# Patient Record
Sex: MALE | Race: WHITE | NOT HISPANIC OR LATINO | Employment: UNEMPLOYED | URBAN - METROPOLITAN AREA
[De-identification: names, ages, dates, MRNs, and addresses within clinical notes are randomized per-mention and may not be internally consistent; named-entity substitution may affect disease eponyms.]

---

## 2018-08-02 ENCOUNTER — OFFICE VISIT (OUTPATIENT)
Dept: FAMILY MEDICINE | Facility: CLINIC | Age: 43
End: 2018-08-02
Payer: COMMERCIAL

## 2018-08-02 VITALS
HEART RATE: 94 BPM | RESPIRATION RATE: 16 BRPM | HEIGHT: 69 IN | SYSTOLIC BLOOD PRESSURE: 110 MMHG | WEIGHT: 164 LBS | TEMPERATURE: 98.7 F | DIASTOLIC BLOOD PRESSURE: 70 MMHG | OXYGEN SATURATION: 98 % | BODY MASS INDEX: 24.29 KG/M2

## 2018-08-02 DIAGNOSIS — Z86.19 HISTORY OF LYME DISEASE: ICD-10-CM

## 2018-08-02 DIAGNOSIS — Z00.00 ROUTINE GENERAL MEDICAL EXAMINATION AT A HEALTH CARE FACILITY: Primary | ICD-10-CM

## 2018-08-02 DIAGNOSIS — F10.21 ALCOHOL DEPENDENCE IN REMISSION (H): ICD-10-CM

## 2018-08-02 PROCEDURE — 86617 LYME DISEASE ANTIBODY: CPT | Mod: 90 | Performed by: NURSE PRACTITIONER

## 2018-08-02 PROCEDURE — 99000 SPECIMEN HANDLING OFFICE-LAB: CPT | Performed by: NURSE PRACTITIONER

## 2018-08-02 PROCEDURE — 99386 PREV VISIT NEW AGE 40-64: CPT | Performed by: NURSE PRACTITIONER

## 2018-08-02 PROCEDURE — 36415 COLL VENOUS BLD VENIPUNCTURE: CPT | Performed by: NURSE PRACTITIONER

## 2018-08-02 PROCEDURE — 80053 COMPREHEN METABOLIC PANEL: CPT | Performed by: NURSE PRACTITIONER

## 2018-08-02 NOTE — PROGRESS NOTES
"    SUBJECTIVE:   CC: Paulo Franco is an 42 year old male who presents for preventative health visit.     Healthy Habits:    Do you get at least three servings of calcium containing foods daily (dairy, green leafy vegetables, etc.)? yes    Amount of exercise or daily activities, outside of work: 7 day(s) per week    Problems taking medications regularly No    Medication side effects: No    Have you had an eye exam in the past two years? yes    Do you see a dentist twice per year? no    Do you have sleep apnea, excessive snoring or daytime drowsiness?no     Patient would like blood work to follow up on recent lyme disease diagnosis-Positive Lymes about the 2nd of July and were put on Doxycycline for 3 weeks finished this was told to recheck just to be safe     Staying at now [place] [facility], mariusz (helped open [place] and other places around [facility]) will stay here and establishing today     Was at a [place], [place] - between [place] and [place]  and had flu like symptoms and broke out in hives, picked many ticks, but must have missed some  No symptoms now - had some lab work there was normal   Lots of sicknesses others have had, sore muscles from biking  spacial disotrtion issues, feels like has had a drink or two not sure if related  Rx glasses for \"Blind as a bat\"  Just had eye exam  Good diet and exercises regularly   Woodworking hobby makes walking sticks    Abuse: Current or Past(Physical, Sexual or Emotional)- No  Do you feel safe in your environment - Yes    Social History   Substance Use Topics     Smoking status: Never Smoker     Smokeless tobacco: Never Used     Alcohol use No      Comment: sober 4 months       If you drink alcohol do you typically have >3 drinks per day or >7 drinks per week? No                      Last PSA: No results found for: PSA    Reviewed orders with patient. Reviewed health maintenance and updated orders accordingly - Yes  Labs reviewed " in EPIC  BP Readings from Last 3 Encounters:   08/02/18 110/70    Wt Readings from Last 3 Encounters:   08/02/18 164 lb (74.4 kg)                  Patient Active Problem List   Diagnosis     History of Lyme disease     Alcohol dependence in remission (H)     Past Surgical History:   Procedure Laterality Date     HAND SURGERY Left        Social History   Substance Use Topics     Smoking status: Never Smoker     Smokeless tobacco: Never Used     Alcohol use No      Comment: sober 4 months      Family History   Problem Relation Age of Onset     Polycystic Kidney Diease Mother      Substance Abuse Mother      Depression Mother      Heart Defect Father      Heart Defect Brother          No current outpatient prescriptions on file.     No Known Allergies    Reviewed and updated as needed this visit by clinical staff  Tobacco  Allergies  Meds  Med Hx  Surg Hx  Fam Hx  Soc Hx        Reviewed and updated as needed this visit by Provider        Past Medical History:   Diagnosis Date     Substance abuse     Done with treatment on 7/16/2018      Past Surgical History:   Procedure Laterality Date     HAND SURGERY Left        ROS:  CONSTITUTIONAL: NEGATIVE for fever, chills, change in weight  INTEGUMENTARY/SKIN: NEGATIVE for worrisome rashes, moles or lesions  EYES: see HPI   ENT: NEGATIVE for ear, mouth and throat problems  RESP: NEGATIVE for significant cough or SOB  CV: NEGATIVE for chest pain, palpitations or peripheral edema  GI: NEGATIVE for nausea, abdominal pain, heartburn, or change in bowel habits   male: negative for dysuria, hematuria, decreased urinary stream, erectile dysfunction, urethral discharge  MUSCULOSKELETAL: NEGATIVE for significant arthralgias or myalgia  NEURO: NEGATIVE for weakness, dizziness or paresthesias  ENDOCRINE: NEGATIVE for temperature intolerance, skin/hair changes  HEME/ALLERGY/IMMUNE: NEGATIVE for bleeding problems  PSYCHIATRIC: NEGATIVE for changes in mood or affect    OBJECTIVE:  "  /70  Pulse 94  Temp 98.7  F (37.1  C) (Oral)  Resp 16  Ht 5' 8.5\" (1.74 m)  Wt 164 lb (74.4 kg)  SpO2 98%  BMI 24.57 kg/m2  EXAM:  GENERAL: healthy, alert and no distress  EYES: Eyes grossly normal to inspection, wearing glasses, PERRL and conjunctivae and sclerae normal  HENT: ear canals and TM's normal, nose and mouth without ulcers or lesions  NECK: no adenopathy, no asymmetry, masses, or scars and thyroid normal to palpation  RESP: lungs clear to auscultation - no rales, rhonchi or wheezes  BREAST: normal without masses, tenderness or nipple discharge and no palpable axillary masses or adenopathy  CV: regular rate and rhythm, normal S1 S2, no S3 or S4, no murmur, click or rub, no peripheral edema and peripheral pulses strong  ABDOMEN: soft, nontender, no hepatosplenomegaly, no masses and bowel sounds normal  MS: no gross musculoskeletal defects noted, no edema  SKIN: no suspicious lesions or rashes  NEURO: Normal strength and tone, mentation intact and speech normal  PSYCH: mentation appears normal, affect normal/bright    Diagnostic Test Results:  No results found for this or any previous visit (from the past 24 hour(s)).    ASSESSMENT/PLAN:       ICD-10-CM    1. Routine general medical examination at a health care facility Z00.00    2. History of Lyme disease Z86.19 Lyme Conf IgG and IgM by Immunoblot   3. Alcohol dependence in remission (H) F10.21 Comprehensive metabolic panel       COUNSELING:  Reviewed preventive health counseling, as reflected in patient instructions       Regular exercise       Healthy diet/nutrition       Vision screening       Safe sex practices/STD prevention       HIV screeninx in teen years, 1x in adult years, and at intervals if high risk    BP Readings from Last 1 Encounters:   18 110/70     Estimated body mass index is 24.57 kg/(m^2) as calculated from the following:    Height as of this encounter: 5' 8.5\" (1.74 m).    Weight as of this encounter: 164 lb " (74.4 kg).           reports that he has never smoked. He has never used smokeless tobacco.      Counseling Resources:  ATP IV Guidelines  Pooled Cohorts Equation Calculator  FRAX Risk Assessment  ICSI Preventive Guidelines  Dietary Guidelines for Americans, 2010  USDA's MyPlate  ASA Prophylaxis  Lung CA Screening    CELESTINO Ramirez CNP  [facility]

## 2018-08-02 NOTE — MR AVS SNAPSHOT
After Visit Summary   8/2/2018    Paulo Franco    MRN: [id number]           Patient Information     Date Of Birth          1975        Visit Information        Provider Department      8/2/2018 1:00 PM Larissa Mendoza APRN [facility]        Today's Diagnoses     Routine general medical examination at a health care facility    -  1    History of Lyme disease        Alcohol dependence in remission (H)          Care Instructions      Preventive Health Recommendations  Male Ages 40 to 49    Yearly exam:             See your health care provider every year in order to  o   Review health changes.   o   Discuss preventive care.    o   Review your medicines if your doctor has prescribed any.    You should be tested each year for STDs (sexually transmitted diseases) if you re at risk.     Have a cholesterol test every 5 years.     Have a colonoscopy (test for colon cancer) if someone in your family has had colon cancer or polyps before age 50.     After age 45, have a diabetes test (fasting glucose). If you are at risk for diabetes, you should have this test every 3 years.      Talk with your health care provider about whether or not a prostate cancer screening test (PSA) is right for you.    Shots: Get a flu shot each year. Get a tetanus shot every 10 years.     Nutrition:    Eat at least 5 servings of fruits and vegetables daily.     Eat whole-grain bread, whole-wheat pasta and brown rice instead of white grains and rice.     Get adequate Calcium and Vitamin D.     Lifestyle    Exercise for at least 150 minutes a week (30 minutes a day, 5 days a week). This will help you control your weight and prevent disease.     Limit alcohol to one drink per day.     No smoking.     Wear sunscreen to prevent skin cancer.     See your dentist every six months for an exam and cleaning.              Follow-ups after your visit        Who to contact     If you have questions or need follow up  "information about today's clinic visit or your schedule please contact [facility] directly at [phone].  Normal or non-critical lab and imaging results will be communicated to you by MyChart, letter or phone within 4 business days after the clinic has received the results. If you do not hear from us within 7 days, please contact the clinic through MyChart or phone. If you have a critical or abnormal lab result, we will notify you by phone as soon as possible.  Submit refill requests through [employer] or call your pharmacy and they will forward the refill request to us. Please allow 3 business days for your refill to be completed.          Additional Information About Your Visit        Care EveryWhere ID     This is your Care EveryWhere ID. This could be used by other organizations to access your [facility] medical records  [id number]        Your Vitals Were     Pulse Temperature Respirations Height Pulse Oximetry BMI (Body Mass Index)    94 98.7  F (37.1  C) (Oral) 16 5' 8.5\" (1.74 m) 98% 24.57 kg/m2       Blood Pressure from Last 3 Encounters:   08/02/18 110/70    Weight from Last 3 Encounters:   08/02/18 164 lb (74.4 kg)              Today, you had the following     No orders found for display       Primary Care Provider Fax #    Physician No Ref-Primary [phone]       No address on file        Equal Access to Services     ADONAY HINOJOSA : Hadii manuel siu hadasho Soomaali, waaxda luqadaha, qaybta kaalmada adeegyada, glen gann . So [facility] [phone].    ATENCIÓN: Si habla español, tiene a zhu disposición servicios gratuitos de asistencia lingüística. Llame al [phone].    We comply with applicable federal civil rights laws and [place] laws. We do not discriminate on the basis of race, color, national origin, age, disability, sex, sexual orientation, or gender identity.            Thank you!     Thank you for choosing [facility]  for your care. Our " goal is always to provide you with excellent care. Hearing back from our patients is one way we can continue to improve our services. Please take a few minutes to complete the written survey that you may receive in the mail after your visit with us. Thank you!             Your Updated Medication List - Protect others around you: Learn how to safely use, store and throw away your medicines at www.disposemymeds.org.      Notice  As of 8/2/2018  1:45 PM    You have not been prescribed any medications.

## 2018-08-03 LAB
ALBUMIN SERPL-MCNC: 3.9 G/DL (ref 3.4–5)
ALP SERPL-CCNC: 78 U/L (ref 40–150)
ALT SERPL W P-5'-P-CCNC: 24 U/L (ref 0–70)
ANION GAP SERPL CALCULATED.3IONS-SCNC: 10 MMOL/L (ref 3–14)
AST SERPL W P-5'-P-CCNC: 20 U/L (ref 0–45)
BILIRUB SERPL-MCNC: 0.4 MG/DL (ref 0.2–1.3)
BUN SERPL-MCNC: 14 MG/DL (ref 7–30)
CALCIUM SERPL-MCNC: 8.9 MG/DL (ref 8.5–10.1)
CHLORIDE SERPL-SCNC: 110 MMOL/L (ref 94–109)
CO2 SERPL-SCNC: 25 MMOL/L (ref 20–32)
CREAT SERPL-MCNC: 0.8 MG/DL (ref 0.66–1.25)
GFR SERPL CREATININE-BSD FRML MDRD: >90 ML/MIN/1.7M2
GLUCOSE SERPL-MCNC: 108 MG/DL (ref 70–99)
POTASSIUM SERPL-SCNC: 4.1 MMOL/L (ref 3.4–5.3)
PROT SERPL-MCNC: 7.8 G/DL (ref 6.8–8.8)
SODIUM SERPL-SCNC: 145 MMOL/L (ref 133–144)

## 2018-08-04 LAB
B BURGDOR IGG SER QL IB: NEGATIVE
B BURGDOR IGM SER QL IB: POSITIVE

## 2021-05-29 ENCOUNTER — TELEPHONE (OUTPATIENT)
Dept: BEHAVIORAL HEALTH | Facility: CLINIC | Age: 46
End: 2021-05-29

## 2021-05-29 ENCOUNTER — HOSPITAL ENCOUNTER (INPATIENT)
Facility: CLINIC | Age: 46
LOS: 4 days | Discharge: HOME | End: 2021-06-02
Attending: PSYCHIATRY & NEUROLOGY | Admitting: PSYCHIATRY & NEUROLOGY
Payer: COMMERCIAL

## 2021-05-29 ENCOUNTER — HOSPITAL ENCOUNTER (EMERGENCY)
Facility: CLINIC | Age: 46
Discharge: HOME | End: 2021-05-29
Attending: NURSE PRACTITIONER | Admitting: NURSE PRACTITIONER
Payer: COMMERCIAL

## 2021-05-29 VITALS
OXYGEN SATURATION: 98 % | TEMPERATURE: 98.9 F | HEART RATE: 110 BPM | DIASTOLIC BLOOD PRESSURE: 73 MMHG | BODY MASS INDEX: 20.99 KG/M2 | WEIGHT: 155 LBS | HEIGHT: 72 IN | RESPIRATION RATE: 20 BRPM | SYSTOLIC BLOOD PRESSURE: 114 MMHG

## 2021-05-29 DIAGNOSIS — R44.0 AUDITORY HALLUCINATION: ICD-10-CM

## 2021-05-29 DIAGNOSIS — F10.930 ALCOHOL WITHDRAWAL SYNDROME WITHOUT COMPLICATION (H): Primary | ICD-10-CM

## 2021-05-29 DIAGNOSIS — R74.01 TRANSAMINITIS: ICD-10-CM

## 2021-05-29 DIAGNOSIS — F10.229 ACUTE ALCOHOLIC INTOXICATION IN ALCOHOLISM (H): ICD-10-CM

## 2021-05-29 PROBLEM — F10.939 ALCOHOL WITHDRAWAL (H): Status: ACTIVE | Noted: 2021-05-29

## 2021-05-29 LAB
ALBUMIN SERPL-MCNC: 4 G/DL (ref 3.4–5)
ALCOHOL BREATH TEST: 0.18 (ref 0–0.01)
ALP SERPL-CCNC: 105 U/L (ref 40–150)
ALT SERPL W P-5'-P-CCNC: 118 U/L (ref 0–70)
AMPHETAMINES UR QL SCN: NEGATIVE
ANION GAP SERPL CALCULATED.3IONS-SCNC: 10 MMOL/L (ref 3–14)
AST SERPL W P-5'-P-CCNC: 75 U/L (ref 0–45)
BARBITURATES UR QL: NEGATIVE
BASOPHILS # BLD AUTO: 0 10E9/L (ref 0–0.2)
BASOPHILS NFR BLD AUTO: 0.7 %
BENZODIAZ UR QL: POSITIVE
BILIRUB SERPL-MCNC: 0.4 MG/DL (ref 0.2–1.3)
BUN SERPL-MCNC: 6 MG/DL (ref 7–30)
CALCIUM SERPL-MCNC: 8.6 MG/DL (ref 8.5–10.1)
CANNABINOIDS UR QL SCN: NEGATIVE
CHLORIDE SERPL-SCNC: 107 MMOL/L (ref 94–109)
CO2 SERPL-SCNC: 25 MMOL/L (ref 20–32)
COCAINE UR QL: NEGATIVE
CREAT SERPL-MCNC: 0.7 MG/DL (ref 0.66–1.25)
DIFFERENTIAL METHOD BLD: NORMAL
EOSINOPHIL # BLD AUTO: 0.1 10E9/L (ref 0–0.7)
EOSINOPHIL NFR BLD AUTO: 1.4 %
ERYTHROCYTE [DISTWIDTH] IN BLOOD BY AUTOMATED COUNT: 14.2 % (ref 10–15)
ETHANOL SERPL-MCNC: 0.36 G/DL
GFR SERPL CREATININE-BSD FRML MDRD: >90 ML/MIN/{1.73_M2}
GLUCOSE SERPL-MCNC: 115 MG/DL (ref 70–99)
HCT VFR BLD AUTO: 44.7 % (ref 40–53)
HGB BLD-MCNC: 15.3 G/DL (ref 13.3–17.7)
IMM GRANULOCYTES # BLD: 0 10E9/L (ref 0–0.4)
IMM GRANULOCYTES NFR BLD: 0.2 %
LABORATORY COMMENT REPORT: NORMAL
LIPASE SERPL-CCNC: 224 U/L (ref 73–393)
LYMPHOCYTES # BLD AUTO: 2 10E9/L (ref 0.8–5.3)
LYMPHOCYTES NFR BLD AUTO: 35.3 %
MCH RBC QN AUTO: 31.9 PG (ref 26.5–33)
MCHC RBC AUTO-ENTMCNC: 34.2 G/DL (ref 31.5–36.5)
MCV RBC AUTO: 93 FL (ref 78–100)
MONOCYTES # BLD AUTO: 0.4 10E9/L (ref 0–1.3)
MONOCYTES NFR BLD AUTO: 7.4 %
NEUTROPHILS # BLD AUTO: 3 10E9/L (ref 1.6–8.3)
NEUTROPHILS NFR BLD AUTO: 55 %
NRBC # BLD AUTO: 0 10*3/UL
NRBC BLD AUTO-RTO: 0 /100
OPIATES UR QL SCN: NEGATIVE
PCP UR QL SCN: NEGATIVE
PLATELET # BLD AUTO: 354 10E9/L (ref 150–450)
POTASSIUM SERPL-SCNC: 3.7 MMOL/L (ref 3.4–5.3)
PROT SERPL-MCNC: 8.6 G/DL (ref 6.8–8.8)
RBC # BLD AUTO: 4.8 10E12/L (ref 4.4–5.9)
SARS-COV-2 RNA RESP QL NAA+PROBE: NEGATIVE
SODIUM SERPL-SCNC: 142 MMOL/L (ref 133–144)
SPECIMEN SOURCE: NORMAL
WBC # BLD AUTO: 5.5 10E9/L (ref 4–11)

## 2021-05-29 PROCEDURE — 250N000013 HC RX MED GY IP 250 OP 250 PS 637: Performed by: CLINICAL NURSE SPECIALIST

## 2021-05-29 PROCEDURE — 82075 ASSAY OF BREATH ETHANOL: CPT

## 2021-05-29 PROCEDURE — 83690 ASSAY OF LIPASE: CPT | Performed by: EMERGENCY MEDICINE

## 2021-05-29 PROCEDURE — 96365 THER/PROPH/DIAG IV INF INIT: CPT

## 2021-05-29 PROCEDURE — 128N000004 HC R&B CD ADULT

## 2021-05-29 PROCEDURE — 96375 TX/PRO/DX INJ NEW DRUG ADDON: CPT

## 2021-05-29 PROCEDURE — HZ2ZZZZ DETOXIFICATION SERVICES FOR SUBSTANCE ABUSE TREATMENT: ICD-10-PCS | Performed by: PSYCHIATRY & NEUROLOGY

## 2021-05-29 PROCEDURE — 250N000011 HC RX IP 250 OP 636: Performed by: NURSE PRACTITIONER

## 2021-05-29 PROCEDURE — 80307 DRUG TEST PRSMV CHEM ANLYZR: CPT | Performed by: NURSE PRACTITIONER

## 2021-05-29 PROCEDURE — 80053 COMPREHEN METABOLIC PANEL: CPT | Performed by: EMERGENCY MEDICINE

## 2021-05-29 PROCEDURE — 258N000003 HC RX IP 258 OP 636: Performed by: NURSE PRACTITIONER

## 2021-05-29 PROCEDURE — 87635 SARS-COV-2 COVID-19 AMP PRB: CPT | Performed by: EMERGENCY MEDICINE

## 2021-05-29 PROCEDURE — 96376 TX/PRO/DX INJ SAME DRUG ADON: CPT

## 2021-05-29 PROCEDURE — C9113 INJ PANTOPRAZOLE SODIUM, VIA: HCPCS | Performed by: NURSE PRACTITIONER

## 2021-05-29 PROCEDURE — 99285 EMERGENCY DEPT VISIT HI MDM: CPT | Mod: 25

## 2021-05-29 PROCEDURE — 85025 COMPLETE CBC W/AUTO DIFF WBC: CPT | Performed by: EMERGENCY MEDICINE

## 2021-05-29 PROCEDURE — 250N000009 HC RX 250: Performed by: NURSE PRACTITIONER

## 2021-05-29 PROCEDURE — C9803 HOPD COVID-19 SPEC COLLECT: HCPCS

## 2021-05-29 PROCEDURE — 82077 ASSAY SPEC XCP UR&BREATH IA: CPT | Performed by: EMERGENCY MEDICINE

## 2021-05-29 RX ORDER — SUCRALFATE 1 G/1
1 TABLET ORAL
Status: ON HOLD | COMMUNITY
End: 2021-06-02

## 2021-05-29 RX ORDER — ATENOLOL 50 MG/1
50 TABLET ORAL DAILY PRN
Status: DISCONTINUED | OUTPATIENT
Start: 2021-05-29 | End: 2021-06-02 | Stop reason: HOSPADM

## 2021-05-29 RX ORDER — MULTIPLE VITAMINS W/ MINERALS TAB 9MG-400MCG
1 TAB ORAL DAILY
Status: ON HOLD | COMMUNITY
End: 2021-06-02

## 2021-05-29 RX ORDER — FOLIC ACID 1 MG/1
1 TABLET ORAL DAILY
Status: DISCONTINUED | OUTPATIENT
Start: 2021-05-30 | End: 2021-06-02 | Stop reason: HOSPADM

## 2021-05-29 RX ORDER — HYDROXYZINE HYDROCHLORIDE 25 MG/1
25 TABLET, FILM COATED ORAL EVERY 4 HOURS PRN
Status: DISCONTINUED | OUTPATIENT
Start: 2021-05-29 | End: 2021-06-02 | Stop reason: HOSPADM

## 2021-05-29 RX ORDER — PANTOPRAZOLE SODIUM 40 MG/1
40 TABLET, DELAYED RELEASE ORAL 2 TIMES DAILY
Status: DISCONTINUED | OUTPATIENT
Start: 2021-05-29 | End: 2021-06-02 | Stop reason: HOSPADM

## 2021-05-29 RX ORDER — PANTOPRAZOLE SODIUM 40 MG/1
40 TABLET, DELAYED RELEASE ORAL 2 TIMES DAILY
Status: ON HOLD | COMMUNITY
End: 2021-06-02

## 2021-05-29 RX ORDER — DIAZEPAM 5 MG
5-20 TABLET ORAL EVERY 30 MIN PRN
Status: DISCONTINUED | OUTPATIENT
Start: 2021-05-29 | End: 2021-06-02 | Stop reason: HOSPADM

## 2021-05-29 RX ORDER — LORAZEPAM 2 MG/ML
1 INJECTION INTRAMUSCULAR ONCE
Status: COMPLETED | OUTPATIENT
Start: 2021-05-29 | End: 2021-05-29

## 2021-05-29 RX ORDER — ONDANSETRON 4 MG/1
4 TABLET, ORALLY DISINTEGRATING ORAL EVERY 6 HOURS PRN
Status: DISCONTINUED | OUTPATIENT
Start: 2021-05-29 | End: 2021-06-02 | Stop reason: HOSPADM

## 2021-05-29 RX ORDER — LOPERAMIDE HCL 2 MG
2 CAPSULE ORAL 4 TIMES DAILY PRN
Status: DISCONTINUED | OUTPATIENT
Start: 2021-05-29 | End: 2021-06-02 | Stop reason: HOSPADM

## 2021-05-29 RX ORDER — MULTIPLE VITAMINS W/ MINERALS TAB 9MG-400MCG
1 TAB ORAL DAILY
Status: DISCONTINUED | OUTPATIENT
Start: 2021-05-30 | End: 2021-06-02 | Stop reason: HOSPADM

## 2021-05-29 RX ORDER — TRAZODONE HYDROCHLORIDE 50 MG/1
50 TABLET, FILM COATED ORAL
Status: DISCONTINUED | OUTPATIENT
Start: 2021-05-29 | End: 2021-06-02 | Stop reason: HOSPADM

## 2021-05-29 RX ORDER — SUCRALFATE 1 G/1
1 TABLET ORAL
Status: DISCONTINUED | OUTPATIENT
Start: 2021-05-29 | End: 2021-06-02 | Stop reason: HOSPADM

## 2021-05-29 RX ORDER — LANOLIN ALCOHOL/MO/W.PET/CERES
100 CREAM (GRAM) TOPICAL DAILY
Status: DISCONTINUED | OUTPATIENT
Start: 2021-05-30 | End: 2021-06-02 | Stop reason: HOSPADM

## 2021-05-29 RX ORDER — MAGNESIUM HYDROXIDE/ALUMINUM HYDROXICE/SIMETHICONE 120; 1200; 1200 MG/30ML; MG/30ML; MG/30ML
30 SUSPENSION ORAL EVERY 4 HOURS PRN
Status: DISCONTINUED | OUTPATIENT
Start: 2021-05-29 | End: 2021-06-02 | Stop reason: HOSPADM

## 2021-05-29 RX ORDER — ACETAMINOPHEN 325 MG/1
650 TABLET ORAL EVERY 4 HOURS PRN
Status: DISCONTINUED | OUTPATIENT
Start: 2021-05-29 | End: 2021-06-02 | Stop reason: HOSPADM

## 2021-05-29 RX ADMIN — SUCRALFATE 1 G: 1 TABLET ORAL at 21:18

## 2021-05-29 RX ADMIN — PANTOPRAZOLE SODIUM 40 MG: 40 TABLET, DELAYED RELEASE ORAL at 21:06

## 2021-05-29 RX ADMIN — PANTOPRAZOLE SODIUM 40 MG: 40 INJECTION, POWDER, FOR SOLUTION INTRAVENOUS at 14:25

## 2021-05-29 RX ADMIN — FOLIC ACID: 5 INJECTION, SOLUTION INTRAMUSCULAR; INTRAVENOUS; SUBCUTANEOUS at 14:26

## 2021-05-29 RX ADMIN — LORAZEPAM 1 MG: 2 INJECTION INTRAMUSCULAR; INTRAVENOUS at 18:42

## 2021-05-29 RX ADMIN — DIAZEPAM 10 MG: 5 TABLET ORAL at 21:06

## 2021-05-29 RX ADMIN — LORAZEPAM 1 MG: 2 INJECTION INTRAMUSCULAR; INTRAVENOUS at 15:34

## 2021-05-29 RX ADMIN — LORAZEPAM 1 MG: 2 INJECTION INTRAMUSCULAR; INTRAVENOUS at 16:23

## 2021-05-29 ASSESSMENT — ENCOUNTER SYMPTOMS
FEVER: 0
ABDOMINAL PAIN: 1
COUGH: 0
VOMITING: 1
DIARRHEA: 0

## 2021-05-29 ASSESSMENT — ACTIVITIES OF DAILY LIVING (ADL)
FALL_HISTORY_WITHIN_LAST_SIX_MONTHS: NO
ORAL_HYGIENE: INDEPENDENT
HEARING_DIFFICULTY_OR_DEAF: NO
DIFFICULTY_COMMUNICATING: NO
CONCENTRATING,_REMEMBERING_OR_MAKING_DECISIONS_DIFFICULTY: NO
WEAR_GLASSES_OR_BLIND: NO
DOING_ERRANDS_INDEPENDENTLY_DIFFICULTY: NO
HYGIENE/GROOMING: INDEPENDENT
DIFFICULTY_EATING/SWALLOWING: NO
DRESS: INDEPENDENT
PATIENT_/_FAMILY_COMMUNICATION_STYLE: SPOKEN LANGUAGE (ENGLISH OR BILINGUAL)
TOILETING_ISSUES: NO
DRESSING/BATHING_DIFFICULTY: NO
WALKING_OR_CLIMBING_STAIRS_DIFFICULTY: NO
LAUNDRY: WITH SUPERVISION

## 2021-05-29 ASSESSMENT — MIFFLIN-ST. JEOR: SCORE: 1626.08

## 2021-05-29 NOTE — TELEPHONE ENCOUNTER
S: 4PM- ED MD called to request detox bed for 45 yrs old male in the [facility] ED.     B: Pt presents to the ED seeking detox from alcohol.  Pt reports drinking 1 pint to 2 pints of vodka per day for the last week.  Pt has a long hx of alcoholism and homelessness.  Pt was in a sober house for 10 months and has been out for 2 weeks.  Pt reported a history of 1 w/d seizures although there were no medical noes.  Pt has a remote hx of DTs but not in the last visits. Pt has esophagitis and lime disease.  Pt is walking and talking independently.  Labs are done.  COVID is negative.     COVID: negative  Lipase: normal  Blood alcohol: 0.36 @ 1:15PM  CMP: Glucose 115, , AST 75  CBC: WNL  Vitals: stable    A: Vol.  Seeking help.      R:     4:45PM-  requested for UTOX:  ED RN will get it done.     5:03PM- [facility] accepts for 3A/[facility].         Patient cleared and ready for behavioral bed placement: Yes

## 2021-05-29 NOTE — ED NOTES
[facility] has a male bed awaiting provider clinical and labs.  Provider updated. [phone]

## 2021-05-29 NOTE — ED NOTES
DATE:  5/29/2021   TIME OF RECEIPT FROM LAB:  2435  LAB TEST:  Ethanol  LAB VALUE:  0.36  RESULTS GIVEN WITH READ-BACK TO (PROVIDER):  Vi Zamora CNP  TIME LAB VALUE REPORTED TO PROVIDER:   9812

## 2021-05-29 NOTE — DISCHARGE INSTRUCTIONS

## 2021-05-29 NOTE — ED NOTES
Pt accepted to [facility] 3A per Dr Gonzales. [facility] nurse to call [place] nurse for report at 1830 [phone]. Accepting provider would prefer urine drug screen & repeat breathalyzer to be completed prior to transfer.

## 2021-05-29 NOTE — ED TRIAGE NOTES
Patient was picked up in a hotel lobby, he was not a resident.  patient requested EMS due to vomiting blood.  VS+ tachy, 130, 12 was ST, , 4MG of Zofran, 18 in the right FA.  300ml of NS. Alert but sluggish, with confused conversation that he goes into.   Was living in a sober house but now he is hotel hopping. Patient was sober for 10 months, and is hoping for help today.  Patient POC breathalizer for EMS was 0.3

## 2021-05-29 NOTE — ED PROVIDER NOTES
"    History     Chief Complaint:  Alcohol Intoxication     HPI:   Paulo Franco is a 45 year old male with a history of alcohol abuse who presents via EMS with alcohol intoxication. Patient was living in a sober house for 10 months and had a relapse so had been at an emergency \"second chance \"house but left 6 days ago.  He has been staying in hotels for 6 days now. He was sober for those 10 months but recently relapsed. Patient states he had an onset of blood streaks along his emesis yesterday with no episodes today. These are recurrent symptoms that occur when he relapses. Patient drinks 1 to 1.5 liter of vodka per day and is interested in inpatient treatment.  His last drink was 2 hours prior to arrival.  He states outpatient treatments have been unsuccessful for him. His last endoscopy was in 2/2020. Denies any fever, cough, diarrhea.    Review of Systems   Constitutional: Negative for fever.   Respiratory: Negative for cough.    Gastrointestinal: Positive for abdominal pain and vomiting (bloody). Negative for diarrhea.   All other systems reviewed and are negative.     Allergies:  No Known Allergies     Medications:    Prilosec    Past Medical History:    Substance abuse   ADHD    Past Surgical History:    Hand surgery     Family History:    Polycystic kidney disease  Substance abuse  Depression  Heart defect    Social History:  Alcohol use: yes  Patient presents via EMS alone.    Physical Exam     Vitals:  Patient Vitals for the past 24 hrs:   BP Temp Temp src Pulse Resp SpO2 Height Weight   05/29/21 1740 -- -- -- 104 -- -- -- --   05/29/21 1625 -- -- -- 114 -- 99 % -- --   05/29/21 1608 -- -- -- 107 -- 98 % -- --   05/29/21 1539 126/84 -- -- 111 20 98 % -- --   05/29/21 1258 (!) 122/93 98.9  F (37.2  C) Temporal 116 -- 96 % 1.829 m (6') 70.3 kg (155 lb)       Physical Exam:  Nursing notes reviewed. Vitals reviewed.  General: Alert. disheveled  Eyes:  Conjunctiva non-injected, non-icteric. No " nystagmus  Neck/Throat: Moist mucous membranes. Normal voice.  Cardiac: Regular rhythm. Normal heart sounds.  Pulmonary: Clear and equal breath sounds bilaterally.   Musculoskeletal: Normal gross range of motion of all 4 extremities.   Neurological: Alert and oriented x4. No tongue fasciculations  Skin: Warm and dry. Normal appearance of visualized exposed skin without rashes or petechiae.  Psych: Affect normal. Good eye contact.  --Speech rambling, speech volume regular, speech quality fluid.   --Cognitive, perceptual reality based.   --Orientation to time, place, person and situation.   --Hallucinations: None.   --General behavioral tone: Cooperative.  --Psychomotor activity: No problem noted. Gait: No problem.   --Mood normal. Affect congruent and appropriate.    Emergency Department Course     Laboratory:  CBC: WBC 5.5, HGB 15.3,      CMP: Glucose 115 (H), BUN 6 (L),  (H), AST 75 (H) o/w WNL (Creatinine 0.70)     1315 Alcohol ethyl: 0.36 (HH)  1822 Breathalizer: 0.177    Lipase: 224    Asymptomatic COVID-19 virus by PCR: negative    Drug abuse screen urine: benzodiazepine positive o/w negative    Emergency Department Course:  Reviewed:  Past medical records, Care Everywhere, nursing notes, and vitals reviewed.     Assessments:  1339 I performed an exam of the patient and obtained history, as documented above.  CIWA score 5.  1620 Patient rechecked and updated.  CIWA score 8    Consults:  1609 I spoke with intake at [facility] regarding patient's presentation, findings, and plan of care. ([phone])  1830 Nursing staff spoke with [facility] and he is accepted for transfer.  1830  I rechecked patient and he is alert and oriented, watching TV.    Interventions:  1426 0.9% NS with Infuvite and thiamine, 1000 mL, IV  1425 Protonix, 40 mg, IV  1534 Ativan, 1 mg, IV  1623 Ativan, 1 mg, IV    Disposition:  Care of the patient was transferred to my colleague Dr. Mccracken pending transfer to  [facility].     Impression & Plan     Medical Decision Making:  Paulo Franco is a 45 year old male who presents to the emergency department today for evaluation of alcohol intoxication. Patient does note a long history of alcoholism. He had been in a sober house for 10 months, had a relapse, and then moved to [facility] which he left 1 week ago on Monday. He states he has been drinking since that time period. He drinks approximately 1-1.5 pints of vodka per day. On chart review, patient does not have a history of alcohol withdrawal seizures, but does have history of DTs. His blood alcohol today is 0.36. He has a mild elevation in the ALT and AST but otherwise normal bilirubin and AlkPhos. Patient has a CIWA score of 8 but notes onset of auditory and visual hallucinations which is consistent with previous withdrawal. No history of violence. He is alert, able to eat a whole meal, and ambulated to the bathroom without assistance. He was treated with Ativan for his hallucinations and had improvement. He was also given a banana bag IV fluids. I spoke with [facility] and the patient will transfer to [facility]. Patient is voluntary and requests admission to detox.  He will transfer by EMS under transport hold.     Covid-19  Paulo Franco was evaluated during a global COVID-19 pandemic, which necessitated consideration that the patient might be at risk for infection with the SARS-CoV-2 virus that causes COVID-19.   Applicable protocols for evaluation were followed during the patient's care.   COVID-19 was considered as part of the patient's evaluation. The plan for testing is:  a test was obtained during this visit.    Diagnosis:    ICD-10-CM    1. Acute alcoholic intoxication in alcoholism (H)  F10.229    2. Transaminitis  R74.01    3. Auditory hallucination  R44.0         Discharge Medications:  New Prescriptions    No medications on file       Scribe Disclosure:  I, Kim Hernandez, am serving as a  scribe at 1:39 PM on 5/29/2021 to document services personally performed by Vi Zamora, ANTONIO based on my observations and the provider's statements to me.       Kim Hernandez  5/29/2021     Vi Zamora, CNP  05/29/21 1834

## 2021-05-30 LAB
CHOLEST SERPL-MCNC: 309 MG/DL
GGT SERPL-CCNC: 72 U/L (ref 0–75)
HDLC SERPL-MCNC: 72 MG/DL
LDLC SERPL CALC-MCNC: 225 MG/DL
MAGNESIUM SERPL-MCNC: 1.7 MG/DL (ref 1.6–2.3)
NONHDLC SERPL-MCNC: 237 MG/DL
TRIGL SERPL-MCNC: 59 MG/DL
TSH SERPL DL<=0.005 MIU/L-ACNC: 1.04 MU/L (ref 0.4–4)

## 2021-05-30 PROCEDURE — 36415 COLL VENOUS BLD VENIPUNCTURE: CPT | Performed by: CLINICAL NURSE SPECIALIST

## 2021-05-30 PROCEDURE — 250N000011 HC RX IP 250 OP 636: Performed by: CLINICAL NURSE SPECIALIST

## 2021-05-30 PROCEDURE — 250N000013 HC RX MED GY IP 250 OP 250 PS 637: Performed by: NURSE PRACTITIONER

## 2021-05-30 PROCEDURE — 99232 SBSQ HOSP IP/OBS MODERATE 35: CPT | Performed by: NURSE PRACTITIONER

## 2021-05-30 PROCEDURE — 83735 ASSAY OF MAGNESIUM: CPT | Performed by: CLINICAL NURSE SPECIALIST

## 2021-05-30 PROCEDURE — 84443 ASSAY THYROID STIM HORMONE: CPT | Performed by: CLINICAL NURSE SPECIALIST

## 2021-05-30 PROCEDURE — 80061 LIPID PANEL: CPT | Performed by: CLINICAL NURSE SPECIALIST

## 2021-05-30 PROCEDURE — 99207 PR CONSULT E&M CHANGED TO SUBSEQUENT LEVEL: CPT | Performed by: NURSE PRACTITIONER

## 2021-05-30 PROCEDURE — 258N000003 HC RX IP 258 OP 636: Performed by: NURSE PRACTITIONER

## 2021-05-30 PROCEDURE — 83735 ASSAY OF MAGNESIUM: CPT | Performed by: NURSE PRACTITIONER

## 2021-05-30 PROCEDURE — 250N000013 HC RX MED GY IP 250 OP 250 PS 637: Performed by: CLINICAL NURSE SPECIALIST

## 2021-05-30 PROCEDURE — 128N000004 HC R&B CD ADULT

## 2021-05-30 PROCEDURE — 250N000013 HC RX MED GY IP 250 OP 250 PS 637: Performed by: PSYCHIATRY & NEUROLOGY

## 2021-05-30 PROCEDURE — 82977 ASSAY OF GGT: CPT | Performed by: CLINICAL NURSE SPECIALIST

## 2021-05-30 RX ORDER — PROPRANOLOL HYDROCHLORIDE 10 MG/1
10-20 TABLET ORAL 3 TIMES DAILY PRN
Status: DISCONTINUED | OUTPATIENT
Start: 2021-05-30 | End: 2021-06-02 | Stop reason: HOSPADM

## 2021-05-30 RX ORDER — MAGNESIUM OXIDE 400 MG/1
400 TABLET ORAL DAILY
Status: COMPLETED | OUTPATIENT
Start: 2021-05-30 | End: 2021-05-31

## 2021-05-30 RX ADMIN — PANTOPRAZOLE SODIUM 40 MG: 40 TABLET, DELAYED RELEASE ORAL at 07:50

## 2021-05-30 RX ADMIN — DIAZEPAM 10 MG: 5 TABLET ORAL at 23:04

## 2021-05-30 RX ADMIN — SUCRALFATE 1 G: 1 TABLET ORAL at 12:29

## 2021-05-30 RX ADMIN — SODIUM CHLORIDE 1000 ML: 9 INJECTION, SOLUTION INTRAVENOUS at 16:47

## 2021-05-30 RX ADMIN — PANTOPRAZOLE SODIUM 40 MG: 40 TABLET, DELAYED RELEASE ORAL at 21:02

## 2021-05-30 RX ADMIN — PROPRANOLOL HYDROCHLORIDE 10 MG: 10 TABLET ORAL at 23:04

## 2021-05-30 RX ADMIN — FOLIC ACID 1 MG: 1 TABLET ORAL at 07:50

## 2021-05-30 RX ADMIN — MULTIPLE VITAMINS W/ MINERALS TAB 1 TABLET: TAB at 07:50

## 2021-05-30 RX ADMIN — HYDROXYZINE HYDROCHLORIDE 25 MG: 25 TABLET, FILM COATED ORAL at 16:51

## 2021-05-30 RX ADMIN — DIAZEPAM 10 MG: 5 TABLET ORAL at 07:50

## 2021-05-30 RX ADMIN — ONDANSETRON 4 MG: 4 TABLET, ORALLY DISINTEGRATING ORAL at 15:58

## 2021-05-30 RX ADMIN — DIAZEPAM 10 MG: 5 TABLET ORAL at 15:58

## 2021-05-30 RX ADMIN — Medication 400 MG: at 15:58

## 2021-05-30 RX ADMIN — SUCRALFATE 1 G: 1 TABLET ORAL at 07:50

## 2021-05-30 RX ADMIN — SUCRALFATE 1 G: 1 TABLET ORAL at 15:58

## 2021-05-30 RX ADMIN — SUCRALFATE 1 G: 1 TABLET ORAL at 21:01

## 2021-05-30 RX ADMIN — DIAZEPAM 10 MG: 5 TABLET ORAL at 04:21

## 2021-05-30 RX ADMIN — DIAZEPAM 10 MG: 5 TABLET ORAL at 12:29

## 2021-05-30 RX ADMIN — DIAZEPAM 10 MG: 5 TABLET ORAL at 01:14

## 2021-05-30 RX ADMIN — DIAZEPAM 10 MG: 5 TABLET ORAL at 21:02

## 2021-05-30 RX ADMIN — DIAZEPAM 10 MG: 5 TABLET ORAL at 10:18

## 2021-05-30 RX ADMIN — THIAMINE HCL TAB 100 MG 100 MG: 100 TAB at 07:50

## 2021-05-30 RX ADMIN — PROPRANOLOL HYDROCHLORIDE 10 MG: 10 TABLET ORAL at 18:09

## 2021-05-30 ASSESSMENT — ACTIVITIES OF DAILY LIVING (ADL)
ORAL_HYGIENE: INDEPENDENT
HYGIENE/GROOMING: INDEPENDENT
DRESS: INDEPENDENT
LAUNDRY: WITH SUPERVISION

## 2021-05-30 NOTE — PROGRESS NOTES
Pt.appeared to be in acute alcohol withdrawal upon waking up in the morning as evidenced by general tremors, diaphoresis, and agitation. He complained of visual hallucination. His heart rate and blood pressure were elevated. Pt.complained he could not eat breakfast.     Scored 12, 11, & 10 on MSSA. Writer administered 10mg prn valium X3. His symptoms seemed to improve after medication administration. He ate and enjoyed his lunch. He was out in the lounge watching TV with peers. Affect was restricted/blunted and he was withdrawn to self. Antonio did not require any antipsychotic medication or seclusion/restraint to manage behavior. Denied suicidal ideation and self injurious behavior.  Will continue to monitor.

## 2021-05-30 NOTE — PROGRESS NOTES
Pt's MSSA = 11 & 12. He got 10mg valium X2. Pt.slept through the night. Will continue to monitor.

## 2021-05-30 NOTE — PROGRESS NOTES
05/29/21 1954   Patient Belongings   Did you bring any home meds/supplements to the hospital?  No   Patient Belongings other (see comments)  (storage bin, medroom bin, security)   Belongings Search Yes   Second Staff [employer]   Storage bin: jacket, keys, sunglasses, smart watch  Medroom bin: cell phone, wallet  [id number]: $104 cash, 2 mastercard, visa  A               Admission:  I am responsible for any personal items that are not sent to the safe or pharmacy.  [facility] is not responsible for loss, theft or damage of any property in my possession.    Signature:  _________________________________ Date: _______  Time: _____                                              Staff Signature:  ____________________________ Date: ________  Time: _____      2nd Staff person, if patient is unable/unwilling to sign:    Signature: ________________________________ Date: ________  Time: _____     Discharge:  [facility] has returned all of my personal belongings:    Signature: _________________________________ Date: ________  Time: _____                                          Staff Signature:  ____________________________ Date: ________  Time: _____

## 2021-05-30 NOTE — PLAN OF CARE
Patient reports he would like to attend inpatient MI/CD treatment, is not picky about where but would like to get in sooner than later. Encouraged to start CD Assessment paperwork.

## 2021-05-30 NOTE — PHARMACY-ADMISSION MEDICATION HISTORY
Admission Medication History Completed by Pharmacy    See [facility] Admission Navigator for allergy information, preferred outpatient pharmacy, prior to admission medications and immunization status.     Medication history sources:  patient interview via phone, SureScripts dispense history    Changes made to PTA medication list (reason)  Added:   - all meds  Deleted: N/A  Changed: N/A    Additional medication history information:   - Patient was also discharged from [facility] with 4 day supply for gabapentin on 5/19/21. Patient states this was short term for withdrawal symptoms only (not added to list).  - Patient denies taking any additional Rx/OTC medications other than the ones listed below.    Prior to Admission medications    Medication Sig Last Dose Taking? Auth Provider   multivitamin w/minerals (THERA-VIT-M) tablet Take 1 tablet by mouth daily Past Week Yes Unknown, Entered By History   pantoprazole (PROTONIX) 40 MG EC tablet Take 40 mg by mouth 2 times daily Past Week Yes Unknown, Entered By History   sucralfate (CARAFATE) 1 GM tablet Take 1 g by mouth 4 times daily (before meals and nightly) Past Week Yes Unknown, Entered By History     Date completed: 05/29/21    Medication history completed by:   Jeffrey Deleon, PharmD, BCPS  [facility] Building: [place] *[id number]

## 2021-05-30 NOTE — CONSULTS
Internal Medicine Consult - Initial Visit       Paulo Franco MRN# [id number]   YOB: 1975 Age: 45 year old   Date of Admission: 5/29/2021  PCP: No Ref-Primary, Physician  Date of Service: 5/30/2021    Referring Provider: Yasir Gonzales MD  Reason for Consult: Medical co-management          Assessment and Recommendations:   Paulo Franco is a 45 year old male with a history of alcohol use disorder, esophagitis, multiple concussions, Lyme disease s/p treatment with Doxycycline, depression, and anxiety admitted to station 3A for alcohol withdrawal and detox.      # Alcohol withdrawal, hx of alcohol use disorder - MSSA 10 this shift.  Reports hx of withdrawal seizures several years ago.  Drinking about 1-1.5L vodka daily.  He reports some hallucinations and vomiting, as well as feeling nauseated and dizzy.  Not currently on AEDs.    - Continue MSSA with valium  - Folvite, multi-vites, thiamine supplementation  - Offer Hydroxyzine for anxiety PRN    - 1L NS bolus now   - Further management per Psychiatry     # Borderline hypomagnesemia - Mag 1.7.   - Mag Ox replacement 400mg daily x 2 doses     # Esophagitis   # ?Hematemesis   Underwent EGD in 2/2020.  Review of ED note from [facility] mentions complaints of abdominal pain and hematemesis, but not visualized since arrival to 3A.  No known hx varices. Hgb stable at 15.3.    - Continue PTA Carafate and   - Continue Protonix 40mg BID     # Elevated LFTs - , AST 75.  2:1 hepatocellular injury pattern c/w alcohol use.    - Repeat CMP in 1-2 days     Medicine will continue to follow along for repeat labs as above.  Recommendations relayed to primary team via this progress note.  Thank you for the opportunity to be involved in this patient's care.      Raias Huffman, CNP, APRN  Internal Medicine ROSI Hospitalist  [facility]  Pager [phone]           History of Present Illness:   History is obtained from the patient and medical  "record.     This patient is a 45 year old male with a history of alcohol use disorder, esophagitis, multiple concussions, Lyme disease s/p treatment with Doxycycline, depression, and anxiety admitted to station 3A for alcohol withdrawal and detox.      Internal Medicine service was asked to see patient for medical co-management of detox.  Paulo is seen in the exam room.  He reports feeling as though he is experiencing auditory hallucinations, and prefers to be in the group room and says \"if I am alone I am going to see or hear things that are not there\".  He reports feeling dizzy and unbalanced.  He denies any trouble walking around the unit.  His oral intake is slowly improving.  He was able to eat lunch today.  He reports a history of withdrawal seizures a couple of years ago.  Currently drinking around a liter of hard liquor daily.  He just says he feels \"off\".  He currently denies chest pain, dyspnea, abdominal pain, dysuria, and constipation.          Review of Systems:   A 10 point ROS was performed and negative unless otherwise noted in HPI.           Past Medical History:   Reviewed and updated in Epic.  Past Medical History:   Diagnosis Date     Substance abuse     Done with treatment on 7/16/2018             Past Surgical History:   Reviewed and updated in Epic.  Past Surgical History:   Procedure Laterality Date     HAND SURGERY Left              Social History:   Reviewed and updated in [employer].  Social History     Socioeconomic History     Marital status: Single     Spouse name: Not on file     Number of children: Not on file     Years of education: Not on file     Highest education level: Not on file   Occupational History     Not on file   Social Needs     Financial resource strain: Not on file     Food insecurity     Worry: Not on file     Inability: Not on file     Transportation needs     Medical: Not on file     Non-medical: Not on file   Tobacco Use     Smoking status: Never Smoker     Smokeless " tobacco: Never Used   Substance and Sexual Activity     Alcohol use: No     Comment: sober 4 months      Drug use: No     Sexual activity: Yes     Partners: Female     Birth control/protection: None   Lifestyle     Physical activity     Days per week: Not on file     Minutes per session: Not on file     Stress: Not on file   Relationships     Social connections     Talks on phone: Not on file     Gets together: Not on file     Attends Gnosticism service: Not on file     Active member of club or organization: Not on file     Attends meetings of clubs or organizations: Not on file     Relationship status: Not on file     Intimate partner violence     Fear of current or ex partner: Not on file     Emotionally abused: Not on file     Physically abused: Not on file     Forced sexual activity: Not on file   Other Topics Concern     Parent/sibling w/ CABG, MI or angioplasty before 65F 55M? Not Asked   Social History Narrative     Not on file              Family History:   Reviewed and updated in Epic.  Family History   Problem Relation Age of Onset     Polycystic Kidney Diease Mother      Substance Abuse Mother      Depression Mother      Heart Defect Father      Heart Defect Brother              Allergies:   No Known Allergies          Medications:     Current Facility-Administered Medications   Medication     acetaminophen (TYLENOL) tablet 650 mg     alum & mag hydroxide-simethicone (MAALOX) suspension 30 mL     atenolol (TENORMIN) tablet 50 mg     diazepam (VALIUM) tablet 5-20 mg     folic acid (FOLVITE) tablet 1 mg     hydrOXYzine (ATARAX) tablet 25 mg     loperamide (IMODIUM) capsule 2 mg     multivitamin w/minerals (THERA-VIT-M) tablet 1 tablet     ondansetron (ZOFRAN-ODT) ODT tab 4 mg     pantoprazole (PROTONIX) EC tablet 40 mg     sucralfate (CARAFATE) tablet 1 g     thiamine (B-1) tablet 100 mg     traZODone (DESYREL) tablet 50 mg            Physical Exam:   Blood pressure (!) 140/92, pulse 90, temperature 98.4  F  (36.9  C), temperature source Temporal, resp. rate 16, SpO2 98 %.  There is no height or weight on file to calculate BMI.    GENERAL: Alert and oriented x 3. Well nourished, well developed.  Thin body habitus. No acute distress.    HEENT: Normocephalic, atraumatic. Anicteric sclera. Mucous membranes moist.   CV: RRR. S1, S2. No murmurs appreciated.   RESPIRATORY: Effort normal on room air. Lungs CTAB with no wheezing, rales, or rhonchi.   GI: Abdomen soft and non distended, bowel sounds present x all 4 quadrants. No tenderness, rebound, or guarding.   NEUROLOGICAL: No focal deficits. Follows commands.  Strength equal in upper and lower extremities.   MUSCULOSKELETAL: No joint swelling or tenderness. Moves all extremities.   EXTREMITIES: No gross deformities. No peripheral edema. Intact bilateral pedal pulses.   SKIN: Grossly warm, dry, and intact. No jaundice. No rashes.             Data:   I personally reviewed the following studies:    ROUTINE IP LABS (Last four results)  CMP   Recent Labs   Lab 05/29/21  1315      POTASSIUM 3.7   CHLORIDE 107   CO2 25   ANIONGAP 10   *   BUN 6*   CR 0.70   TIMOTHY 8.6   PROTTOTAL 8.6   ALBUMIN 4.0   BILITOTAL 0.4   ALKPHOS 105   AST 75*   *     CBC   Recent Labs   Lab 05/29/21  1315   WBC 5.5   RBC 4.80   HGB 15.3   HCT 44.7   MCV 93   MCH 31.9   MCHC 34.2   RDW 14.2        INR No lab results found in last 7 days.        Unresulted Labs Ordered in the Past 30 Days of this Admission     No orders found for last 31 day(s).
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
## 2021-05-31 LAB
ALBUMIN SERPL-MCNC: 3.6 G/DL (ref 3.4–5)
ALP SERPL-CCNC: 87 U/L (ref 40–150)
ALT SERPL W P-5'-P-CCNC: 79 U/L (ref 0–70)
ANION GAP SERPL CALCULATED.3IONS-SCNC: 8 MMOL/L (ref 3–14)
AST SERPL W P-5'-P-CCNC: 49 U/L (ref 0–45)
BILIRUB SERPL-MCNC: 0.9 MG/DL (ref 0.2–1.3)
BUN SERPL-MCNC: 5 MG/DL (ref 7–30)
CALCIUM SERPL-MCNC: 8.9 MG/DL (ref 8.5–10.1)
CHLORIDE SERPL-SCNC: 104 MMOL/L (ref 94–109)
CO2 SERPL-SCNC: 28 MMOL/L (ref 20–32)
CREAT SERPL-MCNC: 0.74 MG/DL (ref 0.66–1.25)
GFR SERPL CREATININE-BSD FRML MDRD: >90 ML/MIN/{1.73_M2}
GLUCOSE SERPL-MCNC: 90 MG/DL (ref 70–99)
POTASSIUM SERPL-SCNC: 3.7 MMOL/L (ref 3.4–5.3)
PROT SERPL-MCNC: 7.5 G/DL (ref 6.8–8.8)
SODIUM SERPL-SCNC: 140 MMOL/L (ref 133–144)

## 2021-05-31 PROCEDURE — 250N000013 HC RX MED GY IP 250 OP 250 PS 637: Performed by: CLINICAL NURSE SPECIALIST

## 2021-05-31 PROCEDURE — H0001 ALCOHOL AND/OR DRUG ASSESS: HCPCS

## 2021-05-31 PROCEDURE — H2035 A/D TX PROGRAM, PER HOUR: HCPCS | Mod: HQ

## 2021-05-31 PROCEDURE — 80053 COMPREHEN METABOLIC PANEL: CPT | Performed by: NURSE PRACTITIONER

## 2021-05-31 PROCEDURE — 128N000004 HC R&B CD ADULT

## 2021-05-31 PROCEDURE — 99222 1ST HOSP IP/OBS MODERATE 55: CPT | Mod: AI | Performed by: PSYCHIATRY & NEUROLOGY

## 2021-05-31 PROCEDURE — 250N000013 HC RX MED GY IP 250 OP 250 PS 637: Performed by: NURSE PRACTITIONER

## 2021-05-31 PROCEDURE — 250N000013 HC RX MED GY IP 250 OP 250 PS 637: Performed by: PSYCHIATRY & NEUROLOGY

## 2021-05-31 PROCEDURE — 36415 COLL VENOUS BLD VENIPUNCTURE: CPT | Performed by: NURSE PRACTITIONER

## 2021-05-31 RX ORDER — OLANZAPINE 5 MG/1
5 TABLET, ORALLY DISINTEGRATING ORAL 3 TIMES DAILY PRN
Status: DISCONTINUED | OUTPATIENT
Start: 2021-05-31 | End: 2021-06-02 | Stop reason: HOSPADM

## 2021-05-31 RX ADMIN — FOLIC ACID 1 MG: 1 TABLET ORAL at 08:27

## 2021-05-31 RX ADMIN — DIAZEPAM 10 MG: 5 TABLET ORAL at 20:49

## 2021-05-31 RX ADMIN — PANTOPRAZOLE SODIUM 40 MG: 40 TABLET, DELAYED RELEASE ORAL at 08:27

## 2021-05-31 RX ADMIN — SUCRALFATE 1 G: 1 TABLET ORAL at 16:26

## 2021-05-31 RX ADMIN — HYDROXYZINE HYDROCHLORIDE 25 MG: 25 TABLET, FILM COATED ORAL at 08:32

## 2021-05-31 RX ADMIN — DIAZEPAM 10 MG: 5 TABLET ORAL at 08:32

## 2021-05-31 RX ADMIN — MULTIPLE VITAMINS W/ MINERALS TAB 1 TABLET: TAB at 08:27

## 2021-05-31 RX ADMIN — PANTOPRAZOLE SODIUM 40 MG: 40 TABLET, DELAYED RELEASE ORAL at 20:49

## 2021-05-31 RX ADMIN — SUCRALFATE 1 G: 1 TABLET ORAL at 22:08

## 2021-05-31 RX ADMIN — OLANZAPINE 5 MG: 5 TABLET, ORALLY DISINTEGRATING ORAL at 09:59

## 2021-05-31 RX ADMIN — OLANZAPINE 5 MG: 5 TABLET, ORALLY DISINTEGRATING ORAL at 18:28

## 2021-05-31 RX ADMIN — DIAZEPAM 10 MG: 5 TABLET ORAL at 14:15

## 2021-05-31 RX ADMIN — SUCRALFATE 1 G: 1 TABLET ORAL at 10:38

## 2021-05-31 RX ADMIN — DIAZEPAM 10 MG: 5 TABLET ORAL at 10:38

## 2021-05-31 RX ADMIN — DIAZEPAM 10 MG: 5 TABLET ORAL at 16:26

## 2021-05-31 RX ADMIN — SUCRALFATE 1 G: 1 TABLET ORAL at 08:27

## 2021-05-31 RX ADMIN — THIAMINE HCL TAB 100 MG 100 MG: 100 TAB at 08:27

## 2021-05-31 RX ADMIN — Medication 400 MG: at 08:27

## 2021-05-31 NOTE — PLAN OF CARE
"Nursing Assessment    Pt had anxious shift. Pt maintains blunted, flat affect, mood is calm. Visible and participative in the milieu . Pt did not attend group this shift. During check in, pt noted feeling dehydrated / \"icky\" but later improved after saline bolus. Pt denies all mental health symptoms including SI/SIB/HI/AVH. Pt contracts for safety on the unit. No other behavioral concerns at this time.      MSSA score 12 at  1600 -- 10mg valium administered  MSSA score 14 at  2000 -- 10mg valium administered  MSSA score 10 at  2300 -- 10mg valium administered    Pt took scheduled medications without issue.  PRN zofran administered to target nausea with some effectiveness  PRN hydroxyzine administered to target anxiety with no effectiveness  PRN propranolol administered x2 to target anxiety with more effectiveness    Pt denies any other concerns at this time. Will continue to monitor and support.     "

## 2021-05-31 NOTE — PLAN OF CARE
"Pt states he feels real anxious, clammy and shakey.  MSSA score 9 in AM.  Pt rates anxiety 8/10 and depression 5/10.  Pt denies any SI/SIB. Pt admits to some nausea and abdominal discomfort.  Pt received valium x 10 for MSSA and hydroxyzine for nausea/anxiety.  Pt Is disheveled in appearance.  Pt has a flat/blunt slightly anxious affect.  Pt at half of breakfast tray.  Pt admits to some auditory hallucinations when resting in room.  Pt has a history of seizure.  Writer gave pt prn zyprexa for auditory hallucinations. Pt states he is looking at the door feeling like he wants to make a run for it.  Pt is cooperative and pleasant. Pt MSSA score rechecked at 10:30 and scored a 9.  Pt received another 10 of valium.  Pt states zyprexa helped a little with auditory hallucinations but still there.  Pt states, \"this is the worst withdrawal I have ever had.\"    "

## 2021-05-31 NOTE — PROGRESS NOTES
[place] Prescription Monitoring Program   Paulo Franco, 45MPowered by [facility]  Narx Report  Resources  Date: 05/31/2021Download PDF  Paulo Franco  Risk Indicators  NARX SCORES  Narcotic  030  Sedative  060  Stimulant  000  Explanation and GuidanceOVERDOSE RISK SCORE 110  (Range 000-999)  Explanation and Guidance  ADDITIONAL RISK INDICATORS ( 0 )  Explanation and GuidanceThis [facility] report is based on search criteria supplied and the data entered by the dispensing pharmacy. For more information about any prescription, please contact the dispensing pharmacy or the prescriber. [facility] scores and reports are intended to aid, not replace, medical decision making. None of the information presented should be used as sole justification for providing or refusing to provide medications. The information on this report is not warranted as accurate or complete.  Graphs  RX GRAPH   Narcotic  Buprenorphine  Sedative  Stimulant  Other  All Prescribers  Prescribers  2 - Donaldo Jerry  1 - Jordyn Fontanez  Timeline  05/31  2m  6m  1y  2y  Buprenorphine mg  16  4  0  28  Timeline  05/31  2m  6m  1y  2y  Morphine MgEq (MME)  200  80  0  320  Timeline  05/31  2m  6m  1y  2y  Lorazepam MgEq (LME)  10  2  0  18  Timeline  05/31  2m  6m  1y  2y  *Per CDC guidance, the MME conversion factors prescribed or provided as part of the medication-assisted treatment for opioid use disorder should not be used to benchmark against dosage thresholds meant for opioids prescribed for pain. Buprenorphine products have no agreed upon morphine equivalency, and as partial opioid agonists, are not expected to be associated with overdose risk in the same dose-dependent manner as doses for full agonist opioids. MME = morphine milligram equivalents. LME = Lorazepam milligram equivalents. mg = dose in milligrams.  Summary  Summary  Total Prescriptions:  3  Total Prescribers:  2  Total Pharmacies:  2  Narcotics* (excluding Buprenorphine)  Current  Qty:  0  Current MME/day:  0.00  30 Day Avg MME/day:  0.00  Sedatives*  Current Qty:  0  Current LME/day:  0.00  30 Day Avg LME/day:  0.00  Buprenorphine*  Current Qty:  0  Current mg/day:  0.00  30 Day Avg mg/day:  0.00  Rx Data  PRESCRIPTIONS  Total Prescriptions: 3  Total Private Pay: 2  Fill Date ID Written Sold Drug Qty Days Prescriber Rx # Pharmacy Refill Daily Dose * Pymt Type   05/19/2021 1 05/19/2021 05/19/2021 Gabapentin 300 Mg Capsule  6.00 4  Qad [id number] All (4112) 0/0  Comm Ins [place]  08/07/2020 2 08/07/2020  Lorazepam 0.5 Mg Tablet  14.00 14 Br Tammie [id number] Thr (0695) 0/0 0.50 LME [place]  07/24/2020 2 07/24/2020  Lorazepam 0.5 Mg Tablet  18.00 9 Br Tammie [id number] Thr (0695) 0/0 1.00 LME [place]  *Per CDC guidance, the MME conversion factors prescribed or provided as part of the medication-assisted treatment for opioid use disorder should not be used to benchmark against dosage thresholds meant for opioids prescribed for pain. Buprenorphine products have no agreed upon morphine equivalency, and as partial opioid agonists, are not expected to be associated with overdose risk in the same dose-dependent manner as doses for full agonist opioids. MME = morphine milligram equivalents. LME = Lorazepam milligram equivalents. mg = dose in milligrams.  Providers  Total Providers: 2  Name Address [facility] Zipcode Phone  Donaldo Lee [address] [place] [address] [facility] [address] [phone]  Jordyn Dahl [address] [facility] [address] [phone]  Pharmacies  Total Pharmacies: 2  Name Address [facility] Zipcode Phone  [facility] [phone] [place] [address] [facility] [address] [phone]  [facility] [phone] [facility] [phone]  The report provided is based upon the search criteria entered and the corresponding data as it has been reported by dispenser(s). If erroneous information is identified or  additional information is needed, please contact the dispenser or the prescriber provided on the report. Date Sold signifies the date the prescription was sold (left the pharmacy). The absence of Date Sold does not necessarily indicate the prescription was not dispensed. Fill Date represents the date the medication was filled or prepared by the pharmacy. Note, federal regulation (CFR Title 42: Part 2) requires patient consent prior to releasing certain patient data from federally funded opioid treatment programs (OTPs). As such, controlled substances dispensed from OTPs for medication-assisted treatment may not appear in the [place]  report. Morphine milligram equivalent (MME) conversion factors published by the CDC are used in the MME calculation. Per the CDC, the MME conversion factor is intended only for analytic purposes where prescription data are used to retrospectively calculate daily MME to inform analyses of risks associated with opioid prescribing. This value does not constitute clinical guidance or recommendations for converting patients from one form of opioid analgesic to another. Per the CDC, the conversion factors for drugs prescribed or provided, as part of medication-assisted treatment for opioid use disorder should not be used to benchmark against MME dosage thresholds meant for opioids prescribed for pain. Buprenorphine products listed in the CDC s MME file do not have an associated conversion factor. Lastly, the CDC notes, in clinical practice, calculating MME for methadone often involves a sliding-scale approach, whereby the conversion factor increases with increasing dose. The conversion factor of 3 for methadone presented in this file could underestimate MME for a given patient. This report contains confidential information, including patient identifiers, and is not a public record. The information on this report must be treated as protected health information and is only to be disclosed to  others as authorized by applicable state and Federal regulations.

## 2021-05-31 NOTE — PROGRESS NOTES
Pt completed CD assessment, referral was faxed to [facility] (ADILIA on file). Pt was provided list with sober houses affiliated with [facility] program. States he will place calls to obtain housing. Writer will follow-up with [facility] regarding admission on 6/1 after holiday. Pt has Blue Plus for insurance, AVS initiated.

## 2021-05-31 NOTE — H&P
"[facility], [place]   Psychiatric History and Physical  Admission date: 5/29/2021        Chief Complaint:   \"Alcohol\"        HPI:     The patient is a 44yo male with a history of alcohol use disorder who was admitted for detoxification. The patient reports that he is \"really bad today.\" Reports shakes and heart palpitations. Says that he has been hearing things and \"seeing tracers.\" Says that he has never had \"the DTs so bad.\" Is feeling anxious. Denies any SI. Was able to sleep some last night. Has been on Naltrexone and Campral in the past and did not find them helpful. Is open to CD treatment.     Per ER:  Paulo Franco is a 45 year old male with a history of alcohol abuse who presents via EMS with alcohol intoxication. Patient was living in a sober house for 10 months and had a relapse so had been at an emergency \"second chance \"house but left 6 days ago.  He has been staying in hotels for 6 days now. He was sober for those 10 months but recently relapsed. Patient states he had an onset of blood streaks along his emesis yesterday with no episodes today. These are recurrent symptoms that occur when he relapses. Patient drinks 1 to 1.5 liter of vodka per day and is interested in inpatient treatment.  His last drink was 2 hours prior to arrival.  He states outpatient treatments have been unsuccessful for him. His last endoscopy was in 2/2020. Denies any fever, cough, diarrhea.        Past Psychiatric History:     Denies any history of suicide attempts.         Substance Use and History:     Alcohol use disorder. Reports a history of withdrawal seizures and DTs.   Says that he has tried Naltrexone and Campral in the past and did not find them helpful.         Past Medical History:   PAST MEDICAL HISTORY:   Past Medical History:   Diagnosis Date     Concussion      Substance abuse (H)     Done with treatment on 7/16/2018       PAST SURGICAL HISTORY:   Past Surgical History:   Procedure " Laterality Date     HAND SURGERY Left              Family History:   FAMILY HISTORY:   Family History   Problem Relation Age of Onset     Polycystic Kidney Diease Mother      Substance Abuse Mother      Depression Mother      Heart Defect Father      Heart Defect Brother            Social History:   Please see the full psychosocial profile from the clinical treatment coordinator.   SOCIAL HISTORY:   Social History     Tobacco Use     Smoking status: Never Smoker     Smokeless tobacco: Never Used   Substance Use Topics     Alcohol use: No     Comment: sober 4 months             Physical ROS:   The patient endorsed shakes, palpitations and hallucinations. The remainder of 10-point review of systems was negative except as noted in HPI.         PTA Medications:     Medications Prior to Admission   Medication Sig Dispense Refill Last Dose     multivitamin w/minerals (THERA-VIT-M) tablet Take 1 tablet by mouth daily   Past Week     pantoprazole (PROTONIX) 40 MG EC tablet Take 40 mg by mouth 2 times daily   Past Week     sucralfate (CARAFATE) 1 GM tablet Take 1 g by mouth 4 times daily (before meals and nightly)   Past Week          Allergies:   No Known Allergies       Labs:     Recent Results (from the past 48 hour(s))   CBC with platelets differential    Collection Time: 05/29/21  1:15 PM   Result Value Ref Range    WBC 5.5 4.0 - 11.0 10e9/L    RBC Count 4.80 4.4 - 5.9 10e12/L    Hemoglobin 15.3 13.3 - 17.7 g/dL    Hematocrit 44.7 40.0 - 53.0 %    MCV 93 78 - 100 fl    MCH 31.9 26.5 - 33.0 pg    MCHC 34.2 31.5 - 36.5 g/dL    RDW 14.2 10.0 - 15.0 %    Platelet Count 354 150 - 450 10e9/L    Diff Method Automated Method     % Neutrophils 55.0 %    % Lymphocytes 35.3 %    % Monocytes 7.4 %    % Eosinophils 1.4 %    % Basophils 0.7 %    % Immature Granulocytes 0.2 %    Nucleated RBCs 0 0 /100    Absolute Neutrophil 3.0 1.6 - 8.3 10e9/L    Absolute Lymphocytes 2.0 0.8 - 5.3 10e9/L    Absolute Monocytes 0.4 0.0 - 1.3 10e9/L     Absolute Eosinophils 0.1 0.0 - 0.7 10e9/L    Absolute Basophils 0.0 0.0 - 0.2 10e9/L    Abs Immature Granulocytes 0.0 0 - 0.4 10e9/L    Absolute Nucleated RBC 0.0    Comprehensive metabolic panel    Collection Time: 05/29/21  1:15 PM   Result Value Ref Range    Sodium 142 133 - 144 mmol/L    Potassium 3.7 3.4 - 5.3 mmol/L    Chloride 107 94 - 109 mmol/L    Carbon Dioxide 25 20 - 32 mmol/L    Anion Gap 10 3 - 14 mmol/L    Glucose 115 (H) 70 - 99 mg/dL    Urea Nitrogen 6 (L) 7 - 30 mg/dL    Creatinine 0.70 0.66 - 1.25 mg/dL    GFR Estimate >90 >60 mL/min/[1.73_m2]    GFR Estimate If Black >90 >60 mL/min/[1.73_m2]    Calcium 8.6 8.5 - 10.1 mg/dL    Bilirubin Total 0.4 0.2 - 1.3 mg/dL    Albumin 4.0 3.4 - 5.0 g/dL    Protein Total 8.6 6.8 - 8.8 g/dL    Alkaline Phosphatase 105 40 - 150 U/L     (H) 0 - 70 U/L    AST 75 (H) 0 - 45 U/L   Alcohol ethyl    Collection Time: 05/29/21  1:15 PM   Result Value Ref Range    Ethanol g/dL 0.36 (HH) <0.01 g/dL   Asymptomatic SARS-CoV-2 COVID-19 Virus (Coronavirus) by PCR    Collection Time: 05/29/21  1:15 PM    Specimen: Nasopharyngeal   Result Value Ref Range    SARS-CoV-2 Virus Specimen Source Nasopharyngeal     SARS-CoV-2 PCR Result NEGATIVE     SARS-CoV-2 PCR Comment (Note)    Lipase    Collection Time: 05/29/21  1:15 PM   Result Value Ref Range    Lipase 224 73 - 393 U/L   Drug abuse screen urine    Collection Time: 05/29/21  5:55 PM   Result Value Ref Range    Amphetamine Qual Urine Negative NEG^Negative    Barbiturates Qual Urine Negative NEG^Negative    Benzodiazepine Qual Urine Positive (A) NEG^Negative    Cannabinoids Qual Urine Negative NEG^Negative    Cocaine Qual Urine Negative NEG^Negative    Opiates Qualitative Urine Negative NEG^Negative    PCP Qual Urine Negative NEG^Negative   Alcohol breath test POCT    Collection Time: 05/29/21  6:22 PM   Result Value Ref Range    Alcohol Breath Test 0.177 (A) 0.00 - 0.01   GGT    Collection Time: 05/30/21  8:18 AM   Result  Value Ref Range    GGT 72 0 - 75 U/L   Lipid panel    Collection Time: 05/30/21  8:18 AM   Result Value Ref Range    Cholesterol 309 (H) <200 mg/dL    Triglycerides 59 <150 mg/dL    HDL Cholesterol 72 >39 mg/dL    LDL Cholesterol Calculated 225 (H) <100 mg/dL    Non HDL Cholesterol 237 (H) <130 mg/dL   TSH with free T4 reflex and/or T3 as indicated    Collection Time: 05/30/21  8:18 AM   Result Value Ref Range    TSH 1.04 0.40 - 4.00 mU/L   Magnesium    Collection Time: 05/30/21  8:18 AM   Result Value Ref Range    Magnesium 1.7 1.6 - 2.3 mg/dL          Physical and Psychiatric Examination:     /74   Pulse 86   Temp 97.3  F (36.3  C) (Temporal)   Resp 14   SpO2 98%   Weight is 0 lbs 0 oz  There is no height or weight on file to calculate BMI.    Physical Exam:  I have reviewed the physical exam as documented by by the medical team and agree with findings and assessment and have no additional findings to add at this time.    Mental Status Exam:  Appearance: awake, alert and adequately groomed  Attitude:  cooperative  Eye Contact:  good  Mood:  anxious  Affect:  mood congruent  Speech:  clear, coherent  Language: fluent and intact in English  Psychomotor, Gait, Musculoskeletal:  no evidence of tardive dyskinesia, dystonia, or tics  Thought Process:  goal oriented  Associations:  no loose associations  Thought Content:  no evidence of suicidal ideation or homicidal ideation, auditory hallucinations present and visual hallucinations present  Insight:  fair  Judgement:  intact  Oriented to:  time, person, and place  Attention Span and Concentration:  intact  Recent and Remote Memory:  fair  Fund of Knowledge:  appropriate         Admission Diagnoses:   Alcohol withdrawal with perceptual disturbance         Assessment & Plan:     1) MSSA with Valium.   2) Start Zyprexa 5mg TID PRN hallucinations.   3) CTC to discuss options for CD treatment with patient.     Disposition Plan   Reason for ongoing admission:  requires detoxification from substance that poses a risk of bodily harm during withdrawal period  Discharge location: Chemical dependency treatment facility  Discharge Medications: not ordered  Follow-up Appointments: not scheduled  Legal Status: voluntary    Entered by: Carlyle Ann on 5/31/2021 at 6:37 AM

## 2021-05-31 NOTE — PLAN OF CARE
Behavioral Team Discussion: (5/31/2021)    Continued Stay Criteria/Rationale: Patient admitted for alcohol withdrawal and Use Disorder.  Plan: The following services will be provided to the patient; psychiatric assessment, medication management, therapeutic milieu, individual and group support, and skills groups.   Participants: 3A Provider: Dr. Yasir Gonzales MD; 3A RN: Sophia Macdonald RN; 3A CM's: Lesa Montes and Dea Florez.  Summary/Recommendation: Providers will assess today for treatment recommendations, discharge planning, and aftercare plans. CM will meet with pt for discharge planning.   Medical/Physical: Hx of esophagitis and Lyme's diseases  Precautions:   Behavioral Orders   Procedures     Code 1 - Restrict to Unit     Routine Programming     As clinically indicated     Status 15     Every 15 minutes.     Withdrawal precautions     Rationale for change in precautions or plan: N/A  Progress: No Change.

## 2021-05-31 NOTE — PROGRESS NOTES
"[facility]  UNIVERSAL ADULT DIAGNOSTIC ASSESSMENT - Substance Use Disorder    Provider Name and Credentials: Mya Cabrera, MS, [facility]     PATIENT'S NAME: Paulo Franco  PREFERRED NAME: Antonio  PRONOUNS: he/him/his     MRN: [id number]  : 1975   Last 4 SSN: [id number]  ACCT. NUMBER:  [id number]  DATE OF SERVICE: 2021   START TIME: 915  END TIME: 1017  PREFERRED PHONE: [phone]   May we leave a program related message: Yes  SERVICE MODALITY:  Phone Visit:      Provider verified identity through the following two step process.  Patient provided:  Patient  and Patient's last 4 digits of SSN    The patient has been notified of the following:      \"We have found that certain health care needs can be provided without the need for a face to face visit.  This service lets us provide the care you need with a phone conversation.       I will have full access to your [facility] medical record during this entire phone call.   I will be taking notes for your medical record.      Since this is like an office visit, we will bill your insurance company for this service.       There are potential benefits and risks of telephone visits (e.g. limits to patient confidentiality) that differ from in-person visits.?Confidentiality still applies for telephone services, and nobody will record the visit.  It is important to be in a quiet, private space that is free of distractions (including cell phone or other devices) during the visit.??      If during the course of the call I believe a telephone visit is not appropriate, you will not be charged for this service\"     Consent has been obtained for this service by care team member: Yes       Identifying Information:  Patient is a 45 year old,  male who was referred for an assessment by self. The pronoun use throughout this assessment reflects the patient's chosen pronoun. Patient attended the session alone.     Chief Complaint:   The reason for seeking " "services at this time is: \"To get help with alcohol\"  The problem(s) began at age 16. Patient has attempted to resolve these concerns in the past through treatment.  Patient is in active withdrawal, but is currently admitted to [facility] for medical detoxification and withdrawal monitoring and is not an imminent safety risk to self or others, and may proceed with the assessment interview    Social/Family History:  Patient reported he grew up in [place], [place]. Patient was raised by his biological parents. Patient reported that his childhood was good.  Patient describes current relationships with family of origin as positive.      The patient describes his cultural background as white.  Cultural influences and impact on patient's life structure, values, norms, and healthcare: None identified.  Contextual influences on patient's health include: Individual Factors KEENAN.  Patient identified his preferred language to be English. Patient reported he does not need the assistance of an  or other support involved in therapy.     Patient reports he is not involved in community of shahram activities. Patients reports spirituality impacts his recovery in the following ways:  Pt reports he has a good spiritual program.  Biking and hiking on a daily basis.  Likes to go to [employer] and connects with nature.     Patient reported had no significant delays in developmental tasks.  Patient's highest education level was associates degree. Patient identified the following learning problems: attention.  Patient reports he is able to understand written materials.    Patient reported the following relationship history.  Patient's current relationship status is single for his lifetime.   Patient identified his sexual orientation as straight.  Patient reported having zero child(kaylah).     Patient's current living/housing situation involves homelessness. Was in sober housing with [facility].  He relapsed and went to second " "chance housing and he learned that his ex drank herself to death and he relapsed again..  Patient is homeless and he reports that housing is not stable. Patient identified mother, friends and AA members as part of his support system.  Patient identified the quality of these relationships as fair.      Patient reports engaging in the following recreational/leisure activities: biking. Patient is currently unemployed.  Patient reports his income is obtained through general assistance.  Patient does identify finances as a current stressor.      Patient reports the following substance related arrests or legal issues: Pt has a August order with mom..  Patient does report being on probation / parole / under the jurisdiction of the court: : [place]: [place]  [place]: [place]  Probation Expiration date: uncertain, a couple years from now..    Patient's Strengths and Limitations:  Patient identified the following strengths or resources that will help him succeed in treatment: \"I want this\". Things that may interfere with the patient's success in treatment include: legal problems.     Personal and Family Medical History:   Patient did report a family history of mental health concerns.  Patient reports the following family history:   Family History   Problem Relation Age of Onset     Polycystic Kidney Diease Mother      Substance Abuse Mother      Depression Mother      Heart Defect Father      Heart Defect Brother         Patient reported the following previous mental health diagnoses: ADHD.  Patient reports their primary mental health symptoms include:  No current Mh diagnosis and these do not impact his ability to function.   Patient has not received mental health services in the past: 0.  Psychiatric Hospitalizations: None.  Patient denies a history of civil commitment.  Current mental health services/providers include:  None.    GAIN-SS:  GAIN-SS Tool:    No flowsheet data found.No flowsheet " data found.    When was the last time that you had significant problems...  a. with feeling very trapped, lonely, sad, blue, depressed or hopeless about the future? 2 - 12 months ago  b. with sleep trouble, such as bad dreams, sleeping restlessly, or falling asleep during the day? 2 - 12 months ago  c. with feeling very anxious, nervous, tense, scared, panicked or like something bad was going to happen?  Past Month  d. with becoming very distressed and upset when something reminded you of the past?  Past Month  e. with thinking about ending your life or committing suicide?  Past Month  When was the last time that you did the following things two or more times?  a. Lied or conned to get things you wanted or to avoid having to do something?   Past Month  b. Had a hard time paying attention at school, work or home? 2 - 12 months ago  c. Had a hard time listening to instructions at school, work or home?  2 - 12 months ago  d. Were a bully or threatened other people?  2 - 12 months ago  e. Started physical fights with other people?  2 - 12 months ago    Patient has had a physical exam to rule out medical causes for current symptoms.  Date of last physical exam was within the past year. Client was encouraged to follow up with PCP if symptoms were to develop. The patient does not have a Primary Care Provider and was encouraged to establish care with a PCP.. Patient reports no current medical concerns.  Patient denies any issues with pain. There are not significant appetite / nutritional concerns / weight changes. Patient does not report a history of an eating disorder. Patient does report a history of head injury / trauma / cognitive impairment.  Pt reports a hx of concussions from wrestling in high school and college.  No cognitive impairment.    Patient reports not taking any current medications    Medication Adherence:  N/A    Patient Allergies:  No Known Allergies    Medical History:    Past Medical History:    Diagnosis Date     Concussion      Substance abuse (H)     Done with treatment on 7/16/2018       Rating Scales:    PHQ9:  No flowsheet data found.;      Substance Use:  Patient reported the following biological family members or relatives with chemical health issues: Mother.  Patient has received substance use disorder and/or gambling treatment in the past.  Patient reports the following dates and locations of treatment services:  Multiple.  Patient has been to detox.  Patient is not currently receiving any chemical dependency treatment. Patient reports they have attended the following support groups: AA in the past. and they currently attend the following support groups: AA.        Substance Age of first use Pattern and duration of use (include amounts and frequency) Date of last use     Withdrawal potential Route of administration   Has used Alcohol 16 long history of alcoholism. He had been in a sober house for 10 months, had a relapse, and then moved to [facility] which he left 1 week ago on Monday. He states he has been drinking since that time period. He drinks approximately 1-1.5 pints of vodka per day. 5/29/21 Yes oral        Patient reported the following problems as a result of their substance use: family problems, financial problems, legal issues, occupational / vocational problems and relationship problems.  Patient is concerned about substance use.     Patient reports experiencing the following withdrawal symptoms within the past 12 months: sweating, shaky/jittery/tremors, unable to sleep, agitation, headache, fatigue, sad/depressed feeling, muscle aches, vivid/unpleasant dreams, irritability, sensitivity to noise, high blood pressure, nausea/vomiting, dizziness, seizures, diarrhea, diminished appetite, hallucinations, unable to eat, fever, psychosis, confused/disrupted speech and anxiety/worry and the following within the past 30 days: sweating, shaky/jittery/tremors, unable to sleep,  "agitation, headache, fatigue, sad/depressed feeling, vivid/unpleasant dreams, irritability, sensitivity to noise, high blood pressure, nausea/vomiting, dizziness, diarrhea, diminished appetite, hallucinations, unable to eat, confused/disrupted speech and anxiety/worry.   Patients reports urges to use Alcohol.  Patient reports he has used more Alcohol than intended and over a longer period of time than intended. Patient reports he has had unsuccessful attempts to cut down or control use of Alcohol.  Patient reports longest period of abstinence was recently 10 months and return to use was due to employment stress and learning of the death of an ex GF. Patient reports he has needed to use more Alcohol to achieve the same effect.  Patient does  report diminished effect with use of same amount of Alcohol.     Patient does  report a great deempal of time is spent in activities necessary to obtain, use, or recover from Alcohol effects.  Patient does  report important social, occupational, or recreational activities are given up or reduced because of Alcohol use.  Alcohol use is continued despite knowledge of having a persistent or recurrent physical or psychological problem that is likely to have caused or exacerbated by use.  Patient reports the following problem behaviors while under the influence of substances:  \"I become combative and physically agressive. Patient reports his recovery goals are \"to get back into living and working and AA\".     Patient reports substance use has impacted his ability to function in a school setting. Patient reports substance use has impacted his ability to function in a work setting.  Patients demographics and history impact his recovery in the following ways:  Pt has a long history of alcohol use and has had difficulty maintaining sobriety. Patient reports engaging in the following recreation/leisure activities while using:  biking.  Patient reports the following people are supportive of " recovery: FAmily, friends, and AA.     Patient does not have a history of gambling concerns and/or treatment. Patient does not have other addictive behaviors he is concerned about.       Dimension Scale Ratings:    Dimension 1 -  Acute Intoxication/Withdrawal: 1 - Minor Problem  Dimension 2 - Biomedical: 1 - Minor Problem  Dimension 3 - Emotional/Behavioral/Cognitive Conditions: 2 - Moderate Problem  Dimension 4 - Readiness to Change:  2 - Moderate Problem  Dimension 5 - Relapse/Continued Use/ Continued Problem Potential: 4 - Extreme Problem  Dimension 6 - Recovery Environment:  3 - Severe Problem    Significant Losses / Trauma / Abuse / Neglect Issues:   Patient did not serve in the .  There are indications or report of significant loss, trauma, abuse or neglect issues related to: death of ex GF, job loss recent, homelessness on and off and general loss of relationships due to alcohol use.  Concerns for possible neglect are not present.     Safety Assessment:   Current Safety Concerns:  Arthur Suicide Severity Rating Scale (Short Version)  Arthur Suicide Severity Rating (Short Version) 5/29/2021   Over the past 2 weeks have you felt down, depressed, or hopeless? no   Over the past 2 weeks have you had thoughts of killing yourself? no   Have you ever attempted to kill yourself? no     Patient denies current homicidal ideation and behaviors.  Patient denies current self-injurious ideation and behaviors.    Patient becomes assaultive associated with substance use.  Patient denies any high risk behaviors associated with mental health symptoms.  Patient reports the following current concerns for their personal safety: None.  Patient reports there are not  firearms in the house.      History of Safety Concerns:  Patient denied a history of homicidal ideation.     Patient denied a history of personal safety concerns.    Patient denied a history of assaultive behaviors.    Patient denied a history of sexual  assault behaviors.     Patient becomes assaultive associated with substance use.  Patient denies any history of high risk behaviors associated with mental health symptoms.  Patient reports the following protective factors: spirituality, positive relationships positive social network, sense of belonging *, strong sense of self-worth/esteem and pets    Risk Plan:  See Recommendations for Safety and Risk Management Plan    Review of Symptoms per patient report:  Substance Use:  blackouts, passing out, vomiting, hangovers, daily use, substance related legal problems, substance use at school, substance use at work, substance related decrease in work performance, skipping school due to substance use, work absence due to substance use, decrease in school performance, family relationship problems due to substance use, social problems related to substance use, driving under the influence, riding with someone under the influence and cravings/urges to use     Diagnostic Criteria:  OP BEH KEENAN CRITERIA: Substance is often taken in larger amounts or over a longer period than was intended.  Met for:  Alcohol, There is persistent desire or unsuccessful efforts to cut down or control use of the substance.  Met for:  Alcohol,  A great deal of time is spent in activities necessary to obtain the substance, use the substance, or recover from its effects.  Met for:  Alcohol, Craving, or a strong desire or urge to use the substance.  Met for:  Alcohol, Recurrent use of the substance resulting in a failure to fulfill major role obligations at work, school, or home.  Met for:  Alcohol, Continued use of the substance despite having persistent or recurrent social or interpersonal problems caused or exacerbated by the effects of its use.  Met for:  Alcohol, Important social, occupational, or recreational activities are given up or reduced because of the substance.  Met for:  Alcohol, Recurrent use of the substance in which it is physically  hazardous.  Met for:  Alcohol, Use of the substance is continued despite knowledge of having a persistent or recurrent physical or psychological problem that is likely to have been cause or exacerbated by the substance.  Met for:  Alcohol, Tolerance:  either a need for markedly increased amounts of the substance to achieve the desired effect or a markedly diminished effect with continued use of the dame amount of the substance.  Met for:  Alcohol, Withdrawal:  either patient endorses characteristic withdrawal syndrome for the substance or the substance (or closely related substance) is taken to relieve or avoid withdrawal symptoms.  Met for:  Alcohol       As evidenced by self report and criteria, client meets the following DSM5 Diagnoses:   (Sustained by DSM5 Criteria Listed Above)  Alcohol Use Disorder   303.90 (F10.20) Severe In a controlled environment.    Recommendations:     1. Plan for Safety and Risk Management:   Recommended that patient call 911 or go to the local ED should there be a change in any of these risk factors..            Report to child / adult protection services was NA.     2. Patient's identified no cultural concerns.     3. Recommendations for treatment focus:    Alcohol / Substance Use - sober living skills.      4.  Mental Health Referrals:   The following referral(s) will be initiated: Outpatient Chemical Health Treatment with [facility]. Next Scheduled Appointment: TBD.    5. KEENAN Referrals:    Recommendations:  IOP with [facility] or residential treatment.  Patient reports they are willing to follow these recommendations. Patient does not have a history of opiate use.    6. Records:   These were reviewed at time of assessment.  Information in this assessment was obtained from the medical record and  provided by patient who is a limited historian.   Patient will have open access to their mental health medical record.    [facility] Assessment ID:  [id number]  Provider Name/ Credentials:  Mya Cabrera MS, [facility]   May 31, 2021

## 2021-05-31 NOTE — PROGRESS NOTES
Pt.appeared to have a comfortable and stable night. He slept through the night. He did not endorse any concerns or make requests. MSSA scores overnight were 5 & 5. No safety or behavioral concerns. Will continue to monitor.

## 2021-05-31 NOTE — PROGRESS NOTES
Internal Medicine - Brief Progress Note    Internal medicine following for transaminitis in setting of acute alcohol withdrawal.  LFTs improved today from prior.    - No further lab checks needed this hospital stay unless new medical issue arises  - Follow up with PCP for repeat CMP and mag as OP     Currently, this patient is medically stable and medicine will sign off. Please page the Internal Medicine job code pager for any intercurrent medical issues which arise. Thank you for the opportunity to be a part of this patient's care.    Nury Vaughn, MPH, [facility]  Hospitalist Service  Pager [phone]

## 2021-06-01 PROCEDURE — H2035 A/D TX PROGRAM, PER HOUR: HCPCS

## 2021-06-01 PROCEDURE — 250N000013 HC RX MED GY IP 250 OP 250 PS 637: Performed by: CLINICAL NURSE SPECIALIST

## 2021-06-01 PROCEDURE — 99231 SBSQ HOSP IP/OBS SF/LOW 25: CPT | Performed by: PSYCHIATRY & NEUROLOGY

## 2021-06-01 PROCEDURE — 128N000004 HC R&B CD ADULT

## 2021-06-01 RX ADMIN — SUCRALFATE 1 G: 1 TABLET ORAL at 08:56

## 2021-06-01 RX ADMIN — HYDROXYZINE HYDROCHLORIDE 25 MG: 25 TABLET, FILM COATED ORAL at 11:06

## 2021-06-01 RX ADMIN — TRAZODONE HYDROCHLORIDE 50 MG: 50 TABLET ORAL at 21:51

## 2021-06-01 RX ADMIN — HYDROXYZINE HYDROCHLORIDE 25 MG: 25 TABLET, FILM COATED ORAL at 20:16

## 2021-06-01 RX ADMIN — PANTOPRAZOLE SODIUM 40 MG: 40 TABLET, DELAYED RELEASE ORAL at 20:16

## 2021-06-01 RX ADMIN — MULTIPLE VITAMINS W/ MINERALS TAB 1 TABLET: TAB at 08:56

## 2021-06-01 RX ADMIN — FOLIC ACID 1 MG: 1 TABLET ORAL at 08:55

## 2021-06-01 RX ADMIN — PANTOPRAZOLE SODIUM 40 MG: 40 TABLET, DELAYED RELEASE ORAL at 08:56

## 2021-06-01 RX ADMIN — ACETAMINOPHEN 650 MG: 325 TABLET, FILM COATED ORAL at 17:10

## 2021-06-01 RX ADMIN — THIAMINE HCL TAB 100 MG 100 MG: 100 TAB at 08:56

## 2021-06-01 RX ADMIN — SUCRALFATE 1 G: 1 TABLET ORAL at 21:07

## 2021-06-01 RX ADMIN — SUCRALFATE 1 G: 1 TABLET ORAL at 11:06

## 2021-06-01 RX ADMIN — SUCRALFATE 1 G: 1 TABLET ORAL at 16:26

## 2021-06-01 ASSESSMENT — ACTIVITIES OF DAILY LIVING (ADL)
HYGIENE/GROOMING: INDEPENDENT;PROMPTS
LAUNDRY: UNABLE TO COMPLETE
ORAL_HYGIENE: PROMPTS
DRESS: INDEPENDENT;SCRUBS (BEHAVIORAL HEALTH)
LAUNDRY: UNABLE TO COMPLETE
HYGIENE/GROOMING: INDEPENDENT;PROMPTS
ORAL_HYGIENE: PROMPTS
DRESS: INDEPENDENT

## 2021-06-01 NOTE — PROGRESS NOTES
[facility], [place]   Psychiatric Progress Note        Interim history   This is a 45 year old male with sqh7edi use dx severe .Pt seen in rounds.   The patient's care was discussed with the treatment team during the daily team meeting and/or staff's chart notes were reviewed.  Staff report patient has been visible in the milieu,  no acute eventsovernight.     Patient's mood is rough anxiety  Energy Level:MODERATE  Sleep:poor  Appetite:fair  Improving  motivation interest   Denied any Suicidal/homicidal ideation/plan intent.  Denied any psychosisbut seeing tracer  No prior suicde attempts  No access to gun  Pt is in alcohol withdrawal still being monitered every 4 hrs for it,   Pt mssa score are monitered  Tolerating meds and has no side effects.              Medications:     Current Facility-Administered Medications   Medication     acetaminophen (TYLENOL) tablet 650 mg     alum & mag hydroxide-simethicone (MAALOX) suspension 30 mL     atenolol (TENORMIN) tablet 50 mg     diazepam (VALIUM) tablet 5-20 mg     folic acid (FOLVITE) tablet 1 mg     hydrOXYzine (ATARAX) tablet 25 mg     loperamide (IMODIUM) capsule 2 mg     multivitamin w/minerals (THERA-VIT-M) tablet 1 tablet     OLANZapine zydis (zyPREXA) ODT tab 5 mg     ondansetron (ZOFRAN-ODT) ODT tab 4 mg     pantoprazole (PROTONIX) EC tablet 40 mg     propranolol (INDERAL) tablet 10-20 mg     sucralfate (CARAFATE) tablet 1 g     thiamine (B-1) tablet 100 mg     traZODone (DESYREL) tablet 50 mg             Allergies:   No Known Allergies         Psychiatric Examination:   Blood pressure 112/77, pulse 107, temperature 97.8  F (36.6  C), temperature source Temporal, resp. rate 16, SpO2 98 %.  Weight is 0 lbs 0 oz  There is no height or weight on file to calculate BMI.    Appearance:  awake, alert and adequately groomed  Attitude:  cooperative  Eye Contact:  good  Mood:  better  Affect:  appropriate and in normal range and mood  congruent  Speech:  clear, coherent rate /rhythm are good  Psychomotor Behavior:  no evidence of tardive dyskinesia, dystonia, or tics and intact station, gait and muscle tone  Throught Process:  logical  Associations:  no loose associations  Thought Content:  no evidence of suicidal ideation or homicidal ideation, no evidence of psychotic thought, no auditory hallucinations present and no visual hallucinations present  Insight:  fair  Judgement:  intact  Oriented to:  time, person, and place  Attention Span and Concentration:  intact  Recent and Remote Memory:  intact  Language fund of knowledge are adequate         Labs:     No results found for: NTBNPI, NTBNP  Lab Results   Component Value Date    WBC 5.5 05/29/2021    HGB 15.3 05/29/2021    HCT 44.7 05/29/2021    MCV 93 05/29/2021     05/29/2021     Lab Results   Component Value Date    TSH 1.04 05/30/2021         DX alcohol use dx severe     PLAN   Alcohol intoxication/withdrawal, presently is on MSSA protocol with Valium. Continue the same MSSA protocol as ordered. Continue thiamine 100 mg p.o. daily, M.V.I. one p.o. daily and folate 1 mg p.o. Daily  Will continue mssa protocal to detox off alcohol on valium,  Pt is c/o of termor , agitation poor sleep and poor appetite, he has sweats, feels shakey seeing  tracers   On mssa client scored scored 3today and  needed needed 0 mg po as of yet , total dose since admission was 140mg     MSSA    Eating Disturbances: ate and enjoyed all of it or not applicable  Tremor: 1 - not visibly apparent but can be felt by the examiner placing his fingertip slightly against the patient's fingertips  Sleep Disturbance: slept through the night or not applicable  Clouding of Sensorium: no evidence  Hallucinations: 0 - none  Quality of Contact: 0 - awareness of examiner and people around him/her  Agitation: 0 - normal activity  Paroxysmal Sweats: 1 - barely perceptible sweating  Temperature: 99.5 or below  Pulse: 0 - 69 or  below  Total MSSA Score: 3    Laboratory/Imaging: reviewed with patient   Consults: internal medicine consult reviewed  Patient will be treated in therapeutic milieu with appropriate individual and group therapies as described.  PDMP CHECKED     Supportive psychotheraoy provided, roselyn talked about recovery enviroment, relapse prevention, triggers to use.  Discussed with patient many issues of addiction,triggers, relapse, and establishing a solid recovery program.  Asked pt to be med complinat   Medical diagnoses to be addressed this admission:    Plan:    Paulo Franco is a 45 year old male with a history of alcohol use disorder, esophagitis, multiple concussions, Lyme disease s/p treatment with Doxycycline, depression, and anxiety admitted to station 3A for alcohol withdrawal and detox.       # Alcohol withdrawal, hx of alcohol use disorder - MSSA 10 this shift.  Reports hx of withdrawal seizures several years ago.  Drinking about 1-1.5L vodka daily.  He reports some hallucinations and vomiting, as well as feeling nauseated and dizzy.  Not currently on AEDs.    - Continue MSSA with valium  - Folvite, multi-vites, thiamine supplementation  - Offer Hydroxyzine for anxiety PRN    - 1L NS bolus now   - Further management per Psychiatry      # Borderline hypomagnesemia - Mag 1.7.   - Mag Ox replacement 400mg daily x 2 doses      # Esophagitis   # ?Hematemesis   Underwent EGD in 2/2020.  Review of ED note from [facility] mentions complaints of abdominal pain and hematemesis, but not visualized since arrival to 3A.  No known hx varices. Hgb stable at 15.3.    - Continue PTA Carafate and   - Continue Protonix 40mg BID      # Elevated LFTs - , AST 75.  2:1 hepatocellular injury pattern c/w alcohol use.    - Repeat CMP in 1-2 days   Legal Status: voluntary    Safety Assessment:   Checks:  15 min  Precautions: withdrawal precautions  Pt has not required locked seclusion or restraints in the past 24 hours to maintain safety,  please refer to RN documentation for further details.  Discussed with patient many issues of addiction,triggers, relapse, and establishing a solid recovery program.  Able to give informed consent:  YES   Discussed Risks/Benefits/Side Effects/Alternatives: YES    After discussion of the indications, risks, benefits, alternatives and consequences of no treatment, the patient elects to complete detox and do trt.

## 2021-06-01 NOTE — PLAN OF CARE
Behavioral  Pt oriented x 4; Pt anxious, pleasant and cooperative upon approach;  pt compliant with medications and cares;  Pt rates appetite as good, eating and hydrating adequately; Pt social with staff and peers; attended groups; speech fluent and appropriate; Pt denied SI, SIB, HI, and hallucinations; Pt presents with anxious affect and depressded mood;     Medical  Pt continues in alcohol withdrawal; MSSA 5; no valium given this shift; pt has received 140 mg of valium since admission; pt last valium on 5/31 at 2049.    Pt given PRN hydroxyzine for anxiety.    Plan  Possible discharge 6/2

## 2021-06-01 NOTE — PROGRESS NOTES
" 21 1300   Groups   Details    Group Psychotherapy    Number of patients attending the group:  13 pts  Group Length:  1 hour     Group Therapy Type: Psychotherapy     Summary of Group / Topics Discussed:- Challenging Negative thoughts    The  Psychotherapy group goal is to promote insight to positive choice and change. Group processing is within a supportive and safe environment. Patients will process emotions using verbal group and expressive psychotherapy interventions including visual art/writing interventions.     Group interventions support patients by: self compassion, empowerment and reflection     Modalities to reach these goals include: Process group / DBT     Subjective -patient report of mood today-\" I've been better.\"     Objective/ Intervention- Goal of group and Therapeutic modality utilized- process/ recovery/ DBT     Group Response-  engaged large group, supportive and encouraging to peers     Patient Response-Pt was engaged. He was pleasant and cooperative. He disclosed that he was grieving an ex- girlfriend who  of alcoholism recently.    Gurvinder Loyola, JIMFT, ATR-BC             "

## 2021-06-01 NOTE — PLAN OF CARE
"Problem: Alcohol Withdrawal  Goal: Alcohol Withdrawal Symptom Control  Outcome: No Change  Patient was visible on the unit and interacted with peers. He had a flat, blunted affect and endorsed anxiety and hallucinations (\"seeing floaters and hearing voices\") but was overall pleasant and cooperative. His MSSA scores were 11 and 9 and he received Valium 10 mg twice. Patient also given Zyprexa 5 mg for hallucinations with effect. He was complaint with medications, ate meals, and attended groups.  "

## 2021-06-01 NOTE — PROGRESS NOTES
Pt reports he spoke with his sober house, [facility], and they are letting him return to their sober house. Pt reports he can return there after discharge from hospital. Pt is scheduled to begin [facility] outpatient at [address] on Monday 6/7 with intake scheduled for 10:00 am. AVS updated with information.

## 2021-06-01 NOTE — PLAN OF CARE
Behavioral  Pt appeared sleeping comfortably overnight; breathing was even and unlabored.      Medical  Pt continues in alcohol withdrawal; MSSA 5 & 3; no valium given this shift; pt has required 140 mg of valium since admission to detox; Pt last valium 2049    No new medical concerns noted. .

## 2021-06-01 NOTE — PLAN OF CARE
Pt attended mental health education group. Discussed the Autobiography in Five Short Chapters; pt identified how this relates to their own journey of relapse and recovery. Discussed the stages of change and the upward spiral of learning within relapses. Pt identified ways that they have learned from their relapse and how that strengthens future sobriety.

## 2021-06-02 VITALS
SYSTOLIC BLOOD PRESSURE: 136 MMHG | HEART RATE: 96 BPM | RESPIRATION RATE: 16 BRPM | DIASTOLIC BLOOD PRESSURE: 91 MMHG | TEMPERATURE: 98.2 F | OXYGEN SATURATION: 99 %

## 2021-06-02 PROCEDURE — 99239 HOSP IP/OBS DSCHRG MGMT >30: CPT | Performed by: PSYCHIATRY & NEUROLOGY

## 2021-06-02 PROCEDURE — 250N000013 HC RX MED GY IP 250 OP 250 PS 637: Performed by: CLINICAL NURSE SPECIALIST

## 2021-06-02 RX ORDER — HYDROXYZINE HYDROCHLORIDE 25 MG/1
25 TABLET, FILM COATED ORAL EVERY 4 HOURS PRN
Qty: 30 TABLET | Refills: 0 | Status: ON HOLD | OUTPATIENT
Start: 2021-06-02 | End: 2021-08-27

## 2021-06-02 RX ORDER — SUCRALFATE 1 G/1
1 TABLET ORAL
Qty: 120 TABLET | Refills: 0 | Status: ON HOLD | OUTPATIENT
Start: 2021-06-02 | End: 2021-08-27

## 2021-06-02 RX ORDER — MULTIPLE VITAMINS W/ MINERALS TAB 9MG-400MCG
1 TAB ORAL DAILY
Qty: 30 TABLET | Refills: 0 | Status: ON HOLD | OUTPATIENT
Start: 2021-06-02 | End: 2021-08-27

## 2021-06-02 RX ORDER — PANTOPRAZOLE SODIUM 40 MG/1
40 TABLET, DELAYED RELEASE ORAL 2 TIMES DAILY
Qty: 60 TABLET | Refills: 0 | Status: ON HOLD | OUTPATIENT
Start: 2021-06-02 | End: 2021-08-27

## 2021-06-02 RX ORDER — FOLIC ACID 1 MG/1
1 TABLET ORAL DAILY
Qty: 30 TABLET | Refills: 0 | Status: ON HOLD | OUTPATIENT
Start: 2021-06-03 | End: 2021-08-27

## 2021-06-02 RX ORDER — PROPRANOLOL HYDROCHLORIDE 10 MG/1
10-20 TABLET ORAL 3 TIMES DAILY PRN
Status: ON HOLD | COMMUNITY
Start: 2021-06-02 | End: 2021-08-27

## 2021-06-02 RX ORDER — TRAZODONE HYDROCHLORIDE 50 MG/1
50 TABLET, FILM COATED ORAL
Qty: 30 TABLET | Refills: 0 | Status: ON HOLD | OUTPATIENT
Start: 2021-06-02 | End: 2021-08-27

## 2021-06-02 RX ORDER — LANOLIN ALCOHOL/MO/W.PET/CERES
100 CREAM (GRAM) TOPICAL DAILY
Qty: 30 TABLET | Refills: 0 | Status: ON HOLD | OUTPATIENT
Start: 2021-06-03 | End: 2021-08-27

## 2021-06-02 RX ADMIN — FOLIC ACID 1 MG: 1 TABLET ORAL at 08:20

## 2021-06-02 RX ADMIN — PANTOPRAZOLE SODIUM 40 MG: 40 TABLET, DELAYED RELEASE ORAL at 08:23

## 2021-06-02 RX ADMIN — SUCRALFATE 1 G: 1 TABLET ORAL at 08:23

## 2021-06-02 RX ADMIN — SUCRALFATE 1 G: 1 TABLET ORAL at 11:46

## 2021-06-02 RX ADMIN — MULTIPLE VITAMINS W/ MINERALS TAB 1 TABLET: TAB at 08:20

## 2021-06-02 RX ADMIN — THIAMINE HCL TAB 100 MG 100 MG: 100 TAB at 08:20

## 2021-06-02 NOTE — DISCHARGE INSTRUCTIONS
Behavioral Discharge Planning and Instructions  THANK YOU FOR CHOOSING [facility]  3A  [phone]    Summary: You were admitted to Station 3A on 5/29/21 for detoxification from alcohol.  A medical exam was performed that included lab work. You have met with a  and opted to attend IOP and sober housing at [facility].  Please take care and make your recovery a daily priority, Paulo!  It was a pleasure working with you and the entire treatment team here wishes you the very best in your recovery!     Recommendation:  IOP and sober housing    Main Diagnoses:  Per Dr. Dr. Yasir Gonzales MD  303.90 (F10.20) Alcohol Use Disorder Severe    Major Treatments, Procedures and Findings:  You were treated for alcohol detoxification using [facility] protocol.  You had a chemical dependency assessment. You had labs drawn and those results were reviewed with you. Please take a copy of your lab work with you to your next primary care provider appointment.    Symptoms to Report:  If you experience more anxiety, confusion, sleeplessness, deep sadness or thoughts of suicide, notify your treatment team or notify your primary care provider. IF ANY OF THE SYMPTOMS YOU ARE EXPERIENCING ARE A MEDICAL EMERGENCY CALL 911 IMMEDIATELY.     Lifestyle Adjustment: Adjust your lifestyle to get enough sleep, relaxation, exercise and good nutrition. Continue to develop healthy coping skills to decrease stress and promote a sober living environment. Do not use mood altering substances including alcohol, illegal drugs or addictive medications other than what is currently prescribed.     Disposition: [facility]    Facts about COVID19 at www.cdc.gov/COVID19 and www.[place].gov/covid19    Keeping hands clean is one of the most important steps we can take to avoid getting sick and spreading germs to others.  Please wash your hands frequently and lather with soap for at least 20 seconds!    Medical Follow-Up:  ***    Treatment  Follow-Up:  [facility]  Admission: Monday June 7th at 10:00 am  [address] [place] [address]  [phone]    Recovery apps for your phone to locate current in person and zoom recovery meetings  [place] - meeting bernardino  AA  - meeting bernardino  Meeting guide - meeting bernardino  Quick NA meeting - meeting bernardino  Jolie- has various apps    Resources:  *due to covid-19 most AA/NA meetings are being held online*  AA meetings online search for them at: https://aa-intergroup.org (worldwide meeting listings)  AA meetings for [place] area can be found online at: https://a[place].org (click local online meetings listings)  NA meetings for [place] area can be found online at: https://www.nam[place].org  (click find a meeting)  AA and NA Sponsors are excellent resources for support and you can find one at any support group meeting.   Alcoholics Anonymous (https://aa.org/): for information 24 hours/day  AA Intergroup service office in [place] (http://www.aastpaul.org/) 837.572.4136  AA Intergroup service office in [facility]: [phone]. (http://www.aaminRentWikiis.org/)  Narcotics Anonymous (www.nam[place].org) (671) 977-7732  https://aafairviewriverside.org/meetings  SMART Recovery - self management for addiction recovery:  www.smartrecovery.org  Pathways ~ A Health Crisis Resource & Support Center:  708.989.1458.  https://prescribetoprevent.org/patient-education/videos/  http://www.harmreduction.org  [facility] 712-706-6794  Support Group:  AA/NA and Sponsor/support.  National [place] on Mental Illness (www.[place].trevor.org): 349.503.7254 or 794-806-3431.  Alcoholics Anonymous (www.alcoholics-anonymous.org): Check your phone book for your local chapter.  Suicide Awareness Voices of Education (SAVE) (www.save.org): 690-110-XVRO (7158)  National Suicide Prevention Line (www.mentalhealthmn.org): 277-013-EVPQ (9562)  Mental Health Consumer/Survivor Network of [place] (www.mhcsn.net): 396.134.7794 or  093-032-4489  Mental Health Association of [place] (www.mentalhealth.org): 236.534.6626 or 966-812-5673   Substance Abuse and Mental Health Services (www.samhsa.gov)  [place] Opioid Prevention Coalition: www.opioidcoalition.org    [place] Recovery Connection (MRC)  [facility] connects people seeking recovery to resources that help foster and sustain long-term recovery.  Whether you are seeking resources for treatment, transportation, housing, job training, education, health care or other pathways to recovery, [facility] is a great place to start.  909.723.1565.  www.minnesotarecGecko Biomedicaly.Mendeley    Great Pod casts for nutrition and wellness  Listen on Apple Podcasts  Dishing Up Nutrition   [employer]   Nutrition       Understand the connection between what you eat and how you feel. Hosted by licensed nutritionists and dietitians from [employer] we share practical, real-life solutions for healthier living through nutrition.     General Medication Instructions:   See your medication sheet(s) for instructions.   Take all medications as prescribed.  Make no changes unless your primary care provider suggests them.   Go to all your primary care provider visits.  Be sure to have all your required lab tests. This way, your medicines can be refilled on time.  Do not use any forms of alcohol.    Please Note:  If you have any questions at anytime after you are discharged please call [facility] detox unit 3AW at [phone].  [facility] Jennifer, Behavioral Intake [phone]  Medical Records call [phone]  Outpatient Behavioral Intake call [phone]  LP+ Wait List/Bed Availability call [phone]    Please remember to take all of your behavioral discharge planning and lab paperwork to any follow up appointments, it contains your lab results, diagnosis, medication list and discharge recommendations.      THANK YOU FOR CHOOSING [facility]

## 2021-06-02 NOTE — PLAN OF CARE
"  Problem: Behavioral Health Plan of Care  Goal: Plan of Care Review  Outcome: No Change    Pt observed out in the milieu, social to peers and staff. Presents with flat affect. Appears untidy and disheveled. Pt claimed he is \"better\" today than the time he was admitted. At 1630H, complained of having a headache. Rated pain as 4/10. PRN Tylenol given per request. Reassessed after an hour, pt denies pain and claimed the medication was effective. Pt denies SI/SIB/HI. Denies experiencing any type of hallucinations. Verbalized \"No. I am not having hallucinations or seeing dark spots right now\". Pt reports to having moderate anxiety, rated it as 7/10. Denies having depression. Pt also reports he is not sleeping good at night but claimed he took a nap today. Denies having depression.   Pt claimed he is eating good and hydrating well. Took approximately 720cc of fluids.   MSSA: 5 and 5. No Valium given this shift. Pt has not required any Valium for alcohol detox for 24 hours, last given on 5/31 at 2049H. Pt now removed from alcohol detox monitoring status.   Due medications given. Denies experiencing side effects. PRN Hydroxyzine given per request for anxiety and PRN Trazodone given per request to aide with sleep.   Needs attended to. Staff will continue to monitor. No further concerns noted as of this time.   "

## 2021-06-02 NOTE — DISCHARGE SUMMARY
Paulo Franco MRN# [id number]   Age: 45 year old YOB: 1975     Date of Admission:  5/29/2021  Date of Discharge:  6/2/2021  Admitting Physician:  Yasir Gonzales MD  Discharge Physician:  Yasir Gonzales MD      DISCHARGE  DX    Alcohol use dx severe           Event Leading to Hospitalization:     See Admission note by admitting provider for patient encounter. for additional details.          Hospital Course:   PATIENT was admitted to [facility] attending  under DR gonzales, please review the detailed admit note on 5/31/21StCarlyle escalera MD   The patient was placed under status 15 (15 minute checks) to ensure patient safety.   MSSA protocol was initiated due to the patient's history of alcohol abuse and concern for withdrawal symptoms.  CBC, BMP and utox obtained.    All outpatient medications were continued    PATIENTdid participate in groups and was visible in the milieu.     The patient's symptoms of alcohol withdrawal  improved.     Patients energy motivation , sleep appetite improved.  Pt completed detox . It was un eventful.      Discussed with patient medications for craving.  Spoke with patient about triggers coping skills relapse prevention.    CONSULTS DONE DURING PATIENTS HOSPITALIZATION.  Patient was seen by medicine on date5/30/21    This as per their medical consult         # Alcohol withdrawal, hx of alcohol use disorder - MSSA 10 this shift.  Reports hx of withdrawal seizures several years ago.  Drinking about 1-1.5L vodka daily.  He reports some hallucinations and vomiting, as well as feeling nauseated and dizzy.  Not currently on AEDs.    - Continue MSSA with valium  - Folvite, multi-vites, thiamine supplementation  - Offer Hydroxyzine for anxiety PRN    - 1L NS bolus now   - Further management per Psychiatry      # Borderline hypomagnesemia - Mag 1.7.   - Mag Ox replacement 400mg daily x 2 doses      # Esophagitis   # ?Hematemesis   Underwent EGD in 2/2020.   Review of ED note from [facility] mentions complaints of abdominal pain and hematemesis, but not visualized since arrival to .  No known hx varices. Hgb stable at 15.3.    - Continue PTA Carafate and   - Continue Protonix 40mg BID      # Elevated LFTs - , AST 75.  2:1 hepatocellular injury pattern c/w alcohol use.    - Repeat CMP in 1-2 days       Pt was seen by cm  As per recommendations from cm    Pt reports he spoke with his sober house, [facility], and they are letting him return to their sober house. Pt reports he can return there after discharge from hospital. Pt is scheduled to begin [facility] outpatient at [address] location on Monday 6/7 with intake scheduled for 10:00 am. AVS updated with information.         Labs:reviewed with patient     No results found for this or any previous visit (from the past 48 hour(s)).      Recent Results (from the past 240 hour(s))   CBC with platelets differential    Collection Time: 05/29/21  1:15 PM   Result Value Ref Range    WBC 5.5 4.0 - 11.0 10e9/L    RBC Count 4.80 4.4 - 5.9 10e12/L    Hemoglobin 15.3 13.3 - 17.7 g/dL    Hematocrit 44.7 40.0 - 53.0 %    MCV 93 78 - 100 fl    MCH 31.9 26.5 - 33.0 pg    MCHC 34.2 31.5 - 36.5 g/dL    RDW 14.2 10.0 - 15.0 %    Platelet Count 354 150 - 450 10e9/L    Diff Method Automated Method     % Neutrophils 55.0 %    % Lymphocytes 35.3 %    % Monocytes 7.4 %    % Eosinophils 1.4 %    % Basophils 0.7 %    % Immature Granulocytes 0.2 %    Nucleated RBCs 0 0 /100    Absolute Neutrophil 3.0 1.6 - 8.3 10e9/L    Absolute Lymphocytes 2.0 0.8 - 5.3 10e9/L    Absolute Monocytes 0.4 0.0 - 1.3 10e9/L    Absolute Eosinophils 0.1 0.0 - 0.7 10e9/L    Absolute Basophils 0.0 0.0 - 0.2 10e9/L    Abs Immature Granulocytes 0.0 0 - 0.4 10e9/L    Absolute Nucleated RBC 0.0    Comprehensive metabolic panel    Collection Time: 05/29/21  1:15 PM   Result Value Ref Range    Sodium 142 133 - 144 mmol/L    Potassium 3.7 3.4 - 5.3 mmol/L    Chloride 107 94 - 109  mmol/L    Carbon Dioxide 25 20 - 32 mmol/L    Anion Gap 10 3 - 14 mmol/L    Glucose 115 (H) 70 - 99 mg/dL    Urea Nitrogen 6 (L) 7 - 30 mg/dL    Creatinine 0.70 0.66 - 1.25 mg/dL    GFR Estimate >90 >60 mL/min/[1.73_m2]    GFR Estimate If Black >90 >60 mL/min/[1.73_m2]    Calcium 8.6 8.5 - 10.1 mg/dL    Bilirubin Total 0.4 0.2 - 1.3 mg/dL    Albumin 4.0 3.4 - 5.0 g/dL    Protein Total 8.6 6.8 - 8.8 g/dL    Alkaline Phosphatase 105 40 - 150 U/L     (H) 0 - 70 U/L    AST 75 (H) 0 - 45 U/L   Alcohol ethyl    Collection Time: 05/29/21  1:15 PM   Result Value Ref Range    Ethanol g/dL 0.36 (HH) <0.01 g/dL   Asymptomatic SARS-CoV-2 COVID-19 Virus (Coronavirus) by PCR    Collection Time: 05/29/21  1:15 PM    Specimen: Nasopharyngeal   Result Value Ref Range    SARS-CoV-2 Virus Specimen Source Nasopharyngeal     SARS-CoV-2 PCR Result NEGATIVE     SARS-CoV-2 PCR Comment (Note)    Lipase    Collection Time: 05/29/21  1:15 PM   Result Value Ref Range    Lipase 224 73 - 393 U/L   Drug abuse screen urine    Collection Time: 05/29/21  5:55 PM   Result Value Ref Range    Amphetamine Qual Urine Negative NEG^Negative    Barbiturates Qual Urine Negative NEG^Negative    Benzodiazepine Qual Urine Positive (A) NEG^Negative    Cannabinoids Qual Urine Negative NEG^Negative    Cocaine Qual Urine Negative NEG^Negative    Opiates Qualitative Urine Negative NEG^Negative    PCP Qual Urine Negative NEG^Negative   Alcohol breath test POCT    Collection Time: 05/29/21  6:22 PM   Result Value Ref Range    Alcohol Breath Test 0.177 (A) 0.00 - 0.01   GGT    Collection Time: 05/30/21  8:18 AM   Result Value Ref Range    GGT 72 0 - 75 U/L   Lipid panel    Collection Time: 05/30/21  8:18 AM   Result Value Ref Range    Cholesterol 309 (H) <200 mg/dL    Triglycerides 59 <150 mg/dL    HDL Cholesterol 72 >39 mg/dL    LDL Cholesterol Calculated 225 (H) <100 mg/dL    Non HDL Cholesterol 237 (H) <130 mg/dL   TSH with free T4 reflex and/or T3 as  indicated    Collection Time: 05/30/21  8:18 AM   Result Value Ref Range    TSH 1.04 0.40 - 4.00 mU/L   Magnesium    Collection Time: 05/30/21  8:18 AM   Result Value Ref Range    Magnesium 1.7 1.6 - 2.3 mg/dL   Comprehensive metabolic panel    Collection Time: 05/31/21  7:53 AM   Result Value Ref Range    Sodium 140 133 - 144 mmol/L    Potassium 3.7 3.4 - 5.3 mmol/L    Chloride 104 94 - 109 mmol/L    Carbon Dioxide 28 20 - 32 mmol/L    Anion Gap 8 3 - 14 mmol/L    Glucose 90 70 - 99 mg/dL    Urea Nitrogen 5 (L) 7 - 30 mg/dL    Creatinine 0.74 0.66 - 1.25 mg/dL    GFR Estimate >90 >60 mL/min/[1.73_m2]    GFR Estimate If Black >90 >60 mL/min/[1.73_m2]    Calcium 8.9 8.5 - 10.1 mg/dL    Bilirubin Total 0.9 0.2 - 1.3 mg/dL    Albumin 3.6 3.4 - 5.0 g/dL    Protein Total 7.5 6.8 - 8.8 g/dL    Alkaline Phosphatase 87 40 - 150 U/L    ALT 79 (H) 0 - 70 U/L    AST 49 (H) 0 - 45 U/L            Because this patient meets criteria for an Alcohol Use Disorder, I performed the following brief intervention on the date of this note:              1) Expressed concern that the patient is drinking at unhealthy levels known to increase their risk of alcohol related problems              2) Gave feedback linking alcohol use and health, including personalized feedback explaining how alcohol use can interact with their medical and/or psychiatric problems, and with prescribed medications.              3) Advised patient to abstain.        Discussed with patient many issues of addiction,triggers, relapse, and establishing a solid recovery program.    DISCHARGE MENTAL STATUS EXAMINATION:  The patient is alert, oriented x3.  Good fund of knowledge.  Good use of language.  Recent and remote memory, language, fund of knowledge are all adequate.  Euthymic mood congruent affect  Speech normal rate/rhythm linear tp no loose asso,The patient does not have any active suicidal or homicidal ideation.  Does not have any auditory or visual  hallucination.  Fair insight/judgment At this time, the patient was stable to be discharged.        Pt was not determined to not be a danger to himself or others. At the current time of discharge, the patient does not meet criteria for involuntary hospitalization. On the day of discharge, the patient reports that they do not have suicidal or homicidal ideation and would never hurt themselves or others. Steps taken to minimize risk include: assessing patient s behavior and thought process daily during hospital stay, discharging patient with adequate plan for follow up for mental and physical health and discussing safety plan of returning to the hospital should the patient ever have thoughts of harming themselves or others. Therefore, based on all available evidence including the factors cited above, the patient does not appear to be at imminent risk for self-harm, and is appropriate for outpatient level of care.     Educated about side effects/risk vs benefits /alternative including non treatment.Pt consented to be on medication.     .Total time spent on discharge summary more than 35 min  More than  20 min  planning, coordination of care, medication reconciliation and performance of physical exam on day of discharge.Care was coordinated with unit RN and unit therapist       Paulo Franco   Home Medication Instructions SHAGUFTA:[id number]    Printed on:06/02/21 0856   Medication Information                      folic acid (FOLVITE) 1 MG tablet  Take 1 tablet (1 mg) by mouth daily             hydrOXYzine (ATARAX) 25 MG tablet  Take 1 tablet (25 mg) by mouth every 4 hours as needed for anxiety             multivitamin w/minerals (THERA-VIT-M) tablet  Take 1 tablet by mouth daily             pantoprazole (PROTONIX) 40 MG EC tablet  Take 1 tablet (40 mg) by mouth 2 times daily             propranolol (INDERAL) 10 MG tablet  Take 1-2 tablets (10-20 mg) by mouth 3 times daily as needed (anxiety)             sucralfate  "(CARAFATE) 1 GM tablet  Take 1 tablet (1 g) by mouth 4 times daily (before meals and nightly)             thiamine (B-1) 100 MG tablet  Take 1 tablet (100 mg) by mouth daily             traZODone (DESYREL) 50 MG tablet  Take 1 tablet (50 mg) by mouth nightly as needed for sleep (may repeat after 60 minutes)                 Disposition: sober home      Facts about COVID19 at www.cdc.gov/COVID19 and www.[place].gov/covid19     Keeping hands clean is one of the most important steps we can take to avoid getting sick and spreading germs to others.  Please wash your hands frequently and lather with soap for at least 20 seconds!     Medical Follow-Up:pt was asked to make a f/u with pcp in 2 weeks        Treatment Follow-Up:Treatment Follow-Up:  [facility]  Admission: Monday June 7th at 10:00 am  [address] [place] [address]  [phone]     .        \"Much or all of the text in this note was generated through the use of Dragon Dictate voice to text software. Errors in spelling or words which appear to be out of contact are unintentional, may be present due having escaped editing\"     "

## 2021-06-02 NOTE — DISCHARGE SUMMARY
"Pt was calm and cooperative this morning. Pt took his medications and waited patiently for his medications to arrive so he could be discharged. Pt states they are ready to be discharged and that they look forward to getting outside. Writer asked pt if he had any recent thoughts of SI and he said \"no.\" Pt also denies SIB, AH, VH, and HI. Pt denies any medical issues at this time. Prior to discharge writer went over AVS in regards to his medications, follow up appointments, and resource numbers. Pt signed his consents and was walked down by staff.   "

## 2021-08-23 ENCOUNTER — HOSPITAL ENCOUNTER (INPATIENT)
Facility: CLINIC | Age: 46
LOS: 3 days | Discharge: NURSING, REHAB OR LONG-TERM CARE | End: 2021-08-27
Attending: EMERGENCY MEDICINE | Admitting: PSYCHIATRY & NEUROLOGY
Payer: COMMERCIAL

## 2021-08-23 DIAGNOSIS — F10.930 ALCOHOL WITHDRAWAL SYNDROME WITHOUT COMPLICATION (H): ICD-10-CM

## 2021-08-23 DIAGNOSIS — F10.220 ACUTE ALCOHOLIC INTOXICATION IN ALCOHOLISM WITHOUT COMPLICATION (H): ICD-10-CM

## 2021-08-23 DIAGNOSIS — Z20.822 COVID-19 RULED OUT BY LABORATORY TESTING: ICD-10-CM

## 2021-08-23 LAB — ALCOHOL BREATH TEST: 0.29 (ref 0–0.01)

## 2021-08-23 PROCEDURE — 87635 SARS-COV-2 COVID-19 AMP PRB: CPT | Performed by: EMERGENCY MEDICINE

## 2021-08-23 PROCEDURE — 82075 ASSAY OF BREATH ETHANOL: CPT | Performed by: EMERGENCY MEDICINE

## 2021-08-23 PROCEDURE — 99284 EMERGENCY DEPT VISIT MOD MDM: CPT | Performed by: EMERGENCY MEDICINE

## 2021-08-23 PROCEDURE — 99285 EMERGENCY DEPT VISIT HI MDM: CPT | Mod: 25 | Performed by: EMERGENCY MEDICINE

## 2021-08-23 PROCEDURE — C9803 HOPD COVID-19 SPEC COLLECT: HCPCS | Performed by: EMERGENCY MEDICINE

## 2021-08-24 ENCOUNTER — TELEPHONE (OUTPATIENT)
Dept: BEHAVIORAL HEALTH | Facility: CLINIC | Age: 46
End: 2021-08-24

## 2021-08-24 PROBLEM — F10.220 ACUTE ALCOHOLIC INTOXICATION IN ALCOHOLISM WITHOUT COMPLICATION (H): Status: ACTIVE | Noted: 2021-08-24

## 2021-08-24 LAB
ALBUMIN SERPL-MCNC: 3.8 G/DL (ref 3.4–5)
ALP SERPL-CCNC: 85 U/L (ref 40–150)
ALT SERPL W P-5'-P-CCNC: 32 U/L (ref 0–70)
AMPHETAMINES UR QL SCN: NORMAL
ANION GAP SERPL CALCULATED.3IONS-SCNC: 12 MMOL/L (ref 3–14)
AST SERPL W P-5'-P-CCNC: 36 U/L (ref 0–45)
BARBITURATES UR QL: NORMAL
BASOPHILS # BLD AUTO: 0 10E3/UL (ref 0–0.2)
BASOPHILS NFR BLD AUTO: 0 %
BENZODIAZ UR QL: NORMAL
BILIRUB SERPL-MCNC: 0.8 MG/DL (ref 0.2–1.3)
BUN SERPL-MCNC: 18 MG/DL (ref 7–30)
CALCIUM SERPL-MCNC: 8.5 MG/DL (ref 8.5–10.1)
CANNABINOIDS UR QL SCN: NORMAL
CHLORIDE BLD-SCNC: 97 MMOL/L (ref 94–109)
CO2 SERPL-SCNC: 24 MMOL/L (ref 20–32)
COCAINE UR QL: NORMAL
CREAT SERPL-MCNC: 0.67 MG/DL (ref 0.66–1.25)
EOSINOPHIL # BLD AUTO: 0 10E3/UL (ref 0–0.7)
EOSINOPHIL NFR BLD AUTO: 0 %
ERYTHROCYTE [DISTWIDTH] IN BLOOD BY AUTOMATED COUNT: 12.2 % (ref 10–15)
GFR SERPL CREATININE-BSD FRML MDRD: >90 ML/MIN/1.73M2
GLUCOSE BLD-MCNC: 86 MG/DL (ref 70–99)
HCT VFR BLD AUTO: 45 % (ref 40–53)
HGB BLD-MCNC: 16.1 G/DL (ref 13.3–17.7)
IMM GRANULOCYTES # BLD: 0 10E3/UL
IMM GRANULOCYTES NFR BLD: 0 %
LYMPHOCYTES # BLD AUTO: 2.4 10E3/UL (ref 0.8–5.3)
LYMPHOCYTES NFR BLD AUTO: 25 %
MCH RBC QN AUTO: 32.7 PG (ref 26.5–33)
MCHC RBC AUTO-ENTMCNC: 35.8 G/DL (ref 31.5–36.5)
MCV RBC AUTO: 92 FL (ref 78–100)
MONOCYTES # BLD AUTO: 0.7 10E3/UL (ref 0–1.3)
MONOCYTES NFR BLD AUTO: 8 %
NEUTROPHILS # BLD AUTO: 6.4 10E3/UL (ref 1.6–8.3)
NEUTROPHILS NFR BLD AUTO: 67 %
NRBC # BLD AUTO: 0 10E3/UL
NRBC BLD AUTO-RTO: 0 /100
OPIATES UR QL SCN: NORMAL
PLATELET # BLD AUTO: 291 10E3/UL (ref 150–450)
POTASSIUM BLD-SCNC: 4.1 MMOL/L (ref 3.4–5.3)
PROT SERPL-MCNC: 8.6 G/DL (ref 6.8–8.8)
RBC # BLD AUTO: 4.92 10E6/UL (ref 4.4–5.9)
SARS-COV-2 RNA RESP QL NAA+PROBE: NEGATIVE
SODIUM SERPL-SCNC: 133 MMOL/L (ref 133–144)
WBC # BLD AUTO: 9.6 10E3/UL (ref 4–11)

## 2021-08-24 PROCEDURE — 250N000011 HC RX IP 250 OP 636: Performed by: PSYCHIATRY & NEUROLOGY

## 2021-08-24 PROCEDURE — HZ2ZZZZ DETOXIFICATION SERVICES FOR SUBSTANCE ABUSE TREATMENT: ICD-10-PCS | Performed by: PSYCHIATRY & NEUROLOGY

## 2021-08-24 PROCEDURE — 250N000013 HC RX MED GY IP 250 OP 250 PS 637: Performed by: EMERGENCY MEDICINE

## 2021-08-24 PROCEDURE — 90791 PSYCH DIAGNOSTIC EVALUATION: CPT

## 2021-08-24 PROCEDURE — 128N000004 HC R&B CD ADULT

## 2021-08-24 PROCEDURE — 80307 DRUG TEST PRSMV CHEM ANLYZR: CPT | Performed by: FAMILY MEDICINE

## 2021-08-24 PROCEDURE — 80053 COMPREHEN METABOLIC PANEL: CPT | Performed by: FAMILY MEDICINE

## 2021-08-24 PROCEDURE — 36415 COLL VENOUS BLD VENIPUNCTURE: CPT | Performed by: FAMILY MEDICINE

## 2021-08-24 PROCEDURE — 99223 1ST HOSP IP/OBS HIGH 75: CPT | Mod: AI | Performed by: PSYCHIATRY & NEUROLOGY

## 2021-08-24 PROCEDURE — 250N000011 HC RX IP 250 OP 636: Performed by: EMERGENCY MEDICINE

## 2021-08-24 PROCEDURE — 85004 AUTOMATED DIFF WBC COUNT: CPT | Performed by: FAMILY MEDICINE

## 2021-08-24 PROCEDURE — H2032 ACTIVITY THERAPY, PER 15 MIN: HCPCS

## 2021-08-24 PROCEDURE — 250N000013 HC RX MED GY IP 250 OP 250 PS 637: Performed by: PSYCHIATRY & NEUROLOGY

## 2021-08-24 RX ORDER — LANOLIN ALCOHOL/MO/W.PET/CERES
100 CREAM (GRAM) TOPICAL DAILY
Status: DISCONTINUED | OUTPATIENT
Start: 2021-08-24 | End: 2021-08-27 | Stop reason: HOSPADM

## 2021-08-24 RX ORDER — FOLIC ACID 1 MG/1
1 TABLET ORAL DAILY
Status: DISCONTINUED | OUTPATIENT
Start: 2021-08-24 | End: 2021-08-27 | Stop reason: HOSPADM

## 2021-08-24 RX ORDER — ONDANSETRON 4 MG/1
4 TABLET, ORALLY DISINTEGRATING ORAL ONCE
Status: COMPLETED | OUTPATIENT
Start: 2021-08-24 | End: 2021-08-24

## 2021-08-24 RX ORDER — ONDANSETRON 4 MG/1
4 TABLET, FILM COATED ORAL EVERY 6 HOURS PRN
Status: DISCONTINUED | OUTPATIENT
Start: 2021-08-24 | End: 2021-08-27 | Stop reason: HOSPADM

## 2021-08-24 RX ORDER — DIAZEPAM 5 MG
5-20 TABLET ORAL EVERY 30 MIN PRN
Status: DISCONTINUED | OUTPATIENT
Start: 2021-08-24 | End: 2021-08-24

## 2021-08-24 RX ORDER — ATENOLOL 50 MG/1
50 TABLET ORAL DAILY PRN
Status: DISCONTINUED | OUTPATIENT
Start: 2021-08-24 | End: 2021-08-27 | Stop reason: HOSPADM

## 2021-08-24 RX ORDER — MULTIPLE VITAMINS W/ MINERALS TAB 9MG-400MCG
1 TAB ORAL DAILY
Status: DISCONTINUED | OUTPATIENT
Start: 2021-08-24 | End: 2021-08-27 | Stop reason: HOSPADM

## 2021-08-24 RX ORDER — DIAZEPAM 5 MG
5-20 TABLET ORAL EVERY 30 MIN PRN
Status: DISCONTINUED | OUTPATIENT
Start: 2021-08-24 | End: 2021-08-27 | Stop reason: HOSPADM

## 2021-08-24 RX ADMIN — DIAZEPAM 10 MG: 5 TABLET ORAL at 06:03

## 2021-08-24 RX ADMIN — DIAZEPAM 10 MG: 5 TABLET ORAL at 16:10

## 2021-08-24 RX ADMIN — ATENOLOL 50 MG: 50 TABLET ORAL at 16:10

## 2021-08-24 RX ADMIN — ONDANSETRON HYDROCHLORIDE 4 MG: 4 TABLET, FILM COATED ORAL at 13:08

## 2021-08-24 RX ADMIN — FOLIC ACID 1 MG: 1 TABLET ORAL at 13:01

## 2021-08-24 RX ADMIN — DIAZEPAM 10 MG: 5 TABLET ORAL at 08:13

## 2021-08-24 RX ADMIN — DIAZEPAM 10 MG: 5 TABLET ORAL at 10:32

## 2021-08-24 RX ADMIN — DIAZEPAM 5 MG: 5 TABLET ORAL at 04:03

## 2021-08-24 RX ADMIN — ONDANSETRON 4 MG: 4 TABLET, ORALLY DISINTEGRATING ORAL at 02:25

## 2021-08-24 RX ADMIN — DIAZEPAM 10 MG: 5 TABLET ORAL at 18:33

## 2021-08-24 RX ADMIN — THIAMINE HCL TAB 100 MG 100 MG: 100 TAB at 13:01

## 2021-08-24 RX ADMIN — DIAZEPAM 10 MG: 5 TABLET ORAL at 14:53

## 2021-08-24 RX ADMIN — DIAZEPAM 10 MG: 5 TABLET ORAL at 13:01

## 2021-08-24 RX ADMIN — MULTIPLE VITAMINS W/ MINERALS TAB 1 TABLET: TAB at 13:01

## 2021-08-24 ASSESSMENT — ACTIVITIES OF DAILY LIVING (ADL)
DRESS: INDEPENDENT;SCRUBS (BEHAVIORAL HEALTH)
LAUNDRY: UNABLE TO COMPLETE
HYGIENE/GROOMING: INDEPENDENT
ORAL_HYGIENE: INDEPENDENT

## 2021-08-24 ASSESSMENT — MIFFLIN-ST. JEOR: SCORE: 1630.61

## 2021-08-24 NOTE — PROGRESS NOTES
08/24/21 1305   Patient Belongings   Patient Belongings other (see comments)   Patient Belongings Remaining with Patient other (see comments)   Belongings Search Yes   Clothing Search Yes   Second Staff Jr Thorpe   Comment Items placed in storage and sent to security   Med Room: pants w/ string, cell phone, wallet  Security #[id number]: 1 [place] DL, 2 credit/debit cards, 1 [place] EBT, 1 insurance card                                                        $131.00 CASH    A               Admission:  I am responsible for any personal items that are not sent to the safe or pharmacy.  [facility] is not responsible for loss, theft or damage of any property in my possession.    Signature:  _________________________________ Date: _______  Time: _____                                              Staff Signature:  ____________________________ Date: ________  Time: _____      2nd Staff person, if patient is unable/unwilling to sign:    Signature: ________________________________ Date: ________  Time: _____     Discharge:  [facility] has returned all of my personal belongings:    Signature: _________________________________ Date: ________  Time: _____                                          Staff Signature:  ____________________________ Date: ________  Time: _____

## 2021-08-24 NOTE — ED NOTES
"RN entered room and informed pt discharge paperwork to be given. Pt states that he doesn't want to leave.  Pt then informs RN that he is suicidal, and has had fleeting SI thoughts without a plan, then states he wants to be dead. Pt then goes on to say, \"will I do it? No, I don't have what it takes, like the gumption.\" pt states he believes he is feeling this way secondary to his relapse. MD notified.  "

## 2021-08-24 NOTE — ED NOTES
Bed: HW02  Expected date: 8/23/21  Expected time: 7:01 PM  Means of arrival: Ambulance  Comments:  [facility] [phone] male etoh, altered mentation

## 2021-08-24 NOTE — ED TRIAGE NOTES
Patient reports relapsing on alcohol a few days ago. Patient reports drinking 1-2 liters of vodka today. Patient report having a history of seizures during withdrawals.

## 2021-08-24 NOTE — PLAN OF CARE
"Situation: Patient admitted to unit 3A from ED for alcohol detox at about 1200.    Background: Patient is a 45-year-old male with history of alcohol abuse.  He first started drinking alcohol at age 13 and endorses it has been a problem for him since about age 26 or 27.  Patient says he has had about 13-14 CD admissions.  Denies  admission.  Denies history of suicide attempt.  Denies nicotine use and denies use of illegal drugs.  Denies history of abuse.  Patient endorses seizure history when attempting to detox at home.     Patient admitted to ED after relapsing on alcohol about 1 week ago.  Patient says he has been drinking about 1 liter of alcohol per day for past week. Prior to relapse, he was sober since June, living in a sober home, and working as a .  Patient has not worked since relapse but is optimistic about getting a job after discharge.      Assessment: Patient is alert and oriented to person, place, time, and date. He endorses high anxiety, rating it at 10/10.  He says with increased anxiety he can feel his heart racing.  Pulse is elevated at 117 at admission.  Patient explained when he first came to the hospital he felt passive SI due to his situation and letting people in his life down.  He currently denies SI, HI, SIB, and depression.  Patient endorses auditory and visual hallucinations.  He describes auditory hallucinations as \"muffled voices.\"  He describes visual hallucinations as seeing \"tracers on the wall\" but then explains they are \"indescribable.\"  Patient is calm, pleasant, and cooperative with admission process.  No aggressive behaviors are observed.  MSSA score at 1239 was 12.  Diazepam 10 mg and ondansetron 4 mg administered.     Vital Signs:  B/P: 144/91, T: 98.9, P: 117, R: 16       Recommendation: Patient has been oriented to unit.  Seizure and withdrawal precautions initiated.  Will facilitate therapeutic environment by encouraging attending groups, engaging with staff, and " taking medication per provider order. Will continue MSSA assessment per protocol.  Will monitor patient on status 15 minute checks. Dixie Salmon RN

## 2021-08-24 NOTE — ED PROVIDER NOTES
Patient seen by Dr. Andino initially.  Intoxicated was planning to be admitted to [address] but then patient stated he was suicidal.  Seen by the mental health  this morning.  At this point patient does not have any active suicidal plan etc.  He is feeling more suicidal with concerns of the withdrawal that he may be having.  There are beds now here on the [facility] will plan admit to 3 a detox did order CBC CMP drug screen also.     Ayo Colbert MD  08/24/21 8949

## 2021-08-24 NOTE — ED NOTES
Pt seen on camera sticking fingers down his throat, RN entered room and pt laying in bed hands at sides. Pt states he is feeling sick and admitted to sticking fingers in his throat to force emesis. Pt given emesis bag and sat up.

## 2021-08-24 NOTE — SAFE
"Paulo PEREZ Doak  August 24, 2021  SAFE Note    Critical Safety Issues:     Patient states he had been sober for 6 months until 1 week ago.  Patient reports he relapsed due to feeling ill from a brief cold.  Patient tested negative for Covid during this ED encounter.  Patient reports he has been drinking 1 L of vodka per day for the past 1 week.  Patient endorses history of seizures, most recently 1.5 years ago.  Patient endorses current withdrawal symptoms including difficulty concentrating, nausea, \"seeing all sorts of shit \", and hearing muffled voices.  Patient reports he is \"in full on DTs \".  Patient reports due to uncomfortable withdrawal symptoms, he has been increasingly hopeless for the past 12 hours.  Patient reports he has been \"debating taking his own life all night \".  Patient endorses \"fleeting\" suicidal ideation. Patient denies any specific intent or plan for suicide.  Patient reports he is \"too chicken shit\" to harm himself.  Patient denies any prior suicide attempts.  Patient reports he knows this feeling will pass. Patient believes he will feel better both physically and emotionally once he receives Valium for his withdrawal symptoms.  Patient reports he would not feel suicidal if admitted to inpatient detox, and reports he can keep himself safe on the unit.      Current Suicidal Ideation/Self-Injurious Concerns/Methods: Other passive SI for 12 hours secondary to alcohol withdrawal.      Current or Historical Inappropriate Sexual Behavior: Unknown      Current or Historical Aggression/Homicidal Ideation: None - N/A      Triggers: alcohol relapse 1 week ago.    Updated care team: Yes: MD and central intake are aware of inpatient detox referral.    For additional details see full [facility] assessment.       JON Reeves LICSW  "

## 2021-08-24 NOTE — H&P
Paulo Franco is a 45 year old male     History was provided by JEFFERY who was a fair historian.   CHIEF COMPLAINT:    relapse  HISTORY OF PRESENT ILLNESS:    Pt reports he was sober since his last admission here which was in June 2021.  He reports he is a  he became sick and he immediately Thought of drinking .  he reports he has been drinking 1 liter of vodka daily for 1 wk.  Yesterday in the emergency room he was having suicidal ideation.  Patient has been using the following substances:   Started at age14 , became a problem at 20's    Patient has tolerance, withdrawal, progressive use, loss of control, spending more time and more amount than intended. Patient has made attempts to quit, is experiencing cravings, and reports negative consequences.               Patient does have a history of seizures.  Patient does not have a history of delirium tremens.               Denies thoughts of suicide or harming others.      Denies auditory or visual hallucinations.     Patient smokes 0 cigarettes    Patient denied any gambling    Substance Age first use First became regular or problematic Most recent use            Cannabis  none     Cocaine NONE       Stimulants NONE       Opioids NONE       Sedatives NONE       Hallucinogens NONE       Inhalants NONE       Other         OTC drugs NONE       Nicotine         Patient does not have a history of overdose.  Patient does not have a history of IV use.  Patient does not have a history of hepatitis, HIV,  PSYCHIATRIC REVIEW OF SYSTEMS:         Psychiatric Review of Systems:   Depression:   Denied depressed mood, suicidal ideation, decreased interest, changes in sleep, changes in appetite, guilt, hopelessness, helplessness, impaired concentration, decreased energy, irritability.   Denies: depressed mood, suicidal ideation, decreased interest, changes in sleep, changes in appetite, guilt, hopelessness, helplessness, impaired concentration, decreased energy,  irritability.  Susan:   denied sleeplessness, impulsiveness, racing thoughts, increased goal-directed activities, pressured speech, increase in energy  Susan Feeling euphoric,Distractible,Impulsive,Grandiose,Talking excessively,Have energy without sleeping,Mood swings,Irritability  Denies: sleeplessness, increased goal-directed activities, abrupt increase in energy, pressured speech  Psychosis:   Reports: +visual hallucinations, +auditory hallucinations in the context of withdrawl,  Anxiety: anxious feels its 10  feels his pulse is up,head rush     Pt has following s/o of anxiety  No Shortness of breath,Rapid heart rate,no Sweaty,shakey Shortness of breath,Butterflies in the stomach,  , nausea    Denies: worries that are difficult to control for the past 6 months, panic attacks  PTSD:     Denies: re-experiencing past trauma, nightmares, increased arousal, avoidance of traumatic stimuli, impaired function.  OCD:     Denies: obsessions, checking, symmetry, cleaning, skin picking.  ED:     Denies: restriction, binging, purging.    Denied Symptoms of attention deficit disorder include a failure to pay attention to detail, a pattern of careless mistakes, a pattern of inattentive listening, a failure to follow through with projects, poor personal organization, losing necessary objects, distractibility, forgetfulness.    deniedSymptoms of borderline personality disorder include a fear of abandonment, unstable self-image, impulsive behavior, dissociative feeling, intense anger, unstable personal relationships, chronic feelings of boredom, periods of intense depressed mood.              PSYCHIATRIC HISTORY               Past court commitments: none  SIB /SUICIDE ATTEMPTS NONE  Psych Hosp :none  Outpatient Programs none  Inpatient cd trt 13   Out pt cd trt none        SOCIAL HISTORY                                                                         Lives in a sober house, unemployed          Family History:   FAMILY  HISTORY:   Family History   Problem Relation Age of Onset     Polycystic Kidney Diease Mother      Substance Abuse Mother      Depression Mother      Heart Defect Father      Heart Defect Brother      Family Mental Health History-  Mother has depression    Substance Use Problems - present for mother , mgf              PTA Medications:     Medications Prior to Admission   Medication Sig Dispense Refill Last Dose     folic acid (FOLVITE) 1 MG tablet Take 1 tablet (1 mg) by mouth daily (Patient not taking: Reported on 8/24/2021) 30 tablet 0 Not Taking     hydrOXYzine (ATARAX) 25 MG tablet Take 1 tablet (25 mg) by mouth every 4 hours as needed for anxiety (Patient not taking: Reported on 8/24/2021) 30 tablet 0 Not Taking     multivitamin w/minerals (THERA-VIT-M) tablet Take 1 tablet by mouth daily (Patient not taking: Reported on 8/24/2021) 30 tablet 0 Not Taking     pantoprazole (PROTONIX) 40 MG EC tablet Take 1 tablet (40 mg) by mouth 2 times daily (Patient not taking: Reported on 8/24/2021) 60 tablet 0 Not Taking     propranolol (INDERAL) 10 MG tablet Take 1-2 tablets (10-20 mg) by mouth 3 times daily as needed (anxiety) (Patient not taking: Reported on 8/24/2021)   Not Taking     sucralfate (CARAFATE) 1 GM tablet Take 1 tablet (1 g) by mouth 4 times daily (before meals and nightly) (Patient not taking: Reported on 8/24/2021) 120 tablet 0 Not Taking     thiamine (B-1) 100 MG tablet Take 1 tablet (100 mg) by mouth daily (Patient not taking: Reported on 8/24/2021) 30 tablet 0 Not Taking     traZODone (DESYREL) 50 MG tablet Take 1 tablet (50 mg) by mouth nightly as needed for sleep (may repeat after 60 minutes) (Patient not taking: Reported on 8/24/2021) 30 tablet 0 Not Taking          Allergies:   No Known Allergies       Labs:     Recent Results (from the past 48 hour(s))   Alcohol breath test POCT    Collection Time: 08/23/21  7:42 PM   Result Value Ref Range    Alcohol Breath Test 0.291 (A) 0.00 - 0.01    Asymptomatic COVID-19 Virus (Coronavirus) by PCR Nasopharyngeal    Collection Time: 08/23/21 11:24 PM    Specimen: Nasopharyngeal; Swab   Result Value Ref Range    SARS CoV2 PCR Negative Negative   Comprehensive metabolic panel    Collection Time: 08/24/21  7:56 AM   Result Value Ref Range    Sodium 133 133 - 144 mmol/L    Potassium 4.1 3.4 - 5.3 mmol/L    Chloride 97 94 - 109 mmol/L    Carbon Dioxide (CO2) 24 20 - 32 mmol/L    Anion Gap 12 3 - 14 mmol/L    Urea Nitrogen 18 7 - 30 mg/dL    Creatinine 0.67 0.66 - 1.25 mg/dL    Calcium 8.5 8.5 - 10.1 mg/dL    Glucose 86 70 - 99 mg/dL    Alkaline Phosphatase 85 40 - 150 U/L    AST 36 0 - 45 U/L    ALT 32 0 - 70 U/L    Protein Total 8.6 6.8 - 8.8 g/dL    Albumin 3.8 3.4 - 5.0 g/dL    Bilirubin Total 0.8 0.2 - 1.3 mg/dL    GFR Estimate >90 >60 mL/min/1.73m2   CBC with platelets and differential    Collection Time: 08/24/21  7:56 AM   Result Value Ref Range    WBC Count 9.6 4.0 - 11.0 10e3/uL    RBC Count 4.92 4.40 - 5.90 10e6/uL    Hemoglobin 16.1 13.3 - 17.7 g/dL    Hematocrit 45.0 40.0 - 53.0 %    MCV 92 78 - 100 fL    MCH 32.7 26.5 - 33.0 pg    MCHC 35.8 31.5 - 36.5 g/dL    RDW 12.2 10.0 - 15.0 %    Platelet Count 291 150 - 450 10e3/uL    % Neutrophils 67 %    % Lymphocytes 25 %    % Monocytes 8 %    % Eosinophils 0 %    % Basophils 0 %    % Immature Granulocytes 0 %    NRBCs per 100 WBC 0 <1 /100    Absolute Neutrophils 6.4 1.6 - 8.3 10e3/uL    Absolute Lymphocytes 2.4 0.8 - 5.3 10e3/uL    Absolute Monocytes 0.7 0.0 - 1.3 10e3/uL    Absolute Eosinophils 0.0 0.0 - 0.7 10e3/uL    Absolute Basophils 0.0 0.0 - 0.2 10e3/uL    Absolute Immature Granulocytes 0.0 <=0.0 10e3/uL    Absolute NRBCs 0.0 10e3/uL   Drug abuse screen 1 urine (ED)    Collection Time: 08/24/21  9:11 AM   Result Value Ref Range    Amphetamines Urine Screen Negative Screen Negative    Barbiturates Urine Screen Negative Screen Negative    Benzodiazepines Urine Screen Negative Screen Negative     Cannabinoids Urine Screen Negative Screen Negative    Cocaine Urine Screen Negative Screen Negative    Opiates Urine Screen Negative Screen Negative         BP (!) 144/91   Pulse 117   Temp 98.9  F (37.2  C) (Temporal)   Resp 16   Ht 1.829 m (6')   Wt 70.8 kg (156 lb)   SpO2 93%   BMI 21.16 kg/m    Weight is 156 lbs 0 oz  Body mass index is 21.16 kg/m .    Physical Exam:     ROS: 10 point ROS neg other than the symptoms noted above in the HPI.            Past Medical History:   PAST MEDICAL HISTORY:   Past Medical History:   Diagnosis Date     Concussion      Substance abuse (H)     Done with treatment on 7/16/2018       PAST SURGICAL HISTORY:   Past Surgical History:   Procedure Laterality Date     HAND SURGERY Left        -    -           MENTAL STATUS EXAM:      Constitutional: General appearance of patient:  Appearance:  awake, alert, appeared as age stated, adequate groomed and slightly unkempt  Attitude:  cooperative  Eye Contact:  good  Mood:   Euthymic  Affect:  congruent   Speech:  clear, coherent normal rate   Psychomotor Behavior:  no evidence of tardive dyskinesia, dystonia, or tics  Thought Process:  logical, linear and goal oriented  Associations:  no loose associations  Thought Content:  no evidence of psychotic thought and active suicidal ideation present  Denied any active suicidal /homicidation ideation plan intent   Insight:  fair  Judgment:  fair  Oriented to:  time, person, and place  Attention Span and Concentration:  intact  Recent and Remote Memory:  intact  Language:  english with appropriate syntax and vocabulary  Fund of Knowledge: appropriate  Muscle Strength and Tone: normal  Gait and Station: Normal     There are no abnormal or psychotic thoughts, no preoccupations, no overvalued ideas, no rumination, no obsessions, no compulsions, no somatic concerns, no hypochrondriasis, no ideas of reference, and no delusions.  Patient denies homicidal thoughts.   Patient denies suicidal  thoughts.  Patient appears to have good judgment and good insight.     Musculoskeletal: Patient shows no abnormalities of motor activity: there is no tremor, no tic, and no dystonia.  There is no apparent muscle atrophy, strength and tone appear normal, and there are no abnormal movements.  Patient has normal gait and stance.    DISCUSSION:         Assessment:       Patient has a biological predisposition with family history positive for alcoholism  Psychologically patient is experiencing alcohol withdrawal patient was endorsing suicidal ideation in the emergency room he is also having anxiety  Patient has these particular stressors:walked off his job  Patient has chronic illness exacerbation leading to hospitalization progression as described.     Patient has been unable to stop using drugs in the community due to both physical and psychological symptoms.  Continued use will put the patient at risk for medical and/or psychiatric complications.      Inpatient psychiatric hospitalization is warranted at this time for safety, stabilization, and possible adjustment in medications.       Diagnoses:    Alcohol use disorder severe  Alcohol withdrawal severe  Suicide ideation in the emergency room          Plan:   Problem list  1#alcohol use disorder severe alcohol withdrawal severe   Patient has a pulse of 117 blood pressure 144/91 patient has tremor sleep disturbance agitation he is having active hallucinations she sees stressors and is also hearing muffled voices in the background agitation sweats  He scored a 12 and received 20 mg of diazepam since admission is required 35 mg of diazepam  - MSSA protocol using Valium for management of alcohol withdrawal    - Continue thiamine, folate, and multivitamin daily    2#patient is having uncomplicated withdrawal with hallucinations his breathalyzer is 0.291  He ordered Zofran for his nause patient to be on seizure precautions        - Consider anti-craving medications prior to  discharge. Pt willing to review additional information about both naltrexone and Antabuse.    Alcohol withdrawal nausea prn Zofran as needed for nausea     hydroxyzine 25 mg q4h prn for acute anxiety  Trazodone 50 mg at bedtime prn for sleep disturbances       Patient has been unable to stop using drugs in the community due to both physical and psychological symptoms.  Continued use will put the patient at risk for medical and/or psychiatric complications.    I HAVE REVIEWED LABS WITH PT AND TALKED ABOUT RESULTS WITH PT  I HAVE REVIEWED AND SUMMARIZED OLD RECORDS including his medication reconcilation of his home medications  and PDMP   I HAVE SPOKEN WITH RN ABOUT MEDICATIONS AND DETOX SCORES  I HAVE SPOKEN WITH CM ABOUT PTS TREATMENT OPTIONS             Laboratory/Imaging:    Liver Function Studies -   Recent Labs   Lab Test 08/24/21  0756   PROTTOTAL 8.6   ALBUMIN 3.8   BILITOTAL 0.8   ALKPHOS 85   AST 36   ALT 32      Last Comprehensive Metabolic Panel:  Sodium   Date Value Ref Range Status   08/24/2021 133 133 - 144 mmol/L Final   05/31/2021 140 133 - 144 mmol/L Final     Potassium   Date Value Ref Range Status   08/24/2021 4.1 3.4 - 5.3 mmol/L Final   05/31/2021 3.7 3.4 - 5.3 mmol/L Final     Chloride   Date Value Ref Range Status   08/24/2021 97 94 - 109 mmol/L Final   05/31/2021 104 94 - 109 mmol/L Final     Carbon Dioxide   Date Value Ref Range Status   05/31/2021 28 20 - 32 mmol/L Final     Carbon Dioxide (CO2)   Date Value Ref Range Status   08/24/2021 24 20 - 32 mmol/L Final     Anion Gap   Date Value Ref Range Status   08/24/2021 12 3 - 14 mmol/L Final   05/31/2021 8 3 - 14 mmol/L Final     Glucose   Date Value Ref Range Status   08/24/2021 86 70 - 99 mg/dL Final   05/31/2021 90 70 - 99 mg/dL Final     Urea Nitrogen   Date Value Ref Range Status   08/24/2021 18 7 - 30 mg/dL Final   05/31/2021 5 (L) 7 - 30 mg/dL Final     Creatinine   Date Value Ref Range Status   08/24/2021 0.67 0.66 - 1.25 mg/dL Final    05/31/2021 0.74 0.66 - 1.25 mg/dL Final     GFR Estimate   Date Value Ref Range Status   08/24/2021 >90 >60 mL/min/1.73m2 Final     Comment:     As of July 11, 2021, eGFR is calculated by the CKD-EPI creatinine equation, without race adjustment. eGFR can be influenced by muscle mass, exercise, and diet. The reported eGFR is an estimation only and is only applicable if the renal function is stable.   05/31/2021 >90 >60 mL/min/[1.73_m2] Final     Comment:     Non  GFR Calc  Starting 12/18/2018, serum creatinine based estimated GFR (eGFR) will be   calculated using the Chronic Kidney Disease Epidemiology Collaboration   (CKD-EPI) equation.       Calcium   Date Value Ref Range Status   08/24/2021 8.5 8.5 - 10.1 mg/dL Final   05/31/2021 8.9 8.5 - 10.1 mg/dL Final     Bilirubin Total   Date Value Ref Range Status   08/24/2021 0.8 0.2 - 1.3 mg/dL Final   05/31/2021 0.9 0.2 - 1.3 mg/dL Final     Alkaline Phosphatase   Date Value Ref Range Status   08/24/2021 85 40 - 150 U/L Final   05/31/2021 87 40 - 150 U/L Final     ALT   Date Value Ref Range Status   08/24/2021 32 0 - 70 U/L Final   05/31/2021 79 (H) 0 - 70 U/L Final     AST   Date Value Ref Range Status   08/24/2021 36 0 - 45 U/L Final   05/31/2021 49 (H) 0 - 45 U/L Final                   Medical treatment/interventions:  Medical concerns: As above    - Consults: IM consult placed. Appreciate assistance.     Legal Status: Voluntary     Safety Assessment:   Checks: Status 15  Pt has not required locked seclusion or restraints in the past 24 hours to maintain safety, please refer to RN documentation for further details.    The risks, benefits, alternatives and side effects have been discussed and are understood by the patient.       Patient will be treated in therapeutic milieu with appropriate individual and group therapies as described.  Disposition: Pending clinical stabilization. Pt does  appear interested in COMPLETE DETOX AND DO TRT  Length of  "stay 3-5 days        \"Much or all of the text in this note was generated through the use of Dragon Dictate voice to text software. Errors in spelling or words which appear to be out of contact are unintentional, may be present due having escaped editing\"     "

## 2021-08-24 NOTE — DISCHARGE INSTRUCTIONS
Behavioral Discharge Planning and Instructions  THANK YOU FOR CHOOSING THE [facility]  3A  [phone]    Summary: You were admitted to Station 3[facility] on 8/24/21 for detoxification from alcohol.  A medical exam was performed that included lab work. You have met with a  and opted to return to [facility] at [facility] and sober housing.  Please take care and make your recovery a priority, Paulo!    [facility] [address]  Telephone: [phone]  Fax: [phone]    Main Diagnosis: Per Dr. Yasir Gonzales MD;  303.90 (F10.20) Alcohol Use Disorder Severe    .  Major Treatments, Procedures and Findings:  You were detoxed from alcohol with the Modified Selective Severity Protocol using Valium. You have met with a  to develop a treatment plan for discharge.  You have had labs drawn and a copy of those labs will be sent home with you.  Please bring your lab results with to your follow up doctor appointment.    Symptoms to Report:  If you experience more anxiety, confusion, sleeplessness, deep sadness or thoughts of suicide, notify your treatment team or notify your primary care physician. IF ANY OF THE SYMPTOMS YOU ARE EXPERIENCING ARE A MEDICAL EMERGENCY CALL 911 IMMEDIATELY.     Lifestyle Adjustment: Adjust your lifestyle to get enough sleep, relaxation, exercise and  good nutrition. Continue to develop healthy coping skills to decrease stress and promote a sober living environment. Do not use alcohol, illegal drugs or addictive medications other than what is currently prescribed. AA, NA, and  Sponsor are excellent resources for support.     Primary Provider: Please follow up with your primary care provider within 30 days of discharge.     Disposition: [facility] and sober housing      Facts about COVID19 at www.cdc.gov/COVID19 and www.[place].gov/covid19    Keeping hands clean is one of the most important steps we can take  "to avoid getting sick and spreading germs to others.  Please wash your hands frequently and lather with soap for at least 20 seconds!      Resources:     Resources for on line recovery meetings:      *due to covid-19 AA/NA meetings are being held online*      AA meetings can be found online; search for them at: http://aa-intergroup.org/directory.php  AA meetings via [place] for [place] area can be found online at: https://a[place].org/find-a-meeting/holiday-closings/  NA meetings via [place] for [place] area can be found online at: [email or web address]://[email or web address].com/[email or web address]?[email or web address]    Www.Genticel  has online resources for meeting and recovery care including Podcast \"Let's Talk:Addiction & Recovery Podcasts    Www.mnrecDÃ³ndey.org     DISCHARGE RESOURCES:  -SMART Recovery - self management for addiction recovery:  www.smartrecDÃ³ndey.org    -Pathways ~ A Health Crisis Resource & Support Center: 797.185.2592.  -[facility] 410-211-1901   -[facility] Behavioral Intake 802-275-0943 or 436-671-5082.  -Suicide Awareness Voices of Education (SAVE) (www.save.org): 969-872-HPLY (8132)  -National Suicide Prevention Line (www.mentalhealthmn.org): 832-587-MJBA (2406)  -National [place] on Mental Illness (www.[place].trevor.org): 707.120.5450 or 992-101-4303.  -Ctwn5elgn: text the word LIFE to 48291 for immediate support and crisis intervention  -Mental Health Consumer/Survivor Network of [place] (www.mhcsn.net): 290.285.6360 or 449-562-2379  -Mental Health Association of [place] (www.mentalhealth.org): 908.509.8014 or 880-836-9562     -Substance Abuse and Mental Health Services (www.samhsa.gov)  -Harm Reduction Coalition (www. Harmreduction.org)  -www.prescribetoprevent.org or http://prescribetoprevent.org/video  -Poison control 1-589.457.6379   **[place] Opioid Prevention Coalition: www.opioidcoalition.org    Sober Support Group Information:  AA/RACHELLE & Sponsor/Support  -Alcoholics Anonymous " (www.alcoholics-anonymous.org): for local information 24 hours/day  -AA Intergroup service office in [place] (http://www.aastpaul.org/) 658.331.7524  -AA Intergroup service office in [facility]: 258.618.5101. (http://www.a[place].org/)  -Narcotics Anonymous (www.nam[place].org) (702) 981-3732   **Sober Fun Activities: www.soberSociogramicsactivitiesCrowdTangle/[facility]//[facility] Recovery Connection ([facility])  [facility] connects people seeking recovery to resources that help foster and sustain long-term recovery.  Whether you are seeking resources for treatment, transportation, housing, job training, education, health care or other pathways to recovery, [facility] is a great place to start.    Phone: 874.128.2363.  www.[place]Roomish (Great listing of all types of recovery and non-recovery related resources)      General Medication Instructions:   See your medication sheet(s) for instructions.   Take all medicines as directed.  Make no changes unless your doctor suggests them.   Go to all your doctor visits.  Be sure to have all your required lab tests. This way, your medicines can be refilled on time.  Do not use any drugs not prescribed by your provider.  AA/NA and Sponsors are excellent resources for support  Avoid alcohol.    Any follow up concerns:  Nursing questions call the Unit [facility] [phone]  Medical Record call [phone]  Outpatient Behavioral Intake call [phone]  LP+ Wait List/Bed Availability call [phone]    The entire treatment team has appreciated the opportunity to work with you Paulo.  We wish you the best in the future and with your lifelong recovery goals. Please bring this discharge folder with you to all follow up appointments.  It contains your lab results, diagnosis, medication list and discharge recommendations.    THANK YOU FOR CHOOSING THE [facility]

## 2021-08-24 NOTE — ED NOTES
Patient unsteady to the bathroom; standby assist. Patient drank water before lying back down to sleep.

## 2021-08-24 NOTE — PLAN OF CARE
Behavioral Team Discussion: (8/24/2021)    Continued Stay Criteria/Rationale: Patient admitted for Chemical Use Issues.  Plan: The following services will be provided to the patient; psychiatric assessment, medication management, therapeutic milieu, individual and group support, and skills groups.   Participants: 3A Provider: Dr. Yasir Gonzales MD; 3A RN: Dixie Salmon RN; 3A CM's: Mya Cabrera  and Dea Florez.  Summary/Recommendation: Providers will assess today for treatment recommendations, discharge planning, and aftercare plans. CM will meet with pt for discharge planning.   Medical/Physical: No biomedical concerns noted upon admission  Precautions:   Behavioral Orders   Procedures     Code 1 - Restrict to Unit     Discontinue 1:1 attendant for suicide risk     Order Specific Question:   I have performed an in person assessment of the patient     Answer:   Based on this assessment the patient no longer requires a one on one attendant at this point in time.     Order Specific Question:   Rationale     Answer:   Patient States able to remain safe in hospital     Routine Programming     As clinically indicated     Seizure precautions     Status 15     Every 15 minutes.     Withdrawal precautions     Rationale for change in precautions or plan: N/A  Progress: Initial.

## 2021-08-24 NOTE — ED PROVIDER NOTES
History     Chief Complaint   Patient presents with     Alcohol Intoxication     Pt BIB EMS from sober house. Intoxicated. Welfare check by [employer]. Kicked out of sober house. BG 98. Reported to EMS that he had been drinking vodka of unknown amount over an unknown period of time. 4 zofran IM en route. 18g R FA. 500mL of NS.     HPI  Paulo Franco is a 45 year old male who presents to the ER via ambulance for the above noted problem.  Patient states he has been drinking at least a liter a day recently.  Denies any vomiting diarrhea melena or bright blood per rectum and denies any hematemesis pancreatitis or hepatitis related to his drinking.  The patient states that he has had no fevers no shortness of breath no coughing and otherwise has been doing well at the sober house until they discovered that he was drunk.    I have reviewed the Medications, Allergies, Past Medical and Surgical History, and Social History in the [employer] system.    Past Medical History:   Diagnosis Date     Concussion      Substance abuse (H)     Done with treatment on 7/16/2018       Past Surgical History:   Procedure Laterality Date     HAND SURGERY Left            Dose / Directions   folic acid 1 MG tablet  Commonly known as: FOLVITE  Used for: Alcohol withdrawal syndrome without complication (H)      Dose: 1 mg  Take 1 tablet (1 mg) by mouth daily  Quantity: 30 tablet  Refills: 0     hydrOXYzine 25 MG tablet  Commonly known as: ATARAX  Used for: Alcohol withdrawal syndrome without complication (H)      Dose: 25 mg  Take 1 tablet (25 mg) by mouth every 4 hours as needed for anxiety  Quantity: 30 tablet  Refills: 0     multivitamin w/minerals tablet  Used for: Alcohol withdrawal syndrome without complication (H)      Dose: 1 tablet  Take 1 tablet by mouth daily  Quantity: 30 tablet  Refills: 0     pantoprazole 40 MG EC tablet  Commonly known as: PROTONIX  Used for: Alcohol withdrawal syndrome without complication (H)      Dose: 40 mg  Take 1  tablet (40 mg) by mouth 2 times daily  Quantity: 60 tablet  Refills: 0     propranolol 10 MG tablet  Commonly known as: INDERAL      Dose: 10-20 mg  Take 1-2 tablets (10-20 mg) by mouth 3 times daily as needed (anxiety)  Quantity:    Refills: 0     sucralfate 1 GM tablet  Commonly known as: CARAFATE  Used for: Alcohol withdrawal syndrome without complication (H)      Dose: 1 g  Take 1 tablet (1 g) by mouth 4 times daily (before meals and nightly)  Quantity: 120 tablet  Refills: 0     thiamine 100 MG tablet  Commonly known as: B-1  Used for: Alcohol withdrawal syndrome without complication (H)      Dose: 100 mg  Take 1 tablet (100 mg) by mouth daily  Quantity: 30 tablet  Refills: 0     traZODone 50 MG tablet  Commonly known as: DESYREL  Used for: Alcohol withdrawal syndrome without complication (H)      Dose: 50 mg  Take 1 tablet (50 mg) by mouth nightly as needed for sleep (may repeat after 60 minutes)  Quantity: 30 tablet  Refills: 0            Past medical history, past surgical history, medications, and allergies were reviewed with the patient. Additional pertinent items: None    Family History   Problem Relation Age of Onset     Polycystic Kidney Diease Mother      Substance Abuse Mother      Depression Mother      Heart Defect Father      Heart Defect Brother        Social History     Tobacco Use     Smoking status: Never Smoker     Smokeless tobacco: Never Used   Substance Use Topics     Alcohol use: Yes     Comment: 1-2 liters of Vodka per day     Social history was reviewed with the patient. Additional pertinent items: None    No Known Allergies    Review of Systems  A complete review of systems was performed with pertinent positives and negatives noted in the HPI, and all other systems negative.    Physical Exam   BP: 122/70  Pulse: 115  Temp: 98.7  F (37.1  C)  Resp: 18  SpO2: 95 %      Physical Exam  Vitals and nursing note reviewed.   Constitutional:       Comments: Grossly intoxicated   HENT:      Head:  Atraumatic.      Mouth/Throat:      Pharynx: Oropharynx is clear.   Eyes:      Extraocular Movements: Extraocular movements intact.      Pupils: Pupils are equal, round, and reactive to light.   Cardiovascular:      Rate and Rhythm: Regular rhythm.   Pulmonary:      Breath sounds: Normal breath sounds.   Abdominal:      Palpations: Abdomen is soft.      Tenderness: There is no abdominal tenderness.   Musculoskeletal:         General: No deformity.      Cervical back: Neck supple.   Skin:     General: Skin is warm.   Neurological:      General: No focal deficit present.      Mental Status: He is alert and oriented to person, place, and time.   Psychiatric:         Mood and Affect: Mood normal.         ED Course        Procedures           Results for orders placed or performed during the hospital encounter of 08/23/21 (from the past 24 hour(s))   Alcohol breath test POCT   Result Value Ref Range    Alcohol Breath Test 0.291 (A) 0.00 - 0.01         Assessments & Plan (with Medical Decision Making)     I have reviewed the nursing notes.    Case was discussed with Argenis Patel who accepted the patient to their detox unit.    Medications - No data to display     I have reviewed the findings, diagnosis, plan and need for follow up with the patient.    New Prescriptions    No medications on file       Final diagnoses:   Acute alcoholic intoxication in alcoholism without complication (H)     You are being sent to [facility].    Please arrange readmission to your sober house after you detox and/or arrange for treatment program through [facility].    Everardo Andino MD, MD    8/23/2021   [facility] EMERGENCY DEPARTMENT     Everardo Andino MD  08/23/21 2035

## 2021-08-24 NOTE — TELEPHONE ENCOUNTER
"S: Jef gave clinical saying pt was bib ems last jeevan to [facility] ED from his sober house intoxicated.  B: He got \"kicked out\" of his sober house yesterday. Breath was .29 (yest at 7 pm). There were no detox beds here last night. He refused to go to [address]. He reports he has been drinking 1 liter of vodka daily for 1 wk. Prior to this he says he was sober 6 months. Hx of many cd treatments. He is currently in outpt cd at [facility]. Pt denies drug use. Utox neg. No chronic med prob's. Covid test neg. No Covid symptoms.   A: hopeless, passive SI but no plan or internet, contracts for safety in hospital & outside of hospital, appears to be in withdrawal, vol, coop, walking indep    Patient cleared and ready for behavioral bed placement: Yes     R: Jef agreed to call intake back once pt is med cleared. sybil Fuchs called saying pt is med cleared at 9:53 am. sybil  Paged Anastacia at 10:10 am         sybil  #3awest/straight cd per anastacia/anastacia accepted for herself                     Unit notified at 10:31 am/left msg asking for Rn to call back; Steven called back shortly afterwards      Er notified at 10:34 am    sybil    "

## 2021-08-24 NOTE — ED NOTES
"8/23/2021  Paulo PEREZ Doak 1975     [facility] Crisis Assessment:    Started at: 7:00am  Completed at: 7:45am  Patient was assessed via in-person.    Chief Complaint and History of Presenting Problem:    Patient is a 45 year old  male who presented to the ED by EMS related to concerns for alcohol abuse.      Patient was brought in by EMS from sober housing intoxicated.  Patient's alcohol breath test was 0.291 upon arrival last night.  Patient was given Zofran IM and route.  Patient was medically evaluated by Dr. Everardo Andino.  There were no detox beds on [address] at this time.  As a result, patient was initially set to discharge with follow-up at [facility].  At time of discharge, patient reportedly began expressing newly onset suicidal ideation.  Patient was allowed to board overnight with DEC assessment this morning.    Patient states he had been sober for 6 months until 1 week ago.  Patient reports he relapsed due to feeling ill from a brief cold.  Patient tested negative for Covid during this ED encounter.  Patient reports he has been drinking 1 L of vodka per day for the past 1 week.  Patient endorses history of seizures, most recently 1.5 years ago.  Patient endorses current withdrawal symptoms including difficulty concentrating, nausea, \"seeing all sorts of shit \", and hearing muffled voices.  Patient reports he is \"in full on DTs \".  Patient reports due to uncomfortable withdrawal symptoms, he has been increasingly hopeless for the past 12 hours.  Patient reports he has been \"debating taking his own life all night \".  Patient endorses \"fleeting\" suicidal ideation. Patient denies any specific intent or plan for suicide.  Patient reports he is \"too chicken shit\" to harm himself.  Patient denies any prior suicide attempts.  Patient reports he knows this feeling will pass. Patient believes he will feel better both physically and emotionally once he receives Valium for his withdrawal symptoms.  Patient " reports he would not feel suicidal if admitted, and reports he can keep himself safe on detox unit.    Assessment and intervention involved meeting with pt, obtaining collateral from [facility] and Care Everywhere records, employing crisis psychotherapy including: Establishing rapport, Active listening, Assess dimensions of crisis, Apply solution-focused therapy to address current crisis, Identify additional supports and alternative coping skills, Motivational Interviewing, Brief Supportive Therapy, Trauma-Informed Care and Safety planning.    Biopsychosocial Background and Demographic Information    Patient is currently unemployed.  Patient was previously a  at a local [employer].  Patient's highest level of education is a technical college degree.Patient most recently resided in sober housing, [facility].  Patient reports this is located in [facility]. Patient reports that he has lived there for a number of months.  Patient was reportedly kicked out of his sober house yesterday due to alcohol intoxication.     Mental Health History and Current Symptoms     Patient identifies historical diagnoses of alcohol abuse and anxiety.     Mental Health History (prior psychiatric hospitalizations, civil commitments, programmatic care, etc): Patient denies prior inpatient mental health admissions.  Patient has prior inpatient detox admissions, most recently here at [facility] in 5/2021.  Family Mental and Chemical Health History: Unknown.    Current and Historic Psychotropic Medications: None.  Medication Adherent: N/A  Recent medication changes? No    Relevant Medical Concerns  Patient identifies concerns with completing ADLs? Yes  Patient can ambulate independently? Yes  Other medical health concerns? No  History of concussion or TBI? Yes concussions from wrestling during adolescence.     Trauma History   Physical, Emotional, or Sexual abuse: Unknown.  Loss of a friend or family member to suicide: Unknown.  Other  "identified traumatic event or significant stressor: Unknown.    Substance Use History and Treatments  Patient reports he has been drinking 1 L of vodka per day for the past 1 week.  Patient endorses history of seizures, most recently 1.5 years ago.  Patient endorses current withdrawal symptoms including difficulty concentrating, nausea, \"seeing all sorts of shit \", and hearing muffled voices.  Patient reports he is \"in full on DTs \".  Patient denies any other substance use.    Drug screen/BAL/Breathalyzer Completed? Yes   Results: Alcohol breath test was 0.291 upon arrival last night at 7 PM.  U tox completed, pending results.    History of Suicidal Ideation, Suicide Attempts, Non-Suicidal Self Injury, and Risk Formulation:   Details of Current Ideation, Attempt(s), Plan(s): Patient endorses passive suicidal ideation for the past 12 hours secondary to uncomfortable alcohol withdrawal symptoms.  Patient denies any intent or plan for suicide.  Risk factors: loss of relationship, separation/divorce, etc., disengagement with or distrust of medical/mental health care, helplessness/hopelessness, impulsivity/recklessness and history of or current substance use.   Protective factors: positive working relationship with existing medical/mental health providers and engaged and/or invested in treatment.  History and Prior Methods of Self-injury: Patient denies.  History of Suicide Attempts: Patient denies.    ESS-6  1.a. Over the past 2 weeks, have you had thoughts of killing yourself? Yes   1.b. Have you ever attempted to kill yourself and, if yes, when did this last happen? No  2. Recent or current suicide plan? No  3. Recent or current intent to act on ideation? No  4. Lifetime psychiatric hospitalization? No  5. Pattern of excessive substance use? Yes  6. Current irritability, agitation, or aggression? No  ESS-6 Score: Mild    Other Risk Areas  Aggressive/assumptive/homicidal risk factors: No   Sexually inappropriate " "behavior? No      Vulnerability to sexual exploitation? No     Clinical Presentation and Current Symptoms   Patient is alert and oriented x4. Patient is poorly dressed and groomed.  Patient's hair is greasy and matted to his head, socks are mismatched. Patient has articulate speech with pressured rate and normal volume. Patient has anxious mood and dramatic affect. Patient presents with organized thought process. Patient was cooperative with the assessment process.  Patient is restless, presents with psychomotor agitation likely secondary to alcohol withdrawal.    Attention, Hyperactivity, and Impulsivity: Yes: Impulsive and Restless   Anxiety:Yes: Generalized Symptoms: Agitation, Avoidance, Cognitive anxiety - feelings of doom, racing thoughts, difficulty concentrating , Excessive worry, Pacing and Physiological anxiety - sweating, flushing, shaking, shortness of breath, or racing heart    Behavioral Difficulties: Yes: Displaces Blame and Impulsivity/Disinhibition   Mood Symptoms: Yes: Feelings of helplessness , Feelings of hopelessness , Feelings of worthlessness , Impaired decision making , Sad, depressed mood  and Thoughts of suicide/death    Appetite: No   Feeding and Eating: No  Interpersonal Functioning: Yes: Displaces Blame, Emotional Deregulation and Impaired Impulse Control  Learning Disabilities/Cognitive/Developmental Disorders: No   General Cognitive Impairments: Yes: Decision-Making and Judgment/Insight  If yes, see completed Mini-Cog Assessment below.  Sleep: Yes: Difficulty falling asleep    Psychosis: Patient alleges \"seeing all sorts of shit\" including \"stuff on the floor, walls, tracer lights\". Patient endorses hearing indiscriminate mumbling.  Patient reports this occurs when he is in \"full on DTs \".  Patient denies history of any non-substance induced psychosis.  Trauma: No     Mental Status Exam:  Affect: Dramatic  Appearance: Disheveled   Attention Span/Concentration: Inattentive    Eye " "Contact: Variable  Fund of Knowledge: Appropriate   Language /Speech Content: Fluent  Language /Speech Volume: Normal   Language /Speech Rate/Productions: Pressured   Recent Memory: Intact  Remote Memory: Intact  Mood: Anxious   Orientation:   Person: Yes   Place: Yes  Time of Day: Yes   Date: Yes   Situation (Do they understand why they are here?): Yes   Psychomotor Behavior: Agitated   Thought Content: Clear  Thought Form: Intact    Current Providers and Contact Information   Patient is his own legal guardian.     Primary Care Provider: No  Psychiatrist: No  Therapist: Mae Egan MS, [facility]  Sober House: [facility]  Chemical Dependency Treatment: [facility]     Has an ADILIA been signed? Yes ; For all providers listed above; By: Paulo Franco; Relationship to patient: self.     Clinical Summary and Recommendations    Clinical summary of assessment (include strengths, protective factors, community resources, and assessment of vulnerability/risk):    Patient presents for detox from alcohol. Patient reports he has been drinking 1 L of vodka per day for the past 1 week.  Patient endorses history of seizures, most recently 1.5 years ago.  Patient endorses current withdrawal symptoms including difficulty concentrating, nausea, \"seeing all sorts of shit \", and hearing muffled voices.  Patient reports he is \"in full on DTs \". Patient reports due to uncomfortable withdrawal symptoms, he has been increasingly hopeless for the past 12 hours.  Patient reports he has been \"debating taking his own life all night \".  Patient endorses \"fleeting\" suicidal ideation. Patient denies any specific intent or plan for suicide.  Patient denies any self-injurious behavior.  Patient denies any homicidal ideation, intent, or plan.  Patient endorses symptoms of psychosis secondary to alcohol withdrawal.  Patient does not appear to be responding to internal stimuli.     Patient's protective factors include " [facility] and [facility].  Patient's vulnerabilities include alcohol abuse and depressive symptoms.    Diagnosis with F Codes:      1 Unspecified Mood Disorder F39      2 Alcohol abuse with intoxication, uncomplicated, by history F10.120    Disposition  Attending provider, Dr. Ayo Colbert consulted and does  agree with recommended disposition which includes Detox. There were no inpatient detox beds available last night, so patient was initially going to be referred to [address] [place] [address].  Patient reportedly refused this due to increasing depressive symptoms while in the emergency department.  Per central intake, there are now currently openings on [address].  Patient will be referred for inpatient detox at [facility].  Patient is agreeable to this setting for detox care.  Patient reports he can keep himself safe on detox unit.  Patient referred following medical clearance with CBC, CMP, and utox.     If Inpatient, is patient admitted voluntary? Yes   Patient aware of potential for transfer if there is not appropriate placement? No  [facility]? No   Central Intake Notified? 8/24/2021 at 8:00am.    Duration of assessment time: .50 hrs    CPT code(s) utilized: 45070, up to 74 minutes      JNO Reeves LICSW

## 2021-08-25 LAB — SARS-COV-2 RNA RESP QL NAA+PROBE: NEGATIVE

## 2021-08-25 PROCEDURE — 250N000011 HC RX IP 250 OP 636: Performed by: PSYCHIATRY & NEUROLOGY

## 2021-08-25 PROCEDURE — 250N000013 HC RX MED GY IP 250 OP 250 PS 637: Performed by: PSYCHIATRY & NEUROLOGY

## 2021-08-25 PROCEDURE — H2032 ACTIVITY THERAPY, PER 15 MIN: HCPCS

## 2021-08-25 PROCEDURE — 99222 1ST HOSP IP/OBS MODERATE 55: CPT | Performed by: PHYSICIAN ASSISTANT

## 2021-08-25 PROCEDURE — 99231 SBSQ HOSP IP/OBS SF/LOW 25: CPT | Performed by: PSYCHIATRY & NEUROLOGY

## 2021-08-25 PROCEDURE — U0005 INFEC AGEN DETEC AMPLI PROBE: HCPCS | Performed by: PSYCHIATRY & NEUROLOGY

## 2021-08-25 PROCEDURE — 128N000004 HC R&B CD ADULT

## 2021-08-25 PROCEDURE — 99207 PR CONSULT E&M CHANGED TO INITIAL LEVEL: CPT | Performed by: PHYSICIAN ASSISTANT

## 2021-08-25 RX ADMIN — DIAZEPAM 10 MG: 5 TABLET ORAL at 16:19

## 2021-08-25 RX ADMIN — MULTIPLE VITAMINS W/ MINERALS TAB 1 TABLET: TAB at 08:20

## 2021-08-25 RX ADMIN — DIAZEPAM 10 MG: 5 TABLET ORAL at 08:20

## 2021-08-25 RX ADMIN — THIAMINE HCL TAB 100 MG 100 MG: 100 TAB at 08:20

## 2021-08-25 RX ADMIN — DIAZEPAM 10 MG: 5 TABLET ORAL at 12:07

## 2021-08-25 RX ADMIN — FOLIC ACID 1 MG: 1 TABLET ORAL at 08:20

## 2021-08-25 RX ADMIN — ONDANSETRON HYDROCHLORIDE 4 MG: 4 TABLET, FILM COATED ORAL at 08:22

## 2021-08-25 RX ADMIN — DIAZEPAM 5 MG: 5 TABLET ORAL at 21:14

## 2021-08-25 ASSESSMENT — ACTIVITIES OF DAILY LIVING (ADL)
ORAL_HYGIENE: INDEPENDENT
HYGIENE/GROOMING: INDEPENDENT
HYGIENE/GROOMING: INDEPENDENT
DRESS: INDEPENDENT
ORAL_HYGIENE: INDEPENDENT
DRESS: INDEPENDENT
LAUNDRY: WITH SUPERVISION

## 2021-08-25 NOTE — CONSULTS
[facility]  Consult Note - Hospitalist Service     Date of Admission:  8/23/2021  Consult Requested by: Psychiatry  Reason for Consult: Medical co-management    Assessment & Plan   Paulo Franco is a 45 year old male admitted on 8/23/2021 for recurrent alcohol abuse and was admitted to inpatient psychiatry.  Internal medicine was consulted for comanagement of medical issues    Acute alcohol abuse  Polysubstance abuse  -Continue with MSSA using Valium prn  -Continue with folic acid, thiamine and MV daily  -Home medications include Xanax, Propanolol, Trazodone but have not been ordered yet - will leave this to the discretion of the primary team  -Labs on admission including CBC, BMP, hepatic panel were without any acute abnormalities    History of esophagitis and hematemesis (June 2021) requiring admission  He has no further signs or symtoms of this currently. Continue to monitor symptoms intermittently  -Continue home regimen of Protonix 40mg twice daily  -Continue home regimen of Sucralfate 1g four times daily        HCETOR Chaudhry  [facility]  Securely message with the Vocera Web Console (learn more here)  Text page via [facility] Paging/Directory      Clinically Significant Risk Factors Present on Admission                   Chief Complaint   Alcohol withdrawal    History of Present Illness   Paulo Franco is a 45 year old male admitted on 8/23/2021. He has a past medical history of alcohol abuse and reports being sober since his last admission June of 2021. After his recent binge drinking episode, he presented to  ER on 8/24/21 with suicidal ideations. Vital signs were 134/97, 98.3  F, heart rate 120 and regular, 94% on room air.  He denies any chest pain, shortness of breath, fevers, chills, diarrhea, abdominal pain.  He is quite shaky and scared and states that he is gone through withdrawals many times in the past.   He denies any history of seizures but has had hallucinations intermittently during withdrawal.  He is being admitted to inpatient psychiatry for further care.  Internal medicine was consulted for comanagement.    Review of Systems   The 10 point Review of Systems is negative other than noted in the HPI or here.     Past Medical History    I have reviewed this patient's medical history and updated it with pertinent information if needed.   Past Medical History:   Diagnosis Date     Concussion      Substance abuse (H)     Done with treatment on 7/16/2018       Past Surgical History   I have reviewed this patient's surgical history and updated it with pertinent information if needed.  Past Surgical History:   Procedure Laterality Date     HAND SURGERY Left        Social History   I have reviewed this patient's social history and updated it with pertinent information if needed.  Social History     Tobacco Use     Smoking status: Never Smoker     Smokeless tobacco: Never Used   Substance Use Topics     Alcohol use: Yes     Comment: 1-2 liters of Vodka per day     Drug use: No       Family History   I have reviewed this patient's family history and updated it with pertinent information if needed.  Family History   Problem Relation Age of Onset     Polycystic Kidney Diease Mother      Substance Abuse Mother      Depression Mother      Heart Defect Father      Heart Defect Brother        Medications   I have reviewed this patient's current medications    Allergies   No Known Allergies    Physical Exam   Vital Signs: Temp: 98.2  F (36.8  C) Temp src: Temporal BP: 113/79 Pulse: 91   Resp: 16 SpO2: 93 % O2 Device: None (Room air)    Weight: 156 lbs 0 oz    General Appearance: 45-year-old gentleman quite shaky sitting at the edge of his bed, no acute distress, denies pain  HEENT: Normocephalic, atraumatic, pupils are equally reactive to light bilaterally  Respiratory: Breathing comfortably on room air, no adventitious sounds to  bilateral auscultation  Cardiovascular: Regular rate and rhythm, no appreciable murmurs rubs or gallops  GI: Tinkering bowel sounds present, soft, nontender to palpation throughout, no rebound or guarding, mild hepatic megaly present  Skin: Warm, dry, no open sores lesions or ulcerations  Musculoskeletal: Maintains equal strength in his bilateral upper and lower extremities  Neurologic: No focal neuro deficits  Psychiatric: Alert, oriented to name, date, hospital and recent events, quite anxious and shaky today but pleasant in conversation    Data   No results found for this or any previous visit (from the past 24 hour(s)).

## 2021-08-25 NOTE — PLAN OF CARE
Pt is monitored for alcohol withdrawal, MSSA of 14, 11, 5. Pt received 10 mg valium x2. Third assessment was done at 21:30 and patient was deeply sleeping, thus the score of 5.     Pt is showing significant withdrawal symptoms, including audio/visual hallucinations. Pt states he knows hallucinations are not real.

## 2021-08-25 NOTE — PROGRESS NOTES
"[facility], [place], [place]   Psychiatric Progress Note        Interim history   This is a 45 year old male with       Alcohol use disorder severe  .Pt seen in rounds.   The patient's care was discussed with the treatment team during the daily team meeting and/or staff's chart notes were reviewed.  Staff report patient has been visible in the milieu,  no acute eventsovernight.     Patient's mood is \"acceptable\"in spits and sprusts  Energy Level:lethargic   Sleep:No concerns, sleeps well through night  Appetite:fair improving  motivation interest   Denied any Suicidal/homicidal ideation/plan intent.  Denied any psychosis  No prior suicde attempts  No access to gun  Pt is in alcohol withdrawal still being monitered every 4 hrs for it,   Pt mssa score are monitered  Tolerating meds and has no side effects.              Medications:     Current Facility-Administered Medications   Medication     atenolol (TENORMIN) tablet 50 mg     diazepam (VALIUM) tablet 5-20 mg     folic acid (FOLVITE) tablet 1 mg     multivitamin w/minerals (THERA-VIT-M) tablet 1 tablet     ondansetron (ZOFRAN) tablet 4 mg     thiamine (B-1) tablet 100 mg             Allergies:   No Known Allergies         Psychiatric Examination:   Blood pressure 111/76, pulse 89, temperature 98.9  F (37.2  C), temperature source Temporal, resp. rate 14, height 1.829 m (6'), weight 70.8 kg (156 lb), SpO2 93 %.  Weight is 156 lbs 0 oz  Body mass index is 21.16 kg/m .    Appearance:  awake, alert and adequately groomed  Attitude:  cooperative  Eye Contact:  good  Mood:  better  Affect:  appropriate and in normal range and mood congruent  Speech:  clear, coherent rate /rhythm are good  Psychomotor Behavior:  no evidence of tardive dyskinesia, dystonia, or tics and fidgeting  Throught Process:  logical  Associations:  no loose associations  Thought Content:  no evidence of suicidal ideation or homicidal ideation, no evidence of psychotic thought, no " auditory hallucinations present and no visual hallucinations present  Insight:  fair  Judgement:  intact  Oriented to:  time, person, and place  Attention Span and Concentration:  intact  Recent and Remote Memory:  intact  Language fund of knowledge are adequate         Labs:     No results found for: NTBNPI, NTBNP  Lab Results   Component Value Date    WBC 9.6 08/24/2021    HGB 16.1 08/24/2021    HCT 45.0 08/24/2021    MCV 92 08/24/2021     08/24/2021     Lab Results   Component Value Date    TSH 1.04 05/30/2021         DX alcohol use dx severe     PLAN   Alcohol intoxication/withdrawal, presently is on MSSA protocol with Valium. Continue the same MSSA protocol as ordered. Continue thiamine 100 mg p.o. daily, M.V.I. one p.o. daily and folate 1 mg p.o. Daily  Will continue mssa protocal to detox off alcohol on valium,  Pt is c/o of termor , agitation poor sleep and poor appetite, he has sweats, feels shakey  On mssa client scored [id number] today and  needed needed 20 mg po as of yet , total dose since admission was 95mg     MSSA    Eating Disturbances: 2  Tremor: 2  Sleep Disturbance: slept through the night or not applicable  Clouding of Sensorium: no evidence  Hallucinations: 2 - visual  Quality of Contact: 0 - awareness of examiner and people around him/her  Agitation: 2  Paroxysmal Sweats: 1 - barely perceptible sweating  Temperature: 99.5 or below  Pulse: 2 - 80 to 89  Total MSSA Score: 12    Laboratory/Imaging: reviewed with patient   Consults: internal medicine consult reviewed  Patient will be treated in therapeutic milieu with appropriate individual and group therapies as described.  PDMP CHECKED     Supportive psychotheraoy provided, roselyn talked about recovery enviroment, relapse prevention, triggers to use.  Discussed with patient many issues of addiction,triggers, relapse, and establishing a solid recovery program.  Asked pt to be med complinat   Medical diagnoses to be addressed this admission:     Plan:  Assessment & Plan     Paulo Franco is a 45 year old male admitted on 8/23/2021 for recurrent alcohol abuse and was admitted to inpatient psychiatry.  Internal medicine was consulted for comanagement of medical issues     Acute alcohol abuse  Polysubstance abuse  -Continue with MSSA using Valium prn  -Continue with folic acid, thiamine and MV daily  -Home medications include Xanax, Propanolol, Trazodone but have not been ordered yet - will leave this to the discretion of the primary team  -Labs on admission including CBC, BMP, hepatic panel were without any acute abnormalities     History of esophagitis and hematemesis (June 2021) requiring admission  He has no further signs or symtoms of this currently. Continue to monitor symptoms intermittently  -Continue home regimen of Protonix 40mg twice daily  -Continue home regimen of Sucralfate 1g four times daily           Legal Status: voluntary    Safety Assessment:   Checks:  15 min  Precautions: withdrawal precautions  Pt has not required locked seclusion or restraints in the past 24 hours to maintain safety, please refer to RN documentation for further details.  Discussed with patient many issues of addiction,triggers, relapse, and establishing a solid recovery program.  Able to give informed consent:  YES   Discussed Risks/Benefits/Side Effects/Alternatives: YES    After discussion of the indications, risks, benefits, alternatives and consequences of no treatment, the patient elects to complete detox and do trt

## 2021-08-25 NOTE — PLAN OF CARE
"  Problem: General Rehab Plan of Care  Goal: Therapeutic Recreation/Music Therapy Goal  Description: The patient and/or their representative will achieve their patient-specific goals related to the plan of care.  Outcome: No Change     Paulo attended art therapy group (12 patients total). He reported feeling \"still sick.\" Patient participated in the art directive to make a mandala for each core value chosen. He engaged in group discussion about values. Patient interacted appropriately with peers, sharing supplies and having a friendly conversation with peer. He shared that his top value was \"love.\"   "

## 2021-08-25 NOTE — PLAN OF CARE
Pt continues to be monitored for alcohol withdrawal. MSSA scores this shift 12 and 12. Pt endorses auditory and visual hallucinations related to alcohol withdrawal, states that he sees indistinct shapes out of the corner of his eyes and hears voices but is unable to hear specifically what they are saying. Endorses mild depression 3/10 and severe anxiety 9/10. Pt appears with flat affect. Pt plans to return to [facility] outpatient  treatment and sober housing on discharge. PRN zofran given for nausea which was effective.

## 2021-08-25 NOTE — PROGRESS NOTES
Met with Pt for discharge planning.  Pt reports a plan to return to [place] [id number] and sober housing.  Pt provided with phone numbers for program and sober housing list.

## 2021-08-26 PROCEDURE — 250N000013 HC RX MED GY IP 250 OP 250 PS 637: Performed by: PSYCHIATRY & NEUROLOGY

## 2021-08-26 PROCEDURE — 99207 PR NO CHARGE LOS: CPT | Performed by: PHYSICIAN ASSISTANT

## 2021-08-26 PROCEDURE — 99231 SBSQ HOSP IP/OBS SF/LOW 25: CPT | Performed by: PSYCHIATRY & NEUROLOGY

## 2021-08-26 PROCEDURE — 128N000004 HC R&B CD ADULT

## 2021-08-26 RX ORDER — ACETAMINOPHEN 325 MG/1
650 TABLET ORAL EVERY 4 HOURS PRN
Status: DISCONTINUED | OUTPATIENT
Start: 2021-08-26 | End: 2021-08-27 | Stop reason: HOSPADM

## 2021-08-26 RX ADMIN — THIAMINE HCL TAB 100 MG 100 MG: 100 TAB at 08:47

## 2021-08-26 RX ADMIN — MULTIPLE VITAMINS W/ MINERALS TAB 1 TABLET: TAB at 08:47

## 2021-08-26 RX ADMIN — FOLIC ACID 1 MG: 1 TABLET ORAL at 08:47

## 2021-08-26 RX ADMIN — ACETAMINOPHEN 650 MG: 325 TABLET, FILM COATED ORAL at 12:14

## 2021-08-26 RX ADMIN — ACETAMINOPHEN 650 MG: 325 TABLET, FILM COATED ORAL at 20:12

## 2021-08-26 RX ADMIN — DIAZEPAM 5 MG: 5 TABLET ORAL at 08:47

## 2021-08-26 ASSESSMENT — ACTIVITIES OF DAILY LIVING (ADL)
DRESS: INDEPENDENT
HYGIENE/GROOMING: INDEPENDENT
LAUNDRY: WITH SUPERVISION
ORAL_HYGIENE: INDEPENDENT

## 2021-08-26 NOTE — PROGRESS NOTES
[facility], [place]   Psychiatric Progress Note        Interim history   This is a 45 year old male with       Alcohol use disorder severe  .Pt seen in rounds.   The patient's care was discussed with the treatment team during the daily team meeting and/or staff's chart notes were reviewed.  Staff report patient has been visible in the milieu,  no acute eventsovernight.     Patient's mood is better  Energy Level:better  Sleep:No concerns, sleeps well through night  Appetite:fair improving  motivation interest   Denied any Suicidal/homicidal ideation/plan intent.  Denied any psychosis  No prior suicde attempts  No access to gun  Pt is in alcohol withdrawal still being monitered every 4 hrs for it,   Pt mssa score are monitered  Tolerating meds and has no side effects.              Medications:     Current Facility-Administered Medications   Medication     atenolol (TENORMIN) tablet 50 mg     diazepam (VALIUM) tablet 5-20 mg     folic acid (FOLVITE) tablet 1 mg     multivitamin w/minerals (THERA-VIT-M) tablet 1 tablet     ondansetron (ZOFRAN) tablet 4 mg     thiamine (B-1) tablet 100 mg             Allergies:   No Known Allergies         Psychiatric Examination:   Blood pressure 117/84, pulse 104, temperature 98  F (36.7  C), temperature source Temporal, resp. rate 16, height 1.829 m (6'), weight 70.8 kg (156 lb), SpO2 94 %.  Weight is 156 lbs 0 oz  Body mass index is 21.16 kg/m .    Appearance:  awake, alert and adequately groomed  Attitude:  cooperative  Eye Contact:  good  Mood:  better  Affect:  appropriate and in normal range and mood congruent  Speech:  clear, coherent rate /rhythm are good  Psychomotor Behavior:  no evidence of tardive dyskinesia, dystonia, or tics and fidgeting  Throught Process:  logical  Associations:  no loose associations  Thought Content:  no evidence of suicidal ideation or homicidal ideation, no evidence of psychotic thought, no auditory hallucinations  present and no visual hallucinations present  Insight:  fair  Judgement:  intact  Oriented to:  time, person, and place  Attention Span and Concentration:  intact  Recent and Remote Memory:  intact  Language fund of knowledge are adequate         Labs:     No results found for: NTBNPI, NTBNP  Lab Results   Component Value Date    WBC 9.6 08/24/2021    HGB 16.1 08/24/2021    HCT 45.0 08/24/2021    MCV 92 08/24/2021     08/24/2021     Lab Results   Component Value Date    TSH 1.04 05/30/2021         DX alcohol use dx severe     PLAN   Alcohol intoxication/withdrawal, presently is on MSSA protocol with Valium. Continue the same MSSA protocol as ordered. Continue thiamine 100 mg p.o. daily, M.V.I. one p.o. daily and folate 1 mg p.o. Daily  Will continue mssa protocal to detox off alcohol on valium,  Pt is c/o of termor , agitation poor sleep and poor appetite, he has sweats, feels shakey  On mssa client scored scored8 today and  needed needed 5mg po as of yet , total dose since admission was 115mg     MSSA    Eating Disturbances: ate and enjoyed all of it or not applicable  Tremor: 0 - no tremor  Sleep Disturbance: slept through the night or not applicable  Clouding of Sensorium: no evidence  Hallucinations: 0 - none  Quality of Contact: 0 - awareness of examiner and people around him/her  Agitation: 0 - normal activity  Paroxysmal Sweats: 2  Temperature: 99.5 or below  Pulse: 1 - 70 to 79  Total MSSA Score: 4    Laboratory/Imaging: reviewed with patient   Consults: internal medicine consult reviewed  Patient will be treated in therapeutic milieu with appropriate individual and group therapies as described.  PDMP CHECKED     Supportive psychotheraoy provided, roselyn talked about recovery enviroment, relapse prevention, triggers to use.  Discussed with patient many issues of addiction,triggers, relapse, and establishing a solid recovery program.  Asked pt to be med complinat   Medical diagnoses to be addressed this  admission:    Plan:  Assessment & Plan     Paulo Franco is a 45 year old male admitted on 8/23/2021 for recurrent alcohol abuse and was admitted to inpatient psychiatry.  Internal medicine was consulted for comanagement of medical issues     Acute alcohol abuse  Polysubstance abuse  -Continue with MSSA using Valium prn  -Continue with folic acid, thiamine and MV daily  -Home medications include Xanax, Propanolol, Trazodone but have not been ordered yet - will leave this to the discretion of the primary team  -Labs on admission including CBC, BMP, hepatic panel were without any acute abnormalities     History of esophagitis and hematemesis (June 2021) requiring admission  He has no further signs or symtoms of this currently. Continue to monitor symptoms intermittently  -Continue home regimen of Protonix 40mg twice daily  -Continue home regimen of Sucralfate 1g four times daily           Legal Status: voluntary    Safety Assessment:   Checks:  15 min  Precautions: withdrawal precautions  Pt has not required locked seclusion or restraints in the past 24 hours to maintain safety, please refer to RN documentation for further details.  Discussed with patient many issues of addiction,triggers, relapse, and establishing a solid recovery program.  Able to give informed consent:  YES   Discussed Risks/Benefits/Side Effects/Alternatives: YES    After discussion of the indications, risks, benefits, alternatives and consequences of no treatment, the patient elects to complete detox and do trt

## 2021-08-26 NOTE — PROGRESS NOTES
Number of patients attending the group:  10  Group Length:  1 Hour    Group Therapy     Summary of Group / Topics Discussed:    The  Psychotherapy group goal is to promote insight to positive choice and change. Group processing is within a supportive and safe environment. Patients processed emotions using verbal group and expressive psychotherapy interventions.    This group focused on helpful strategies for working with shame/guilt as a way to prevent addictive tendencies and to increase functioning. Pt's identified examples of shame/guilt manifestations and used peer feedback to address ways to prevent relapse.    Assessment: Pt presented with a flat affect. He reported his work is a big stressor for him, and he had taken more than was necessary, which led to him drinking.  Patient Response: Pt participated fully in group. Provided and received feedback from group members.

## 2021-08-26 NOTE — PLAN OF CARE
At the beginning of the shift, staff noticed pt coughing. Writer asked pt about cough, pt reports it is new 1-2weeks. Pt stated that a coworker was sick, and he may have gotten sick from co-worker. Pt reports no other symptoms. Covid test was ordered, and pt was asked to remain in room until test was resulted. Pt was understanding of request and complied. Covid resulted negative. Pt was notified he can leave his room. Pt is calm this evening. Denies SI/SIB    MSSA scores this evenin & 9. 10mg & 5mg of valium given this evening.      Continue to monitor MSSA per protocol.

## 2021-08-26 NOTE — PROGRESS NOTES
Mr. Franco is doing well thus far with no acute issue. He is hemodynamically stable, afebrile with normal labs and continues to receive detox care from his Psychiatry team.     We will sign off at this time. Please do not hesitate to contact us back with any questions or concerns.     HECTOR Chaudhry

## 2021-08-26 NOTE — PLAN OF CARE
Pt continues to be monitored for alcohol withdrawal. MSSA scores this shift 8 and 6. Pt states that his appetite, mood and energy are improving. Pt denies hallucinations this morning. Endorses moderate anxiety which has improved since yesterday. PRN APAP given for headache x1 which was effective.

## 2021-08-26 NOTE — PLAN OF CARE
Patient appeared to be asleep for ... hours during safety checks this shift. MSSA scores were 5 and 4. No Valium administered this shift. Will continue to monitor and update if there are changes.

## 2021-08-26 NOTE — PLAN OF CARE
Patient appeared to be asleep during safety checks this shift. MSSA scores were 5 and 4. Will continue to monitor and update if there are changes.

## 2021-08-27 VITALS
OXYGEN SATURATION: 94 % | WEIGHT: 156 LBS | BODY MASS INDEX: 21.13 KG/M2 | HEART RATE: 109 BPM | RESPIRATION RATE: 16 BRPM | DIASTOLIC BLOOD PRESSURE: 87 MMHG | SYSTOLIC BLOOD PRESSURE: 127 MMHG | TEMPERATURE: 98.5 F | HEIGHT: 72 IN

## 2021-08-27 PROCEDURE — 250N000013 HC RX MED GY IP 250 OP 250 PS 637: Performed by: PSYCHIATRY & NEUROLOGY

## 2021-08-27 PROCEDURE — 99239 HOSP IP/OBS DSCHRG MGMT >30: CPT | Performed by: PSYCHIATRY & NEUROLOGY

## 2021-08-27 RX ORDER — LANOLIN ALCOHOL/MO/W.PET/CERES
100 CREAM (GRAM) TOPICAL DAILY
Qty: 30 TABLET | Refills: 0 | Status: ON HOLD | OUTPATIENT
Start: 2021-08-27 | End: 2021-09-20

## 2021-08-27 RX ORDER — MULTIPLE VITAMINS W/ MINERALS TAB 9MG-400MCG
1 TAB ORAL DAILY
Qty: 30 TABLET | Refills: 0 | Status: ON HOLD | OUTPATIENT
Start: 2021-08-27 | End: 2021-09-20

## 2021-08-27 RX ORDER — SUCRALFATE 1 G/1
1 TABLET ORAL 4 TIMES DAILY
Qty: 120 TABLET | Refills: 0 | Status: ON HOLD | OUTPATIENT
Start: 2021-08-27 | End: 2021-09-20

## 2021-08-27 RX ORDER — PANTOPRAZOLE SODIUM 40 MG/1
40 TABLET, DELAYED RELEASE ORAL 2 TIMES DAILY
Qty: 60 TABLET | Refills: 0 | Status: ON HOLD | OUTPATIENT
Start: 2021-08-27 | End: 2021-09-20

## 2021-08-27 RX ORDER — FOLIC ACID 1 MG/1
1 TABLET ORAL DAILY
Qty: 30 TABLET | Refills: 0 | Status: ON HOLD | OUTPATIENT
Start: 2021-08-27 | End: 2021-09-20

## 2021-08-27 RX ADMIN — FOLIC ACID 1 MG: 1 TABLET ORAL at 07:48

## 2021-08-27 RX ADMIN — THIAMINE HCL TAB 100 MG 100 MG: 100 TAB at 07:48

## 2021-08-27 RX ADMIN — MULTIPLE VITAMINS W/ MINERALS TAB 1 TABLET: TAB at 07:48

## 2021-08-27 ASSESSMENT — ACTIVITIES OF DAILY LIVING (ADL)
LAUNDRY: WITH SUPERVISION
DRESS: INDEPENDENT
HYGIENE/GROOMING: INDEPENDENT
ORAL_HYGIENE: INDEPENDENT

## 2021-08-27 NOTE — PLAN OF CARE
Pt was visible and active in the milieu throughout the evening. Pt attended groups. Affect is bright. Pt hopeful for discharge tomorrow. Pt denies SI/SIB.    MSSA scores this evenin & 6. Pt only symptom is elevated pulse.      Continue to monitor MSSA per protocol.

## 2021-08-27 NOTE — DISCHARGE SUMMARY
Paulo Franco MRN# [id number]   Age: 45 year old YOB: 1975     Date of Admission:  8/23/2021  Date of Discharge:  8/27/2021  Admitting Physician:  Yasir Gonzales MD  Discharge Physician:  Yasir Gonzales MD      DISCHARGE  DX     Alcohol use disorder severe         Event Leading to Hospitalization:     See Admission note by admitting provider for patient encounter. for additional details.          Hospital Course:   PATIENT was admitted to [facility] attending  under DR gonzales, please review the detailed admit note on 8/24/2021   The patient was placed under status 15 (15 minute checks) to ensure patient safety.   MSSA protocol was initiated due to the patient's history of alcohol abuse and concern for withdrawal symptoms.  CBC, BMP and utox obtained.    All outpatient medications were continued    PATIENTdid participate in groups and was visible in the milieu.     The patient's symptoms of alcohol withdrawal improved.     Patients energy motivation , sleep appetite improved.  Pt completed detox . It was un eventful.      Discussed with patient medications for craving.  Spoke with patient about triggers coping skills relapse prevention.    CONSULTS DONE DURING PATIENTS HOSPITALIZATION.  Patient was seen by medicine on date    This as per their medical consult      Assessment & Plan     Paulo Franco is a 45 year old male admitted on 8/23/2021 for recurrent alcohol abuse and was admitted to inpatient psychiatry.  Internal medicine was consulted for comanagement of medical issues     Acute alcohol abuse  Polysubstance abuse  -Continue with MSSA using Valium prn  -Continue with folic acid, thiamine and MV daily  -Home medications include Xanax, Propanolol, Trazodone but have not been ordered yet - will leave this to the discretion of the primary team  -Labs on admission including CBC, BMP, hepatic panel were without any acute abnormalities     History of esophagitis and hematemesis  (June 2021) requiring admission  He has no further signs or symtoms of this currently. Continue to monitor symptoms intermittently  -Continue home regimen of Protonix 40mg twice daily  -Continue home regimen of Sucralfate 1g four times daily             Pt was seen by cm  As per recommendations from cm    Met with Pt for discharge planning.  Pt reports a plan to return to [facility] and sober housing.  Pt provided with phone numbers for program and sober housing list.           Labs:reviewed with patient       Recent Results (from the past 48 hour(s))   Symptomatic COVID-19 Virus (Coronavirus) by PCR Nasopharyngeal    Collection Time: 08/25/21  7:38 PM    Specimen: Nasopharyngeal; Swab   Result Value Ref Range    SARS CoV2 PCR Negative Negative         Recent Results (from the past 240 hour(s))   Alcohol breath test POCT    Collection Time: 08/23/21  7:42 PM   Result Value Ref Range    Alcohol Breath Test 0.291 (A) 0.00 - 0.01   Asymptomatic COVID-19 Virus (Coronavirus) by PCR Nasopharyngeal    Collection Time: 08/23/21 11:24 PM    Specimen: Nasopharyngeal; Swab   Result Value Ref Range    SARS CoV2 PCR Negative Negative   Comprehensive metabolic panel    Collection Time: 08/24/21  7:56 AM   Result Value Ref Range    Sodium 133 133 - 144 mmol/L    Potassium 4.1 3.4 - 5.3 mmol/L    Chloride 97 94 - 109 mmol/L    Carbon Dioxide (CO2) 24 20 - 32 mmol/L    Anion Gap 12 3 - 14 mmol/L    Urea Nitrogen 18 7 - 30 mg/dL    Creatinine 0.67 0.66 - 1.25 mg/dL    Calcium 8.5 8.5 - 10.1 mg/dL    Glucose 86 70 - 99 mg/dL    Alkaline Phosphatase 85 40 - 150 U/L    AST 36 0 - 45 U/L    ALT 32 0 - 70 U/L    Protein Total 8.6 6.8 - 8.8 g/dL    Albumin 3.8 3.4 - 5.0 g/dL    Bilirubin Total 0.8 0.2 - 1.3 mg/dL    GFR Estimate >90 >60 mL/min/1.73m2   CBC with platelets and differential    Collection Time: 08/24/21  7:56 AM   Result Value Ref Range    WBC Count 9.6 4.0 - 11.0 10e3/uL    RBC Count 4.92 4.40 - 5.90 10e6/uL    Hemoglobin  16.1 13.3 - 17.7 g/dL    Hematocrit 45.0 40.0 - 53.0 %    MCV 92 78 - 100 fL    MCH 32.7 26.5 - 33.0 pg    MCHC 35.8 31.5 - 36.5 g/dL    RDW 12.2 10.0 - 15.0 %    Platelet Count 291 150 - 450 10e3/uL    % Neutrophils 67 %    % Lymphocytes 25 %    % Monocytes 8 %    % Eosinophils 0 %    % Basophils 0 %    % Immature Granulocytes 0 %    NRBCs per 100 WBC 0 <1 /100    Absolute Neutrophils 6.4 1.6 - 8.3 10e3/uL    Absolute Lymphocytes 2.4 0.8 - 5.3 10e3/uL    Absolute Monocytes 0.7 0.0 - 1.3 10e3/uL    Absolute Eosinophils 0.0 0.0 - 0.7 10e3/uL    Absolute Basophils 0.0 0.0 - 0.2 10e3/uL    Absolute Immature Granulocytes 0.0 <=0.0 10e3/uL    Absolute NRBCs 0.0 10e3/uL   Drug abuse screen 1 urine (ED)    Collection Time: 08/24/21  9:11 AM   Result Value Ref Range    Amphetamines Urine Screen Negative Screen Negative    Barbiturates Urine Screen Negative Screen Negative    Benzodiazepines Urine Screen Negative Screen Negative    Cannabinoids Urine Screen Negative Screen Negative    Cocaine Urine Screen Negative Screen Negative    Opiates Urine Screen Negative Screen Negative   Symptomatic COVID-19 Virus (Coronavirus) by PCR Nasopharyngeal    Collection Time: 08/25/21  7:38 PM    Specimen: Nasopharyngeal; Swab   Result Value Ref Range    SARS CoV2 PCR Negative Negative            Because this patient meets criteria for an Alcohol Use Disorder, I performed the following brief intervention on the date of this note:              1) Expressed concern that the patient is drinking at unhealthy levels known to increase their risk of alcohol related problems              2) Gave feedback linking alcohol use and health, including personalized feedback explaining how alcohol use can interact with their medical and/or psychiatric problems, and with prescribed medications.              3) Advised patient to abstain.    PT counseled on nicotine cessation and nicotine replacement provided    Discussed with patient many issues of  addiction,triggers, relapse, and establishing a solid recovery program.    DISCHARGE MENTAL STATUS EXAMINATION:  The patient is alert, oriented x3.  Good fund of knowledge.  Good use of language.  Recent and remote memory, language, fund of knowledge are all adequate.  Euthymic mood congruent affect  Speech normal rate/rhythm linear tp no loose asso,The patient does not have any active suicidal or homicidal ideation.  Does not have any auditory or visual hallucination.  Fair insight/judgment At this time, the patient was stable to be discharged.        Pt was not determined to not be a danger to himself or others. At the current time of discharge, the patient does not meet criteria for involuntary hospitalization. On the day of discharge, the patient reports that they do not have suicidal or homicidal ideation and would never hurt themselves or others. Steps taken to minimize risk include: assessing patient s behavior and thought process daily during hospital stay, discharging patient with adequate plan for follow up for mental and physical health and discussing safety plan of returning to the hospital should the patient ever have thoughts of harming themselves or others. Therefore, based on all available evidence including the factors cited above, the patient does not appear to be at imminent risk for self-harm, and is appropriate for outpatient level of care.     Educated about side effects/risk vs benefits /alternative including non treatment.Pt consented to be on medication.     .Total time spent on discharge summary more than 35 min  More than  20 min  planning, coordination of care, medication reconciliation and performance of physical exam on day of discharge.Care was coordinated with unit RN and unit therapist       Paulo Franco Medication Instructions SHAGUFTA:[id number]    Printed on:08/27/21 1006   Medication Information                      folic acid (FOLVITE) 1 MG tablet  Take 1 tablet (1 mg) by  "mouth daily             multivitamin w/minerals (THERA-VIT-M) tablet  Take 1 tablet by mouth daily             pantoprazole (PROTONIX) 40 MG EC tablet  Take 1 tablet (40 mg) by mouth 2 times daily             sucralfate (CARAFATE) 1 GM tablet  Take 1 tablet (1 g) by mouth 4 times daily             thiamine (B-1) 100 MG tablet  Take 1 tablet (100 mg) by mouth daily                 Disposition: [facility] and sober housing     Facts about COVID19 at www.cdc.gov/COVID19 and www.[place].gov/covid19     Keeping hands clean is one of the most important steps we can take to avoid getting sick and spreading germs to others.  Please wash your hands frequently and lather with soap for at least 20 seconds!     Medical Follow-Up: Patient was asked to make a primary care appointment in 1 week after being discharged         .        \"Much or all of the text in this note was generated through the use of Dragon Dictate voice to text software. Errors in spelling or words which appear to be out of contact are unintentional, may be present due having escaped editing\"     "

## 2021-08-27 NOTE — PLAN OF CARE
Pt to be discharged to sober living home with plans to resume [facility] outpatient CD treatment program. AVS and labs reviewed with patient, all questions answered and copies sent with patient. Pt discharged with all medications and belongings.

## 2021-08-27 NOTE — PROGRESS NOTES
Pt.was monitored overnight for alcohol withdrawal symptoms. He scored 5 on MSSA. Did not meet the ordered parameter for prn valium administration. Will continue to monitor.

## 2021-09-15 ENCOUNTER — HOSPITAL ENCOUNTER (INPATIENT)
Facility: CLINIC | Age: 46
LOS: 4 days | Discharge: PSYCHIATRIC OR SUBSTANCE USE FACILITY | End: 2021-09-20
Attending: EMERGENCY MEDICINE | Admitting: PSYCHIATRY & NEUROLOGY
Payer: COMMERCIAL

## 2021-09-15 DIAGNOSIS — F10.120 ALCOHOL ABUSE WITH UNCOMPLICATED INTOXICATION (H): ICD-10-CM

## 2021-09-15 DIAGNOSIS — F10.930 ALCOHOL WITHDRAWAL SYNDROME WITHOUT COMPLICATION (H): ICD-10-CM

## 2021-09-15 DIAGNOSIS — Z11.52 ENCOUNTER FOR SCREENING LABORATORY TESTING FOR SEVERE ACUTE RESPIRATORY SYNDROME CORONAVIRUS 2 (SARS-COV-2): ICD-10-CM

## 2021-09-15 DIAGNOSIS — F10.220 ACUTE ALCOHOLIC INTOXICATION IN ALCOHOLISM WITHOUT COMPLICATION (H): Primary | ICD-10-CM

## 2021-09-15 DIAGNOSIS — F10.10 ALCOHOL ABUSE: ICD-10-CM

## 2021-09-15 LAB
ALBUMIN SERPL-MCNC: 3.7 G/DL (ref 3.4–5)
ALCOHOL BREATH TEST: 0.28 (ref 0–0.01)
ALP SERPL-CCNC: 91 U/L (ref 40–150)
ALT SERPL W P-5'-P-CCNC: 43 U/L (ref 0–70)
AMPHETAMINES UR QL SCN: ABNORMAL
ANION GAP SERPL CALCULATED.3IONS-SCNC: 8 MMOL/L (ref 3–14)
AST SERPL W P-5'-P-CCNC: 50 U/L (ref 0–45)
BARBITURATES UR QL: ABNORMAL
BASOPHILS # BLD AUTO: 0 10E3/UL (ref 0–0.2)
BASOPHILS NFR BLD AUTO: 0 %
BENZODIAZ UR QL: ABNORMAL
BILIRUB SERPL-MCNC: 0.5 MG/DL (ref 0.2–1.3)
BUN SERPL-MCNC: 9 MG/DL (ref 7–30)
CALCIUM SERPL-MCNC: 8.1 MG/DL (ref 8.5–10.1)
CANNABINOIDS UR QL SCN: ABNORMAL
CHLORIDE BLD-SCNC: 100 MMOL/L (ref 94–109)
CO2 SERPL-SCNC: 30 MMOL/L (ref 20–32)
COCAINE UR QL: ABNORMAL
CREAT SERPL-MCNC: 0.7 MG/DL (ref 0.66–1.25)
EOSINOPHIL # BLD AUTO: 0.1 10E3/UL (ref 0–0.7)
EOSINOPHIL NFR BLD AUTO: 1 %
ERYTHROCYTE [DISTWIDTH] IN BLOOD BY AUTOMATED COUNT: 13.2 % (ref 10–15)
ETHANOL SERPL-MCNC: 0.27 G/DL
GFR SERPL CREATININE-BSD FRML MDRD: >90 ML/MIN/1.73M2
GLUCOSE BLD-MCNC: 99 MG/DL (ref 70–99)
HCT VFR BLD AUTO: 39.7 % (ref 40–53)
HGB BLD-MCNC: 13.9 G/DL (ref 13.3–17.7)
IMM GRANULOCYTES # BLD: 0 10E3/UL
IMM GRANULOCYTES NFR BLD: 0 %
LYMPHOCYTES # BLD AUTO: 2.7 10E3/UL (ref 0.8–5.3)
LYMPHOCYTES NFR BLD AUTO: 42 %
MCH RBC QN AUTO: 32.4 PG (ref 26.5–33)
MCHC RBC AUTO-ENTMCNC: 35 G/DL (ref 31.5–36.5)
MCV RBC AUTO: 93 FL (ref 78–100)
MONOCYTES # BLD AUTO: 0.5 10E3/UL (ref 0–1.3)
MONOCYTES NFR BLD AUTO: 7 %
NEUTROPHILS # BLD AUTO: 3.2 10E3/UL (ref 1.6–8.3)
NEUTROPHILS NFR BLD AUTO: 50 %
NRBC # BLD AUTO: 0 10E3/UL
NRBC BLD AUTO-RTO: 0 /100
OPIATES UR QL SCN: ABNORMAL
PLATELET # BLD AUTO: 228 10E3/UL (ref 150–450)
POTASSIUM BLD-SCNC: 3.4 MMOL/L (ref 3.4–5.3)
PROT SERPL-MCNC: 7.7 G/DL (ref 6.8–8.8)
RBC # BLD AUTO: 4.29 10E6/UL (ref 4.4–5.9)
SARS-COV-2 RNA RESP QL NAA+PROBE: NEGATIVE
SODIUM SERPL-SCNC: 138 MMOL/L (ref 133–144)
WBC # BLD AUTO: 6.5 10E3/UL (ref 4–11)

## 2021-09-15 PROCEDURE — 80307 DRUG TEST PRSMV CHEM ANLYZR: CPT | Performed by: EMERGENCY MEDICINE

## 2021-09-15 PROCEDURE — 99284 EMERGENCY DEPT VISIT MOD MDM: CPT | Performed by: EMERGENCY MEDICINE

## 2021-09-15 PROCEDURE — C9803 HOPD COVID-19 SPEC COLLECT: HCPCS | Performed by: EMERGENCY MEDICINE

## 2021-09-15 PROCEDURE — 82977 ASSAY OF GGT: CPT | Performed by: PSYCHIATRY & NEUROLOGY

## 2021-09-15 PROCEDURE — 82075 ASSAY OF BREATH ETHANOL: CPT | Performed by: EMERGENCY MEDICINE

## 2021-09-15 PROCEDURE — 99285 EMERGENCY DEPT VISIT HI MDM: CPT | Performed by: EMERGENCY MEDICINE

## 2021-09-15 PROCEDURE — 82077 ASSAY SPEC XCP UR&BREATH IA: CPT | Performed by: EMERGENCY MEDICINE

## 2021-09-15 PROCEDURE — 85025 COMPLETE CBC W/AUTO DIFF WBC: CPT | Performed by: EMERGENCY MEDICINE

## 2021-09-15 PROCEDURE — 84443 ASSAY THYROID STIM HORMONE: CPT | Performed by: PSYCHIATRY & NEUROLOGY

## 2021-09-15 PROCEDURE — U0003 INFECTIOUS AGENT DETECTION BY NUCLEIC ACID (DNA OR RNA); SEVERE ACUTE RESPIRATORY SYNDROME CORONAVIRUS 2 (SARS-COV-2) (CORONAVIRUS DISEASE [COVID-19]), AMPLIFIED PROBE TECHNIQUE, MAKING USE OF HIGH THROUGHPUT TECHNOLOGIES AS DESCRIBED BY CMS-2020-01-R: HCPCS | Performed by: EMERGENCY MEDICINE

## 2021-09-15 PROCEDURE — 36415 COLL VENOUS BLD VENIPUNCTURE: CPT | Performed by: EMERGENCY MEDICINE

## 2021-09-15 PROCEDURE — 250N000013 HC RX MED GY IP 250 OP 250 PS 637: Performed by: EMERGENCY MEDICINE

## 2021-09-15 PROCEDURE — 80053 COMPREHEN METABOLIC PANEL: CPT | Performed by: EMERGENCY MEDICINE

## 2021-09-15 RX ORDER — HYDROXYZINE HYDROCHLORIDE 25 MG/1
25 TABLET, FILM COATED ORAL ONCE
Status: COMPLETED | OUTPATIENT
Start: 2021-09-15 | End: 2021-09-15

## 2021-09-15 RX ADMIN — HYDROXYZINE HYDROCHLORIDE 25 MG: 25 TABLET, FILM COATED ORAL at 20:47

## 2021-09-16 PROBLEM — F10.10 ALCOHOL ABUSE: Status: ACTIVE | Noted: 2021-09-16

## 2021-09-16 LAB
ALBUMIN UR-MCNC: NEGATIVE MG/DL
APPEARANCE UR: CLEAR
BILIRUB UR QL STRIP: NEGATIVE
COLOR UR AUTO: ABNORMAL
GGT SERPL-CCNC: 49 U/L (ref 0–75)
GLUCOSE UR STRIP-MCNC: NEGATIVE MG/DL
HGB UR QL STRIP: NEGATIVE
KETONES UR STRIP-MCNC: NEGATIVE MG/DL
LEUKOCYTE ESTERASE UR QL STRIP: NEGATIVE
NITRATE UR QL: NEGATIVE
PH UR STRIP: 8 [PH] (ref 5–7)
RBC URINE: <1 /HPF
SP GR UR STRIP: 1.01 (ref 1–1.03)
TSH SERPL DL<=0.005 MIU/L-ACNC: 2.5 MU/L (ref 0.4–4)
UROBILINOGEN UR STRIP-MCNC: NORMAL MG/DL
WBC URINE: 2 /HPF

## 2021-09-16 PROCEDURE — 81001 URINALYSIS AUTO W/SCOPE: CPT | Performed by: PSYCHIATRY & NEUROLOGY

## 2021-09-16 PROCEDURE — 250N000013 HC RX MED GY IP 250 OP 250 PS 637: Performed by: EMERGENCY MEDICINE

## 2021-09-16 PROCEDURE — 99223 1ST HOSP IP/OBS HIGH 75: CPT | Mod: AI | Performed by: PSYCHIATRY & NEUROLOGY

## 2021-09-16 PROCEDURE — 250N000013 HC RX MED GY IP 250 OP 250 PS 637: Performed by: PSYCHIATRY & NEUROLOGY

## 2021-09-16 PROCEDURE — 128N000004 HC R&B CD ADULT

## 2021-09-16 PROCEDURE — H0001 ALCOHOL AND/OR DRUG ASSESS: HCPCS | Performed by: COUNSELOR

## 2021-09-16 PROCEDURE — HZ2ZZZZ DETOXIFICATION SERVICES FOR SUBSTANCE ABUSE TREATMENT: ICD-10-PCS | Performed by: PSYCHIATRY & NEUROLOGY

## 2021-09-16 RX ORDER — PANTOPRAZOLE SODIUM 40 MG/1
40 TABLET, DELAYED RELEASE ORAL 2 TIMES DAILY
Status: DISCONTINUED | OUTPATIENT
Start: 2021-09-16 | End: 2021-09-20 | Stop reason: HOSPADM

## 2021-09-16 RX ORDER — MULTIPLE VITAMINS W/ MINERALS TAB 9MG-400MCG
1 TAB ORAL DAILY
Status: DISCONTINUED | OUTPATIENT
Start: 2021-09-16 | End: 2021-09-20 | Stop reason: HOSPADM

## 2021-09-16 RX ORDER — SUCRALFATE 1 G/1
1 TABLET ORAL 4 TIMES DAILY
Status: DISCONTINUED | OUTPATIENT
Start: 2021-09-16 | End: 2021-09-20 | Stop reason: HOSPADM

## 2021-09-16 RX ORDER — ATENOLOL 50 MG/1
50 TABLET ORAL DAILY PRN
Status: DISCONTINUED | OUTPATIENT
Start: 2021-09-16 | End: 2021-09-20 | Stop reason: HOSPADM

## 2021-09-16 RX ORDER — DIAZEPAM 5 MG
5-20 TABLET ORAL EVERY 30 MIN PRN
Status: DISCONTINUED | OUTPATIENT
Start: 2021-09-16 | End: 2021-09-16

## 2021-09-16 RX ORDER — FOLIC ACID 1 MG/1
1 TABLET ORAL DAILY
Status: DISCONTINUED | OUTPATIENT
Start: 2021-09-16 | End: 2021-09-20 | Stop reason: HOSPADM

## 2021-09-16 RX ORDER — LANOLIN ALCOHOL/MO/W.PET/CERES
100 CREAM (GRAM) TOPICAL DAILY
Status: DISCONTINUED | OUTPATIENT
Start: 2021-09-16 | End: 2021-09-20 | Stop reason: HOSPADM

## 2021-09-16 RX ORDER — DIAZEPAM 5 MG
5-20 TABLET ORAL EVERY 30 MIN PRN
Status: DISCONTINUED | OUTPATIENT
Start: 2021-09-16 | End: 2021-09-20 | Stop reason: HOSPADM

## 2021-09-16 RX ADMIN — DIAZEPAM 10 MG: 5 TABLET ORAL at 02:58

## 2021-09-16 RX ADMIN — DIAZEPAM 10 MG: 5 TABLET ORAL at 07:06

## 2021-09-16 RX ADMIN — FOLIC ACID 1 MG: 1 TABLET ORAL at 08:20

## 2021-09-16 RX ADMIN — SUCRALFATE 1 G: 1 TABLET ORAL at 11:13

## 2021-09-16 RX ADMIN — SUCRALFATE 1 G: 1 TABLET ORAL at 16:30

## 2021-09-16 RX ADMIN — DIAZEPAM 10 MG: 5 TABLET ORAL at 08:25

## 2021-09-16 RX ADMIN — DIAZEPAM 10 MG: 5 TABLET ORAL at 00:51

## 2021-09-16 RX ADMIN — THIAMINE HCL TAB 100 MG 100 MG: 100 TAB at 08:20

## 2021-09-16 RX ADMIN — DIAZEPAM 5 MG: 5 TABLET ORAL at 20:25

## 2021-09-16 RX ADMIN — PANTOPRAZOLE SODIUM 40 MG: 40 TABLET, DELAYED RELEASE ORAL at 08:20

## 2021-09-16 RX ADMIN — SUCRALFATE 1 G: 1 TABLET ORAL at 20:25

## 2021-09-16 RX ADMIN — PANTOPRAZOLE SODIUM 40 MG: 40 TABLET, DELAYED RELEASE ORAL at 20:25

## 2021-09-16 RX ADMIN — ATENOLOL 50 MG: 50 TABLET ORAL at 09:04

## 2021-09-16 RX ADMIN — DIAZEPAM 10 MG: 5 TABLET ORAL at 12:18

## 2021-09-16 RX ADMIN — MULTIPLE VITAMINS W/ MINERALS TAB 1 TABLET: TAB at 08:20

## 2021-09-16 RX ADMIN — SUCRALFATE 1 G: 1 TABLET ORAL at 08:20

## 2021-09-16 ASSESSMENT — ACTIVITIES OF DAILY LIVING (ADL)
DOING_ERRANDS_INDEPENDENTLY_DIFFICULTY: NO
DRESS: INDEPENDENT;SCRUBS (BEHAVIORAL HEALTH)
TOILETING_ISSUES: NO
ORAL_HYGIENE: INDEPENDENT
LAUNDRY: WITH SUPERVISION
HEARING_DIFFICULTY_OR_DEAF: NO
FALL_HISTORY_WITHIN_LAST_SIX_MONTHS: NO
DIFFICULTY_EATING/SWALLOWING: NO
DIFFICULTY_COMMUNICATING: NO
CONCENTRATING,_REMEMBERING_OR_MAKING_DECISIONS_DIFFICULTY: NO
HYGIENE/GROOMING: INDEPENDENT
WALKING_OR_CLIMBING_STAIRS_DIFFICULTY: NO
WEAR_GLASSES_OR_BLIND: NO
DRESSING/BATHING_DIFFICULTY: NO

## 2021-09-16 NOTE — H&P
Paulo Franco is a 45 year old male      History was provided by JEFFERY who was a fair historian.   CHIEF COMPLAINT:    relapse  HISTORY OF PRESENT ILLNESS:      Paulo Franco is a 45 year old male with a past medical history of Lyme disease, alcohol dependence.  Patient is known to me from his previous admissions  He was under my care 8/23/2021 2 8/27/2021.  During that withdrawal  Hcomplicted withdrawal due to elevated bp..  His plan was to go to [facility] and sober living.  He reports he was sober until Labor Day weekend.  He had some relationship issues and he relapsed to drinking alcohol  Pt reports his second relapse and he believes he needs to do higher level of care.  Patient has been drinking a liter of hard liquor daily.  Patient has history of withdrawal seizures.  He is experiencing hallucinations hearing muffled voices and seeing tracers he realized he needs to get help and brought himself to the emergency room by calling 911  Patient has been using the following substances: Alcohol  Started at age12 , became a problem at 20's     Patient has tolerance, withdrawal, progressive use, loss of control, spending more time and more amount than intended. Patient has made attempts to quit, is experiencing cravings, and reports negative consequences.                    Patient does have a history of seizures.  Patient does not have a history of delirium tremens.complains of having having visual hallucinations.  Seeing tracers also hearing  Muffled voices                     Denies thoughts of suicide or harming others.       Denies auditory or visual hallucinations.      Patient smokes 0 cigarettes     Patient denied any gambling     Substance Age first use First became regular or problematic Most recent use             Cannabis  none       Cocaine NONE       Stimulants NONE       Opioids NONE       Sedatives NONE       Hallucinogens NONE       Inhalants NONE       Other         OTC drugs NONE       Nicotine             Patient does not have a history of overdose.  Patient does not have a history of IV use.  Patient does not have a history of hepatitis, HIV,  PSYCHIATRIC REVIEW OF SYSTEMS:          Psychiatric Review of Systems:   Depression:   Denied depressed mood, suicidal ideation, decreased interest, changes in sleep, changes in appetite, guilt, hopelessness, helplessness, impaired concentration, decreased energy, irritability.   Denies: depressed mood, suicidal ideation, decreased interest, changes in sleep, changes in appetite, guilt, hopelessness, helplessness, impaired concentration, decreased energy, irritability.  Susan:   denied sleeplessness, impulsiveness, racing thoughts, increased goal-directed activities, pressured speech, increase in energy  Susan Feeling euphoric,Distractible,Impulsive,Grandiose,Talking excessively,Have energy without sleeping,Mood swings,Irritability  Denies: sleeplessness, increased goal-directed activities, abrupt increase in energy, pressured speech  Psychosis:   Reports: +visual hallucinations, +auditory hallucinations in the context of withdrawl,  Anxiety: anxious feels its 10  feels his pulse is up,head rush      Pt has following s/o of anxiety  No Shortness of breath,Rapid heart rate,no Sweaty,shakey Shortness of breath,Butterflies in the stomach,  , nausea    Denies: worries that are difficult to control for the past 6 months, panic attacks  PTSD:     Denies: re-experiencing past trauma, nightmares, increased arousal, avoidance of traumatic stimuli, impaired function.  OCD:     Denies: obsessions, checking, symmetry, cleaning, skin picking.  ED:     Denies: restriction, binging, purging.     Denied Symptoms of attention deficit disorder include a failure to pay attention to detail, a pattern of careless mistakes, a pattern of inattentive listening, a failure to follow through with projects, poor personal organization, losing necessary objects, distractibility,  forgetfulness.     deniedSymptoms of borderline personality disorder include a fear of abandonment, unstable self-image, impulsive behavior, dissociative feeling, intense anger, unstable personal relationships, chronic feelings of boredom, periods of intense depressed mood.                  PSYCHIATRIC HISTORY                     Past court commitments: none  SIB /SUICIDE ATTEMPTS NONE  Psych Hosp :none  Outpatient Programs none  Inpatient cd trt 13   Out pt cd trt none           SOCIAL HISTORY                                                                         Lives in a sober house, unemployed           Family History:   FAMILY HISTORY:         Family History   Problem Relation Age of Onset     Polycystic Kidney Diease Mother       Substance Abuse Mother       Depression Mother       Heart Defect Father       Heart Defect Brother        Family Mental Health History-  Mother has depression     Substance Use Problems - present for mother , mgf           Medical h/o   A 10-point review of systems is reviewed and is negative except for psychiatric symptoms above.       Allergies reviewed  Blood pressure (!) 142/91, pulse 102, temperature 98.4  F (36.9  C), temperature source Temporal, resp. rate 16, weight 70.8 kg (156 lb), SpO2 94 %.      vitals  Appearance:  awake, alert, appeared as age stated, adequate groomed and slightly unkempt  Attitude:  cooperative  Eye Contact:  good  Mood:   Anxious  Affect:  congruent   Speech:  clear, coherent normal rate   Psychomotor Behavior:  no evidence of tardive dyskinesia, dystonia, or tics  Thought Process:  logical, linear and goal oriented  Associations:  no loose associations  Thought Content:  no evidence of psychotic thought and active suicidal ideation present  Denied any active suicidal /homicidation ideation plan intent   Insight:  fair  Judgment:  fair  Oriented to:  time, person, and place  Attention Span and Concentration:  intact  Recent and Remote Memory:   intact  Language:  english with appropriate syntax and vocabulary  Fund of Knowledge: appropriate  Muscle Strength and Tone: normal  Gait and Station: Normal    Assessment:         Patient has a biological predisposition with family history positive for alcoholism  Psychologically patient is experiencing alcohol withdrawal patient was endorsing rated visual hallucinations patient has these particular stressors relapse prone relationships job  Patient has chronic illness exacerbation leading to hospitalization progression as described.     Patient has been unable to stop using drugs in the community due to both physical and psychological symptoms.  Continued use will put the patient at risk for medical and/or psychiatric complications.        Inpatient psychiatric hospitalization is warranted at this time for safety, stabilization, and possible adjustment in medications.        Patient has severe exacerbation of chronic alcoholism  ,  been unable to stop using  in the community due to both physical and psychological symptoms.  Continued use will put the patient at risk for medical and/or psychiatric complications.     Diagnosis  Alcohol use disorder severe  Alcohol withdrawal severe      Plan    Patient will detox of alcoholism [facility] protocol on Valium patient's blood pressure is 138/96 pulse is 119 eating disturbance tremor hallucinations agitation sweats  He scored 15 received 20 mg of diazepam since admission is required 40 mg of diazepam    AST elevated 50 most likely from alcoholism    I HAVE REVIEWED LABS WITH PT AND TALKED ABOUT RESULTS WITH PT  I HAVE REVIEWED AND SUMMARIZED OLD RECORDS including his medication reconcilation of his home medications  and PDMP   I HAVE SPOKEN WITH RN ABOUT MEDICATIONS AND withdrawl SCORES  I HAVE SPOKEN WITH CM ABOUT PTS TREATMETN OPTIONS

## 2021-09-16 NOTE — PROGRESS NOTES
[place] Prescription Drug Control Monitoring Note:      NARX SCORES  Narcotic  000  Sedative  000  Stimulant  000  Explanation and GuidanceOVERDOSE RISK SCORE 000  (Range 000-999)  Explanation and Guidance  ADDITIONAL RISK INDICATORS (0)  Explanation and GuidanceThis NarxCare report is based on search criteria supplied and the data entered by the dispensing pharmacy. For more information about any prescription, please contact the dispensing pharmacy or the prescriber. NarxCare scores and reports are intended to aid, not replace, medical decision making. None of the information presented should be used as sole justification for providing or refusing to provide medications. The information on this report is not warranted as accurate or complete.  Graphs  RX GRAPH   Narcotic  Buprenorphine  Sedative  Stimulant  Other  Prescribers  1 - Donaldo Trevinodr  Timeline  09/16  2m  6m  1y  2y  Buprenorphine mg  16  4  0  28  Timeline  09/16  2m  6m  1y  2y  Morphine MgEq (MME)  200  80  0  320  Timeline  09/16  2m  6m  1y  2y  Lorazepam MgEq (LME)  10  2  0  18  Timeline  09/16  2m  6m  1y  2y  *Per CDC guidance, the MME conversion factors prescribed or provided as part of the medication-assisted treatment for opioid use disorder should not be used to benchmark against dosage thresholds meant for opioids prescribed for pain. Buprenorphine products have no agreed upon morphine equivalency, and as partial opioid agonists, are not expected to be associated with overdose risk in the same dose-dependent manner as doses for full agonist opioids. MME = morphine milligram equivalents. LME = Lorazepam milligram equivalents. mg = dose in milligrams.  Summary  Summary  Total Prescriptions:  1  Total Prescribers:  1  Total Pharmacies:  1  Narcotics* (excluding Buprenorphine)  Current Qty:  0  Current MME/day:  0.00  30 Day Avg MME/day:  0.00  Sedatives*  Current Qty:  0  Current LME/day:  0.00  30 Day Avg  LME/day:  0.00  Buprenorphine*  Current Qty:  0  Current mg/day:  0.00  30 Day Avg mg/day:  0.00  Rx Data  PRESCRIPTIONS  Total Prescriptions: 1  Total Private Pay: 0  Fill Date ID Written Sold Drug Qty Days Prescriber Rx # Pharmacy Refill Daily Dose * Pymt Type   05/19/2021 1 05/19/2021 05/19/2021 Gabapentin 300 Mg Capsule  6.00 4  Qad [id number] All (4112) 0/0  Comm Ins [place]  *Per CDC guidance, the MME conversion factors prescribed or provided as part of the medication-assisted treatment for opioid use disorder should not be used to benchmark against dosage thresholds meant for opioids prescribed for pain. Buprenorphine products have no agreed upon morphine equivalency, and as partial opioid agonists, are not expected to be associated with overdose risk in the same dose-dependent manner as doses for full agonist opioids. MME = morphine milligram equivalents. LME = Lorazepam milligram equivalents. mg = dose in milligrams.  Providers  Total Providers: 1  Name Address [facility] Zipcode Phone  Donaldo Trevinodri [address] [place] [address] [facility] [place] [facility] [phone]  Pharmacies  Total Pharmacies: 1  Name Address [facility] Zipcode Phone  [facility] [phone] [place] [address] [facility] [place] [facility] [address] [phone]  The report provided is based upon the search criteria entered and the corresponding data as it has been reported by dispenser(s). If erroneous information is identified or additional information is needed, please contact the dispenser or the prescriber provided on the report. Date Sold signifies the date the prescription was sold (left the pharmacy). The absence of Date Sold does not necessarily indicate the prescription was not dispensed. Fill Date represents the date the medication was filled or prepared by the pharmacy. Note, federal regulation (CFR Title 42: Part 2) requires patient consent prior to releasing certain patient data from federally funded opioid  treatment programs (OTPs). As such, controlled substances dispensed from OTPs for medication-assisted treatment may not appear in the [place]  report. Morphine milligram equivalent (MME) conversion factors published by the CDC are used in the MME calculation. Per the CDC, the MME conversion factor is intended only for analytic purposes where prescription data are used to retrospectively calculate daily MME to inform analyses of risks associated with opioid prescribing. This value does not constitute clinical guidance or recommendations for converting patients from one form of opioid analgesic to another. Per the CDC, the conversion factors for drugs prescribed or provided, as part of medication-assisted treatment for opioid use disorder should not be used to benchmark against MME dosage thresholds meant for opioids prescribed for pain. Buprenorphine products listed in the CDC s MME file do not have an associated conversion factor. Lastly, the CDC notes, in clinical practice, calculating MME for methadone often involves a sliding-scale approach, whereby the conversion factor increases with increasing dose. The conversion factor of 3 for methadone presented in this file could underestimate MME for a given patient. This report contains confidential information, including patient identifiers, and is not a public record. The information on this report must be treated as protected health information and is only to be disclosed to others as authorized by applicable state and Federal regulations.

## 2021-09-16 NOTE — ED TRIAGE NOTES
Pt. here for detox.  Last drink 4 hours ago.  States feeling shaky and having visual hallucinations.  Pt. states history  of withdrawal seizures 2 years ago.

## 2021-09-16 NOTE — PROGRESS NOTES
Met with pt to discuss aftercare. Pt reports he is willing to go [id number] to treatment. Writer discussed with pt the [facility] program and pt is agreeable for referral to this program. Reports he will need CD assessment for treatment referral as he has previously been in IOP with Agusto for several months and just been getting updates to his assessment. Pt was shown paperwork to complete for assessment and referral, reports he will work on this and turn into RN when complete. Pt has [employer] for insurance, AVS initiated.    Update: Pt completed CD assessment, will transfer to [facility] when bed is available and pt is out-of-detox.

## 2021-09-16 NOTE — PLAN OF CARE
Assessment:  Paulo was up ad iraida, spent most of the am in his room in bed. Pt's MSSA scores this am were 13, 15. Pt had elevated pulses of 139, 122. Pt received PRN Atenolol in addition to Valium 10 mg. By 11:16 am, Pts MSSA score was 12 with pulse =86.    Pt denies having suicidal ideation or self harm thoughts. Antonio reports he has been feeling very anxious.     Antonio reported he started feeling somewhat better by 12:00 pm. Pt's appetite was very good at lunch time =100%, he's denying having nausea and he is regularly drinking fluids. This afternoon, Antonio has been in the lounge watching video programming with peers.

## 2021-09-16 NOTE — ED PROVIDER NOTES
History     Chief Complaint   Patient presents with     Alcohol Intoxication     HPI  Paulo Franco is a 45 year old male with a past medical history of Lyme disease, alcohol dependence who presents to the emergency department seeking detox placement.  He reports his last drink was about 4 hours prior to arrival.  He has been drinking a liter of hard liquor per day for the past 4 to 5 days.  He reports he is feeling shaky and having visual and auditory hallucinations at this time.  He is able to ambulate, but somewhat unsteady on his feet.  He states he does have a history of withdrawal seizures 2 years ago.  Does not report history of DTs.  Denies cigarette or other drug use.  No suicidal or homicidal ideation reported.  He states he is interested in inpatient treatment after he is done with detox.    I have reviewed the Medications, Allergies, Past Medical and Surgical History, and Social History in the [facility] system.    Past Medical History:   Diagnosis Date     Concussion      Substance abuse (H)     Done with treatment on 7/16/2018     Past Surgical History:   Procedure Laterality Date     HAND SURGERY Left      No current facility-administered medications for this encounter.     Current Outpatient Medications   Medication     folic acid (FOLVITE) 1 MG tablet     multivitamin w/minerals (THERA-VIT-M) tablet     pantoprazole (PROTONIX) 40 MG EC tablet     sucralfate (CARAFATE) 1 GM tablet     thiamine (B-1) 100 MG tablet     No Known Allergies  Past medical history, past surgical history, medications, and allergies were reviewed with the patient. Additional pertinent items: None    Social History     Socioeconomic History     Marital status: Single     Spouse name: Not on file     Number of children: Not on file     Years of education: Not on file     Highest education level: Not on file   Occupational History     Not on file   Tobacco Use     Smoking status: Never Smoker     Smokeless tobacco: Never Used    Substance and Sexual Activity     Alcohol use: Yes     Comment: 1-2 liters of Vodka per day, last drank 4 hours ago     Drug use: No     Sexual activity: Yes     Partners: Female     Birth control/protection: None   Other Topics Concern     Parent/sibling w/ CABG, MI or angioplasty before 65F 55M? Not Asked   Social History Narrative     Not on file     Social Determinants of Health     Financial Resource Strain:      Difficulty of Paying Living Expenses:    Food Insecurity:      Worried About Running Out of Food in the Last Year:      Ran Out of Food in the Last Year:    Transportation Needs:      Lack of Transportation (Medical):      Lack of Transportation (Non-Medical):    Physical Activity:      Days of Exercise per Week:      Minutes of Exercise per Session:    Stress:      Feeling of Stress :    Social Connections:      Frequency of Communication with Friends and Family:      Frequency of Social Gatherings with Friends and Family:      Attends Baptism Services:      Active Member of Clubs or Organizations:      Attends Club or Organization Meetings:      Marital Status:    Intimate Partner Violence:      Fear of Current or Ex-Partner:      Emotionally Abused:      Physically Abused:      Sexually Abused:      Social history was reviewed with the patient. Additional pertinent items: None    Review of Systems  General: No fevers or chills  Skin: No rash or diaphoresis  Eyes: No eye redness or discharge  Ears/Nose/Throat: No rhinorrhea or nasal congestion  Respiratory: No cough or SOB  Cardiovascular: No chest pain or palpitations  Gastrointestinal: No nausea, vomiting, or diarrhea  Genitourinary: No urinary frequency, hematuria, or dysuria  Musculoskeletal: No arthralgias or myalgias  Neurologic: No numbness or weakness  Psychiatric: See HPI  Hematologic/Lymphatic/Immunologic: No leg swelling, no easy bruising/bleeding  Endocrine: No polyuria/polydypsia    A complete review of systems was performed with  pertinent positives and negatives noted in the HPI, and all other systems negative.    Physical Exam   BP: 129/73  Pulse: 119  Temp: 98.3  F (36.8  C)  Resp: 16  Weight: 70.8 kg (156 lb)  SpO2: 95 %      General: Well nourished, well developed, NAD  HEENT: EOMI, anicteric. NCAT, MMM  Neck: no jugular venous distension, supple, nl ROM  Cardiac: tachycardic rate, extremities well perfused  Pulm: airway patent, NLB  Skin: Warm and dry to the touch.  No rash  Extremities: No LE edema, no cyanosis, w/w/p  Neuro: Alert, slurred speech c/w ETOH intoxication, slightly unsteady gait, but able to am,bulate independetnly    ED Course        Procedures                          Labs Ordered and Resulted from Time of ED Arrival Up to the Time of Departure from the ED   COMPREHENSIVE METABOLIC PANEL - Abnormal; Notable for the following components:       Result Value    Calcium 8.1 (*)     AST 50 (*)     All other components within normal limits   ETHYL ALCOHOL LEVEL - Abnormal; Notable for the following components:    Alcohol ethyl 0.27 (*)     All other components within normal limits   DRUG ABUSE SCREEN 1 URINE (ED) - Abnormal; Notable for the following components:    Benzodiazepines Urine Screen Positive (*)     All other components within normal limits   CBC WITH PLATELETS AND DIFFERENTIAL - Abnormal; Notable for the following components:    RBC Count 4.29 (*)     Hematocrit 39.7 (*)     All other components within normal limits   ALCOHOL BREATH TEST POCT - Abnormal; Notable for the following components:    Alcohol Breath Test 0.276 (*)     All other components within normal limits   COVID-19 VIRUS (CORONAVIRUS) BY PCR   URINE DRUGS OF ABUSE SCREEN    Narrative:     The following orders were created for panel order Urine Drugs of Abuse Screen.  Procedure                               Abnormality         Status                     ---------                               -----------         ------                     Drug abuse  screen 1 urin...[[id number]]  Abnormal            Final result                 Please view results for these tests on the individual orders.   CBC WITH PLATELETS & DIFFERENTIAL    Narrative:     The following orders were created for panel order CBC with platelets differential.  Procedure                               Abnormality         Status                     ---------                               -----------         ------                     CBC with platelets and d...[[id number]]  Abnormal            Final result                 Please view results for these tests on the individual orders.            Results for orders placed or performed during the hospital encounter of 09/15/21 (from the past 24 hour(s))   Alcohol breath test POCT   Result Value Ref Range    Alcohol Breath Test 0.276 (A) 0.00 - 0.01   Urine Drugs of Abuse Screen    Narrative    The following orders were created for panel order Urine Drugs of Abuse Screen.  Procedure                               Abnormality         Status                     ---------                               -----------         ------                     Drug abuse screen 1 urin...[[id number]]  Abnormal            Final result                 Please view results for these tests on the individual orders.   Drug abuse screen 1 urine (ED)   Result Value Ref Range    Amphetamines Urine Screen Negative Screen Negative    Barbiturates Urine Screen Negative Screen Negative    Benzodiazepines Urine Screen Positive (A) Screen Negative    Cannabinoids Urine Screen Negative Screen Negative    Cocaine Urine Screen Negative Screen Negative    Opiates Urine Screen Negative Screen Negative   CBC with platelets differential    Narrative    The following orders were created for panel order CBC with platelets differential.  Procedure                               Abnormality         Status                     ---------                               -----------         ------                      CBC with platelets and d...[[id number]]  Abnormal            Final result                 Please view results for these tests on the individual orders.   Comprehensive metabolic panel   Result Value Ref Range    Sodium 138 133 - 144 mmol/L    Potassium 3.4 3.4 - 5.3 mmol/L    Chloride 100 94 - 109 mmol/L    Carbon Dioxide (CO2) 30 20 - 32 mmol/L    Anion Gap 8 3 - 14 mmol/L    Urea Nitrogen 9 7 - 30 mg/dL    Creatinine 0.70 0.66 - 1.25 mg/dL    Calcium 8.1 (L) 8.5 - 10.1 mg/dL    Glucose 99 70 - 99 mg/dL    Alkaline Phosphatase 91 40 - 150 U/L    AST 50 (H) 0 - 45 U/L    ALT 43 0 - 70 U/L    Protein Total 7.7 6.8 - 8.8 g/dL    Albumin 3.7 3.4 - 5.0 g/dL    Bilirubin Total 0.5 0.2 - 1.3 mg/dL    GFR Estimate >90 >60 mL/min/1.73m2   Ethyl Alcohol Level   Result Value Ref Range    Alcohol ethyl 0.27 (H) <=0.01 g/dL   CBC with platelets and differential   Result Value Ref Range    WBC Count 6.5 4.0 - 11.0 10e3/uL    RBC Count 4.29 (L) 4.40 - 5.90 10e6/uL    Hemoglobin 13.9 13.3 - 17.7 g/dL    Hematocrit 39.7 (L) 40.0 - 53.0 %    MCV 93 78 - 100 fL    MCH 32.4 26.5 - 33.0 pg    MCHC 35.0 31.5 - 36.5 g/dL    RDW 13.2 10.0 - 15.0 %    Platelet Count 228 150 - 450 10e3/uL    % Neutrophils 50 %    % Lymphocytes 42 %    % Monocytes 7 %    % Eosinophils 1 %    % Basophils 0 %    % Immature Granulocytes 0 %    NRBCs per 100 WBC 0 <1 /100    Absolute Neutrophils 3.2 1.6 - 8.3 10e3/uL    Absolute Lymphocytes 2.7 0.8 - 5.3 10e3/uL    Absolute Monocytes 0.5 0.0 - 1.3 10e3/uL    Absolute Eosinophils 0.1 0.0 - 0.7 10e3/uL    Absolute Basophils 0.0 0.0 - 0.2 10e3/uL    Absolute Immature Granulocytes 0.0 <=0.0 10e3/uL    Absolute NRBCs 0.0 10e3/uL       Labs, vital signs, and imaging studies were reviewed by me.    Medications   hydrOXYzine (ATARAX) tablet 25 mg (25 mg Oral Given 9/15/21 2047)       Assessments & Plan (with Medical Decision Making)   Paulo Franco is a 45 year old male who presents to the emergency department  seeking detox placement.  Hydroxyzine was ordered for symptomatic relief in the emergency department.    Patient was discussed with detox intake, they do have a bed available for the patient.  Medical clearance labs were ordered.    Laboratory work-up is remarkable for ETOH 0.276, UDS positive for benzodiazepines also    I have reviewed the nursing notes.    I have reviewed the findings, diagnosis, plan and need for follow up with the patient.    Patient discussed with detox intake, to be admitted to their service for further management. Plan was discussed with patient who understands and agrees with plan.     New Prescriptions    No medications on file       Final diagnoses:   Alcohol abuse     Caren Madrigal MD  9/15/2021   [facility] EMERGENCY DEPARTMENT     Caren Madrigal MD  09/15/21 [id number]

## 2021-09-16 NOTE — CONSULTS
Patient Name: Paulo Franco MRN# [id number]   Age: 45 year old YOB: 1975     Date of Service: 9/16/2021    Circumstances of recent discharge and re-admittance noted. Please refer to recent medicine H&P in charting completed by Roshni Capellan PA-C, and dated 8/25/21, which was reviewed by this writer and is up to date.     In brief, Paulo Franco is a 45 year old male with a history of alcohol use disorder, polysubstance abuse, and recent esophagitis who is admitted to station 3A for alcohol withdrawal and detox.  He was recently admitted to station 3A for similar complaint 8/24-8/27.       Diagnoses:  # Alcohol withdrawal, hx of alcohol use disorder   # Polysubstance abuse   MSSA 12 this shift.  Hx withdrawal seizures in the past.  Not currently on AEDs.  Please add seizure precautions.  Continue MSSA w/ Valium.  Continue folvite, multi-vites, thiamine supplementation.  Further management per Psychiatry.     # Esophagitis - Hospitalized in 6/2021 for esophagitis w/ hematemesis. Continue PTA Protonix 40mg BID and Carafate 1g QID and HS.        Last 24 hr vitals and laboratory data were reviewed. No further recommendations at this time.  Please call the on-call [facility] for any follow up medical concerns if they arise.       Raisa Huffman, CNP, APRN  Internal Medicine [facility]  Pager [phone]

## 2021-09-16 NOTE — PROGRESS NOTES
09/16/21 0441   Patient Belongings   Did you bring any home meds/supplements to the hospital?  No   Patient Belongings locker;sent to security per site process   Patient Belongings Put in Hospital Secure Location (Security or Locker, etc.) cash/credit card;clothing;wallet;shoes;plastic bag;other (see comments)   Belongings Search Yes   Clothing Search Yes   Second Staff Adryan S     ITEMS IN STORAGE:   Hospital bag, white/black hoodie with strings, black shoes with laces, Senior's receipt, grey T-shirt with buttons, black pants with strings, PSD boxer briefs, maroon quarter zip, pop, snacks     Vitals room: red bag with clothes, books, glasses, snacks and personal hygiene products      ITEMS IN SECURITY (envelope #[id number]):   Brown wallet, four $1 bills, 2 [employer] cards, Social Security card, [employer] card - ending in 2500, [place] EBT card - ending in 9099, [employer] debit - ending in 1033, [place] 's License    Security ([id number]):  $300.00 cash    A               Admission:  I am responsible for any personal items that are not sent to the safe or pharmacy.  [facility] is not responsible for loss, theft or damage of any property in my possession.    Signature:  _________________________________ Date: _______  Time: _____                                              Staff Signature:  ____________________________ Date: ________  Time: _____      2nd Staff person, if patient is unable/unwilling to sign:    Signature: ________________________________ Date: ________  Time: _____     Discharge:  [facility] has returned all of my personal belongings:    Signature: _________________________________ Date: ________  Time: _____                                          Staff Signature:  ____________________________ Date: ________  Time: _____

## 2021-09-16 NOTE — PROGRESS NOTES
Internal medicine contacted due to atients pulse elevated at 139BPM in the context of active alcohol withdrawal. Valium 10mg given for a score of 13 per MSSA protocol.No new orders obtained

## 2021-09-16 NOTE — PLAN OF CARE
Behavioral Team Discussion: (9/16/2021)    Continued Stay Criteria/Rationale: Patient admitted for Chemical Use Issues.  Plan: The following services will be provided to the patient; psychiatric assessment, medication management, therapeutic milieu, individual and group support, and skills groups.   Participants: 3A Provider: Dr. Yasir Gonzales MD; 3A RN: Chepe Conklin RN; 3A CM's: Lesa Montes.  Summary/Recommendation: Providers will assess today for treatment recommendations, discharge planning, and aftercare plans. CM will meet with pt for discharge planning.   Medical/Physical: Per ED note:  past medical history of Lyme disease, alcohol dependence and concussion    Precautions:   Behavioral Orders   Procedures     Code 1 - Restrict to Unit     Fall precautions     Routine Programming     As clinically indicated     Seizure precautions     Status 15     Every 15 minutes.     Withdrawal precautions     Rationale for change in precautions or plan: N/A  Progress: Initial.

## 2021-09-16 NOTE — ED NOTES
ED to Behavioral Floor Handoff    SITUATION  Paulo Franco is a 45 year old male who speaks English and lives is homeless with others The patient arrived in the ED by walking from streets with a complaint of Alcohol Intoxication  .The patient's current symptoms started/worsened 2 week(s) ago and during this time the symptoms have increased.   In the ED, pt was diagnosed with   Final diagnoses:   Alcohol abuse        Initial vitals were: BP: 129/73  Pulse: 119  Temp: 98.3  F (36.8  C)  Resp: 16  Weight: 70.8 kg (156 lb)  SpO2: 95 %   --------  Is the patient diabetic? No   If yes, last blood glucose? --     If yes, was this treated in the ED? --  --------  Is the patient inebriated (ETOH) Yes or Impaired on other substances? No  MSSA done? Yes  Last MSSA score: 12    Were withdrawal symptoms treated? Yes  Does the patient have a seizure history? Yes. If yes, date of most recent seizure 2 years ago.  --------  Is the patient patient experiencing suicidal ideation? denies current or recent suicidal ideation     Homicidal ideation? denies current or recent homicidal ideation or behaviors.    Self-injurious behavior/urges? denies current or recent self injurious behavior or ideation.  ------  Was pt aggressive in the ED No  Was a code called No  Is the pt now cooperative? Yes  -------  Meds given in ED:   Medications   diazepam (VALIUM) tablet 5-20 mg (10 mg Oral Given 9/16/21 0258)   hydrOXYzine (ATARAX) tablet 25 mg (25 mg Oral Given 9/15/21 2047)      Family present during ED course? No  Family currently present? No    BACKGROUND  Does the patient have a cognitive impairment or developmental disability? No  Allergies: No Known Allergies.   Social demographics are   Social History     Socioeconomic History     Marital status: Single     Spouse name: None     Number of children: None     Years of education: None     Highest education level: None   Occupational History     None   Tobacco Use     Smoking status: Never  Smoker     Smokeless tobacco: Never Used   Substance and Sexual Activity     Alcohol use: Yes     Comment: 1-2 liters of Vodka per day, last drank 4 hours ago     Drug use: No     Sexual activity: Yes     Partners: Female     Birth control/protection: None   Other Topics Concern     Parent/sibling w/ CABG, MI or angioplasty before 65F 55M? Not Asked   Social History Narrative     None     Social Determinants of Health     Financial Resource Strain:      Difficulty of Paying Living Expenses:    Food Insecurity:      Worried About Running Out of Food in the Last Year:      Ran Out of Food in the Last Year:    Transportation Needs:      Lack of Transportation (Medical):      Lack of Transportation (Non-Medical):    Physical Activity:      Days of Exercise per Week:      Minutes of Exercise per Session:    Stress:      Feeling of Stress :    Social Connections:      Frequency of Communication with Friends and Family:      Frequency of Social Gatherings with Friends and Family:      Attends Latter day Services:      Active Member of Clubs or Organizations:      Attends Club or Organization Meetings:      Marital Status:    Intimate Partner Violence:      Fear of Current or Ex-Partner:      Emotionally Abused:      Physically Abused:      Sexually Abused:         ASSESSMENT  Labs results   Labs Ordered and Resulted from Time of ED Arrival Up to the Time of Departure from the ED   COMPREHENSIVE METABOLIC PANEL - Abnormal; Notable for the following components:       Result Value    Calcium 8.1 (*)     AST 50 (*)     All other components within normal limits   ETHYL ALCOHOL LEVEL - Abnormal; Notable for the following components:    Alcohol ethyl 0.27 (*)     All other components within normal limits   DRUG ABUSE SCREEN 1 URINE (ED) - Abnormal; Notable for the following components:    Benzodiazepines Urine Screen Positive (*)     All other components within normal limits   CBC WITH PLATELETS AND DIFFERENTIAL - Abnormal;  Notable for the following components:    RBC Count 4.29 (*)     Hematocrit 39.7 (*)     All other components within normal limits   ALCOHOL BREATH TEST POCT - Abnormal; Notable for the following components:    Alcohol Breath Test 0.276 (*)     All other components within normal limits   COVID-19 VIRUS (CORONAVIRUS) BY PCR - Normal    Narrative:     Testing was performed using the Xpert Xpress SARS-CoV-2 Assay on the  Cepheid Gene-Xpert Instrument Systems. Additional information about  this Emergency Use Authorization (EUA) assay can be found via the Lab  Guide. This test should be ordered for the detection of SARS-CoV-2 in  individuals who meet SARS-CoV-2 clinical and/or epidemiological  criteria. Test performance is unknown in asymptomatic patients. This  test is for in vitro diagnostic use under the FDA EUA for  laboratories certified under CLIA to perform high complexity testing.  This test has not been FDA cleared or approved. A negative result  does not rule out the presence of PCR inhibitors in the specimen or  target RNA in concentration below the limit of detection for the  assay. The possibility of a false negative should be considered if  the patient's recent exposure or clinical presentation suggests  COVID-19. This test was validated by the [facility]. This laboratory is certified under  the Clinical Laboratory Improvement Amendments of 1988 (CLIA-88) as  qualified to perform high complexity laboratory testing.     MSSA SCORE AND VS   NOTIFY   URINE DRUGS OF ABUSE SCREEN    Narrative:     The following orders were created for panel order Urine Drugs of Abuse Screen.  Procedure                               Abnormality         Status                     ---------                               -----------         ------                     Drug abuse screen 1 urin...[[id number]]  Abnormal            Final result                 Please view results for these  tests on the individual orders.   CBC WITH PLATELETS & DIFFERENTIAL    Narrative:     The following orders were created for panel order CBC with platelets differential.  Procedure                               Abnormality         Status                     ---------                               -----------         ------                     CBC with platelets and d...[[id number]]  Abnormal            Final result                 Please view results for these tests on the individual orders.      Imaging Studies: No results found for this or any previous visit (from the past 24 hour(s)).   Most recent vital signs /79   Pulse 111   Temp 98.9  F (37.2  C) (Oral)   Resp 16   Wt 70.8 kg (156 lb)   SpO2 95%   BMI 21.16 kg/m     Abnormal labs/tests/findings requiring intervention:---   Pain control: pt had none  Nausea control: pt had none    RECOMMENDATION  Are any infection precautions needed (MRSA, VRE, etc.)? No If yes, what infection? --  ---  Does the patient have mobility issues? independently. If yes, what device does the pt use? ---  ---  Is patient on 72 hour hold or commitment? No If on 72 hour hold, have hold and rights been given to patient? N/A  Are admitting orders written if after 10 p.m. ?N/A  Tasks needing to be completed:---     Karis Escamilla RN   [facility]-- [phone] [place] [facility]   [phone] [facility]

## 2021-09-16 NOTE — PLAN OF CARE
MARBELLA Franco is a 45-year-old male with a chief complaint of alcohol intoxication    S = Situation:     Voluntary Admission to [facility]    B  = Background:     The patient is a 45-year-old male seeking detox for alcohol. He reported drinking about a liter of alcohol daily. He reportedly had his last drink about 4 hours before arrival at the ED.  His breathalyzer reading was 0.276, and he has a history of seizures but no DTs. He tested negative for all other drugs and COVID-19.       A  =  Assessment:     He appeared somnolent during the admission process but was cooperative and answered all questions. He denied SI/HI and contracted for safety on the unit. His MSSA score at admission was a 12. He expressed a desire to go to CD treatment after detox.    Vital Signs: /81   Pulse 119   Temp 98.2  F (36.8  C) (Oral)   Resp 16   Wt 70.8 kg (156 lb)   SpO2 91%   BMI 21.16 kg/m          R =   Request or Recommendation:     Q-15 safety checks, MSSA, seizure precautions, withdrawal precautions, and therapeutic milieu

## 2021-09-16 NOTE — PROGRESS NOTES
"[facility]  UNIVERSAL ADULT DIAGNOSTIC ASSESSMENT - Substance Use Disorder    Provider Name and Credentials: Carlyn Montes [facility]    PATIENT'S NAME: Paulo Franco  PREFERRED NAME: Paulo  PRONOUNS: he/him/his     MRN: [id number]  : 1975   Last 4 SSN: [id number]  ACCT. NUMBER:  [id number]  DATE OF SERVICE: 2021   START TIME: 2021 at 11:22 am  END TIME: 2021 at 1:09 pm  PREFERRED PHONE: [phone]   May we leave a program related message: Yes  SERVICE MODALITY:  In-person      Identifying Information:  Patient is a 45 year old,  male who was referred for an assessment by self. The pronoun use throughout this assessment reflects the patient's chosen pronoun. Patient attended the session alone.     Chief Complaint:   The reason for seeking services at this time is: \"alcohol use and looking for inpatient treatment\"  The problem(s) began at age 13. Patient has attempted to resolve these concerns in the past through treatment.  Patient is in active withdrawal, but is currently admitted to [facility] for medical detoxification and withdrawal monitoring and is not an imminent safety risk to self or others, and may proceed with the assessment interview    Social/Family History:  Patient reported he grew up in [place], [place]. Patient was raised by biological parents. Patient reported that his childhood was \"good, my father passed away when I was [age] years old, but my childhood was good up to that point\". Reports he passed due to a heart disorder. Patient reports he has 1 sibling who passed away due to same heart condition at age 31. Patient denies any history of childhood abuse or neglect. Reports mother remarried.  Patient describes current relationships with family of origin as positive.      The patient describes his cultural background as \"white\".  Cultural influences and impact on patient's life structure, values, norms, and healthcare: none identified.  " "Contextual influences on patient's health include: Individual Factors substance abuse.  Patient identified his preferred language to be English. Patient reported he does not need the assistance of an  or other support involved in therapy.     Patient reports he is not involved in community of shahram activities. Patients reports spirituality impacts his recovery in the following ways: \"it's unbelievable, what I do is go for bike rides\".     Patient reported had no significant delays in developmental tasks.  Patient's highest education level was high school graduate. Patient identified the following learning problems: attention.  Patient reports he is able to understand written materials.    Patient reported the following relationship history: single, no children.    Patient identified his sexual orientation as heterosexual.  Patient reported having zero child(kaylah).     Patient's current living/housing situation involves homelessness and he reports that housing is not stable. Patient identified mother and significant other as part of his support system.  Patient identified the quality of these relationships as good.      Patient reports engaging in the following recreational/leisure activities: biking. Patient is currently unemployed.  Patient reports his income is obtained through employment but is now unemployed.  Patient does identify finances as a current stressor.      Patient reports the following substance related arrests or legal issues: domestic and terroristic threats.  Patient does report being on probation / parole / under the jurisdiction of the court: [facility].    Patient's Strengths and Limitations:  Patient identified the following strengths or resources that will help him succeed in treatment: good learner. Things that may interfere with the patient's success in treatment include: housing instability and legal issues.     Personal and Family Medical History:   Patient did report a " "family history of mental health concerns.  Patient reports the following family history:   Family History   Problem Relation Age of Onset     Polycystic Kidney Diease Mother      Substance Abuse Mother      Depression Mother      Heart Defect Father      Heart Defect Brother         Patient reported the following previous mental health diagnoses: ADHD.  Patient reports their primary mental health symptoms include: \"fast rapid thoughts, very little ability to think things over, difficult to sit and read\"  and these do impact his ability to function.   Patient has received mental health services in the past: therapy.  Psychiatric Hospitalizations: None.  Patient denies a history of civil commitment.  Current mental health services/providers include:  Currently seeing a therapist: Mae Egan.    GAIN-SS:   GAIN-SS Tool:   When was the last time that you had significant problems... 9/16/2021   with feeling very trapped, lonely, sad, blue, depressed or hopeless about the future? 2 to 12 months ago   with sleep trouble, such as bad dreams, sleeping restlessly, or falling asleep during the day? Past Month   with feeling very anxious, nervous, tense, scared, panicked or like something bad was going to happen? Past month   with becoming very distressed & upset when something reminded you of the past? Past month   with thinking about ending your life or committing suicide? Past month     When was the last time that you did the following things 2 or more times? 9/16/2021   Lied or conned to get things you wanted or to avoid having to do something? Past month   Had a hard time paying attention at school, work or home? Past month   Had a hard time listening to instructions at school, work or home? Past month   Were a bully or threatened other people? Past month   Started physical fights with other people? Past month       Patient has had a physical exam to rule out medical causes for current symptoms.  Date of last physical " "exam was within the past year. Client was encouraged to follow up with PCP if symptoms were to develop. The patient does not have a Primary Care Provider and was encouraged to establish care with a PCP.. Patient reports no current medical concerns.  Patient denies any issues with pain..   Patient denies pregnancy, is male. There are significant appetite / nutritional concerns / weight changes. Patient does not report a history of an eating disorder. Patient does report a history of head injury / trauma / cognitive impairment.  \"I've had several concussions\"    Patient reports current meds as:   Outpatient Medications Marked as Taking for the 9/15/21 encounter (Hospital Encounter)   Medication Sig     folic acid (FOLVITE) 1 MG tablet Take 1 tablet (1 mg) by mouth daily     multivitamin w/minerals (THERA-VIT-M) tablet Take 1 tablet by mouth daily     pantoprazole (PROTONIX) 40 MG EC tablet Take 1 tablet (40 mg) by mouth 2 times daily     sucralfate (CARAFATE) 1 GM tablet Take 1 tablet (1 g) by mouth 4 times daily     thiamine (B-1) 100 MG tablet Take 1 tablet (100 mg) by mouth daily     Current Facility-Administered Medications   Medication     atenolol (TENORMIN) tablet 50 mg     diazepam (VALIUM) tablet 5-20 mg     folic acid (FOLVITE) tablet 1 mg     multivitamin w/minerals (THERA-VIT-M) tablet 1 tablet     pantoprazole (PROTONIX) EC tablet 40 mg     sucralfate (CARAFATE) tablet 1 g     thiamine (B-1) tablet 100 mg       Medication Adherence:  Patient reports taking prescribed medications as prescribed.    Patient Allergies:  No Known Allergies    Medical History:    Past Medical History:   Diagnosis Date     Concussion      Substance abuse (H)     Done with treatment on 7/16/2018       Rating Scales:    PHQ9:  No flowsheet data found.;      Substance Use:  Patient reported the following biological family members or relatives with chemical health issues: mother was an alcoholic.  Patient has received substance use " disorder and/or gambling treatment in the past.  Patient reports the following dates and locations of treatment services:  Recently in [facility] treatment at [facility].  Patient has been to detox.  Patient is currently receiving the following services: CD Treatment at [facility]. Patient reports they have attended the following support groups: AA in the past.        Substance Age of first use Pattern and duration of use (include amounts and frequency) Date of last use     Withdrawal potential Route of administration   Has used Alcohol 13 Age 13: First Use  Age 25: 6 pack/day beer  Age 27: 12 pack/day beer  Age 35: 1 liter vodka/day  Current: 1 liter/day vodka 9/15/21 Yes oral   Has not used Marijuana      NA  NA     Has not used Amphetamines      NA  NA   Has not used Cocaine/ crack       NA  NA   Has not used Hallucinogens    NA  NA   Has not used Inhalants    NA  NA   Has not used Heroin    NA  NA   Has not used Other Opiates    NA  NA   Has not used Benzodiazepine      NA  NA   Has not used Barbiturates    NA  NA   Has not used Over the counter meds.    NA  NA   Has not used Caffeine    NA  NA   Has not used Nicotine     NA  NA   Has not used other substances not listed above:  Identify:     NA  NA        Patient reported the following problems as a result of their substance use: academic performance, money management, home life with others.  Patient is concerned about substance use.     Patient reports experiencing the following withdrawal symptoms within the past 12 months: sweating, shaky/jittery/tremors, unable to sleep, agitation, headache, fatigue, sad/depressed feeling, muscle aches, vivid/unpleasant dreams, irritability, sensitivity to noise, high blood pressure, nausea/vomiting, dizziness, seizures, diarrhea, diminished appetite, hallucinations, unable to eat, fever, psychosis, confused/disrupted speech and anxiety/worry and the following within the past 30 days: sweating, shaky/jittery/tremors, unable to sleep,  "agitation, headache, fatigue, sad/depressed feeling, muscle aches, vivid/unpleasant dreams, irritability, sensitivity to noise, high blood pressure, nausea/vomiting, dizziness, seizures, diarrhea, diminished appetite, hallucinations, unable to eat, fever, psychosis, confused/disrupted speech and anxiety/worry.   Patients reports urges to use Alcohol.  Patient reports he has used more Alcohol than intended and over a longer period of time than intended. Patient reports he has had unsuccessful attempts to cut down or control use of Alcohol.  Patient reports longest period of abstinence was 2.5 years and return to use was due to panic attack. Patient reports he has needed to use more Alcohol to achieve the same effect.  Patient does  report diminished effect with use of same amount of Alcohol.     Patient does  report a great deal of time is spent in activities necessary to obtain, use, or recover from Alcohol effects.  Patient does  report important social, occupational, or recreational activities are given up or reduced because of Alcohol use.  Alcohol use is continued despite knowledge of having a persistent or recurrent physical or psychological problem that is likely to have caused or exacerbated by use.  Patient reports the following problem behaviors while under the influence of substances: unprotected sex. Patient reports his recovery goals are \"long term sobriety\".     Patient reports substance use has impacted his ability to function in a school setting. Patient reports substance use has impacted his ability to function in a work setting.      Patient does not have a history of gambling concerns and/or treatment. Patient does not have other addictive behaviors he is concerned about.       Dimension Scale Ratings:    Dimension 1 -  Acute Intoxication/Withdrawal: 1 - Minor Problem  Dimension 2 - Biomedical: 1 - Minor Problem  Dimension 3 - Emotional/Behavioral/Cognitive Conditions: 2 - Moderate " Problem  Dimension 4 - Readiness to Change:  2 - Moderate Problem  Dimension 5 - Relapse/Continued Use/ Continued Problem Potential: 4 - Extreme Problem  Dimension 6 - Recovery Environment:  4 - Extreme Problem    Significant Losses / Trauma / Abuse / Neglect Issues:   Patient did serve in the .  There are indications or report of significant loss, trauma, abuse or neglect issues related to: death of father and brother.  Concerns for possible neglect are not present.     Safety Assessment:   Current Safety Concerns:  [place] Suicide Severity Rating Scale (Short Version)  [place] Suicide Severity Rating (Short Version) 5/29/2021 8/23/2021 9/15/2021 9/16/2021   Over the past 2 weeks have you felt down, depressed, or hopeless? no no no -   Over the past 2 weeks have you had thoughts of killing yourself? no no no -   Have you ever attempted to kill yourself? no no no -   Q1 Wished to be Dead (Past Month) - - no no   Q2 Suicidal Thoughts (Past Month) - - no no   Q3 Suicidal Thought Method - - no no   Q4 Suicidal Intent without Specific Plan - - no no   Q5 Suicide Intent with Specific Plan - - no no   Q6 Suicide Behavior (Lifetime) - - no no     Patient denies current homicidal ideation and behaviors.  Patient denies current self-injurious ideation and behaviors.    Patient denied risk behaviors associated with substance use.  Patient denies any high risk behaviors associated with mental health symptoms.  Patient reports the following current concerns for their personal safety: None.  Patient reports there are not firearms in the house.     History of Safety Concerns:  Patient denied a history of homicidal ideation.     Patient denied a history of personal safety concerns.    Patient denied a history of assaultive behaviors.    Patient denied a history of sexual assault behaviors.     Patient denied a history of risk behaviors associated with substance use.  Patient denies any history of high risk behaviors  associated with mental health symptoms.  Patient reports the following protective factors: forward/future oriented thinking, regular physical activity, committment to well-being and positive social skills    Risk Plan:  See Recommendations for Safety and Risk Management Plan    Review of Symptoms per patient report:  Substance Use:  blackouts, passing out, vomiting, hangovers, daily use, substance use at work, driving under the influence, riding with someone under the influence and cravings/urges to use     Collateral Contact Summary:   Collateral contacts contributing to this assessment:  Hospital records    If court related records were reviewed, summarize here: NA    Information from collateral contacts supported/largely agreed with information from the client and associated risk ratings.    Information in this assessment was obtained from the medical record and provided by patient who is a fair historian.    Patient will have open access to their mental health medical record.    Diagnostic Criteria: 1.) Alcohol/drug is often taken in larger amounts or over a longer period than was intended.  Met for Alcohol.  2.) There is a persistent desire or unsuccessful efforts to cut down or control alcohol/drug use.  Met for Alcohol.  3.) A great deal of time is spent in activities necessary to obtain alcohol, use alcohol, or recover from its effects.  Met for Alcohol.  4.) Craving, or a strong desire or urge to use alcohol/drug.  Met for Alcohol.  5.) Recurrent alcohol/drug use resulting in a failure to fulfill major role obligations at work, school or home.  Met for Alcohol.  6.) Continued alcohol use despite having persistent or recurrent social or interpersonal problems caused or exacerbated by the effects of alcohol/drug.  Met for Alcohol.  7.) Important social, occupational, or recreational activities are given up or reduced because of alcohol/drug use.  Met for Alcohol.  8.) Recurrent alcohol/drug use in situations  in which it is physically hazardous.  Met for Alcohol.  9.) Alcohol/drug use is continued despite knowledge of having a persistent or recurrent physical or psychological problem that is likely to have been caused or exacerbated by alcohol.  Met for Alcohol.  10.) Tolerance, as defined by either of the following: A need for markedly increased amounts of alcohol/drug to achieve intoxication or desired effect..  Met for Alcohol.  11.) Withdrawal, as manifested by either of the following: The characteristic withdrawal syndrome for alcohol/drug (refer to Criteria A and B of the criteria set for alcohol/drug withdrawal).. Met for Alcohol.       As evidenced by self report and criteria, client meets the following DSM5 Diagnoses:   Alcohol Use Disorder-Severe    Recommendations:     1. Plan for Safety and Risk Management:  Recommended that patient call 911 or go to the local ED should there be a change in any of these risk factors..      Report to child / adult protection services was NA.     2. KEENAN Referrals:   Recommendations:  [facility].  Patient reports they are willing to follow these recommendations.  Patient would like the following family or other support people involved in their treatment: No. Patient does not have a history of opiate use.    3. Mental Health Referrals:  Return to current therapist     4. Patient identified no cultural concerns that need to be addressed in treatment.   Patient identified the following cultural components that he would like addressed in treatment: None identified    5. Recommendations for treatment focus:   1)  Complete a residential based or similar treatment program similar to [facility].   2)  Abstain from all mood-altering chemicals unless prescribed by a licensed provider.   3)  Attend weekly 12-step support group meetings.     4)  Actively work with a male sponsor or  through [place] [employer] ([phone]).   5)   Follow all the recommendations of your treatment/medical providers.  6)  Remain law abiding and follow all recommendations of the Courts/PO.  7)  Patient may benefit from obtaining a full mental health evaluation.  8)  Patient could benefit from 1:1 psychotherapy due to past history of mental health         DAANES Assessment ID: [id number]  Provider Name/ Credentials:  Carlyn Montes [facility]  September 16, 2021

## 2021-09-16 NOTE — PHARMACY-ADMISSION MEDICATION HISTORY
Admission Medication History Completed by Pharmacy    See [facility] Admission Navigator for allergy information, preferred outpatient pharmacy, prior to admission medications and immunization status.     Medication History Sources:     Patient    Sure Scripts    Discharged from [facility] on 8/27/21    Changes made to PTA medication list (reason):    Added: None    Deleted: None    Changed: None    Additional Information:    Patient reports no changes to his medications since discharging from [facility] on 8/27/21.    Prior to Admission medications    Medication Sig Last Dose Taking? Auth Provider   folic acid (FOLVITE) 1 MG tablet Take 1 tablet (1 mg) by mouth daily Past Week at Unknown time Yes Yasir Gonzales MD   multivitamin w/minerals (THERA-VIT-M) tablet Take 1 tablet by mouth daily Past Week at Unknown time Yes Yasir Gonzales MD   pantoprazole (PROTONIX) 40 MG EC tablet Take 1 tablet (40 mg) by mouth 2 times daily Past Week at Unknown time Yes Yasir Gonzales MD   sucralfate (CARAFATE) 1 GM tablet Take 1 tablet (1 g) by mouth 4 times daily Past Week at Unknown time Yes Yasir Gonzales MD   thiamine (B-1) 100 MG tablet Take 1 tablet (100 mg) by mouth daily Past Week at Unknown time Yes Yasir Gonzales MD       Date completed: 09/16/21    Medication history completed by: Roshni Rico, PharmD, BCPS

## 2021-09-17 PROCEDURE — H2032 ACTIVITY THERAPY, PER 15 MIN: HCPCS

## 2021-09-17 PROCEDURE — 128N000004 HC R&B CD ADULT

## 2021-09-17 PROCEDURE — 250N000013 HC RX MED GY IP 250 OP 250 PS 637: Performed by: PSYCHIATRY & NEUROLOGY

## 2021-09-17 PROCEDURE — 99231 SBSQ HOSP IP/OBS SF/LOW 25: CPT | Performed by: PSYCHIATRY & NEUROLOGY

## 2021-09-17 RX ORDER — GABAPENTIN 300 MG/1
300 CAPSULE ORAL 3 TIMES DAILY PRN
Status: DISCONTINUED | OUTPATIENT
Start: 2021-09-17 | End: 2021-09-20 | Stop reason: HOSPADM

## 2021-09-17 RX ADMIN — ATENOLOL 50 MG: 50 TABLET ORAL at 20:38

## 2021-09-17 RX ADMIN — SUCRALFATE 1 G: 1 TABLET ORAL at 12:51

## 2021-09-17 RX ADMIN — THIAMINE HCL TAB 100 MG 100 MG: 100 TAB at 09:41

## 2021-09-17 RX ADMIN — DIAZEPAM 10 MG: 5 TABLET ORAL at 20:38

## 2021-09-17 RX ADMIN — PANTOPRAZOLE SODIUM 40 MG: 40 TABLET, DELAYED RELEASE ORAL at 20:38

## 2021-09-17 RX ADMIN — PANTOPRAZOLE SODIUM 40 MG: 40 TABLET, DELAYED RELEASE ORAL at 09:41

## 2021-09-17 RX ADMIN — MULTIPLE VITAMINS W/ MINERALS TAB 1 TABLET: TAB at 09:41

## 2021-09-17 RX ADMIN — FOLIC ACID 1 MG: 1 TABLET ORAL at 09:41

## 2021-09-17 RX ADMIN — DIAZEPAM 10 MG: 5 TABLET ORAL at 13:49

## 2021-09-17 RX ADMIN — SUCRALFATE 1 G: 1 TABLET ORAL at 16:50

## 2021-09-17 RX ADMIN — SUCRALFATE 1 G: 1 TABLET ORAL at 09:41

## 2021-09-17 RX ADMIN — SUCRALFATE 1 G: 1 TABLET ORAL at 20:38

## 2021-09-17 ASSESSMENT — ACTIVITIES OF DAILY LIVING (ADL)
DRESS: INDEPENDENT
ORAL_HYGIENE: INDEPENDENT;PROMPTS
LAUNDRY: WITH SUPERVISION
DRESS: INDEPENDENT;SCRUBS (BEHAVIORAL HEALTH)
HYGIENE/GROOMING: INDEPENDENT;PROMPTS
HYGIENE/GROOMING: INDEPENDENT
ORAL_HYGIENE: INDEPENDENT
LAUNDRY: WITH SUPERVISION

## 2021-09-17 NOTE — PLAN OF CARE
Problem: Substance Withdrawal  Intervention: Substance Withdrawal  Recent Flowsheet Documentation  Taken 9/17/2021 1300 by Bri Keys RN  Substance Withdrawal Interventions:   interventions implemented as appropriate   monitor substance withdrawal process   seizure precautions   encourage nutrition and hydration   assess patient response to medication  Alcohol Withdrawl Interventions:   monitor need for prn medication   provide emotional support   establish therapeutic relationship   assist with developing & utilizing healthy coping strategies    Pt medicated x 1 with valium 10 mg. Pt has received a total of 65 mg of valium since admission.     No oversedation noted.     Pt reported his anxiety a 10 on a 0-10 severe scale. Denies SI.     Pt is dealing with a stressor of not knowing where his car is. He and his GF are trying to figure out if his car was towed or stolen.     See order for prn gabapentin for anxiety.     Pt states he works as a .       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     Eating 100% of his meals.     Pt out on unit.

## 2021-09-17 NOTE — PLAN OF CARE
Problem: Adult Inpatient Plan of Care  Goal: Plan of Care Review  Outcome: Improving  Flowsheets (Taken 9/16/2021 1600)  Plan of Care Reviewed With: patient    Pt has been visible in the milieu this evening, and he was social with peers. Affect full range and mood happy. Pt repeatedly said that he was doing well and feeling good. He denied SI/SIB, A/VH's, anxiety, HI, pain, constipation, and depression.  Pt expressed no concerns about his meds. He took all scheduled meds; hence no SE was observed. His goal is to increase his fluids intake, and the MSSA scores were 4 and 8. He took Valium 10mg. VS WNL.

## 2021-09-17 NOTE — PROGRESS NOTES
[facility], [place]   Psychiatric Progress Note        Interim history   This is a 45 year old male with   Alcohol use disorder severe.Pt seen in rounds.   The patient's care was discussed with the treatment team during the daily team meeting and/or staff's chart notes were reviewed.  Staff report patient has been visible in the milieu,  no acute eventsovernight.     Patient's mood is BETTER  Energy Level:MODERATE  Sleep:No concerns, sleeps well through night  Appetite:fair  IMPROVED motivation interest   DENEID ANY Suicidal/homicidal ideation/plan intent.  DENEID ANY psychosis  No prior suicde attempts  No access to gun  Pt is in alcohol withdrawal still being monitered every 4 hrs for it,   Pt mssa score are monitered  Tolerating meds and has no side effects.              Medications:     Current Facility-Administered Medications   Medication     atenolol (TENORMIN) tablet 50 mg     diazepam (VALIUM) tablet 5-20 mg     folic acid (FOLVITE) tablet 1 mg     multivitamin w/minerals (THERA-VIT-M) tablet 1 tablet     pantoprazole (PROTONIX) EC tablet 40 mg     sucralfate (CARAFATE) tablet 1 g     thiamine (B-1) tablet 100 mg             Allergies:   No Known Allergies         Psychiatric Examination:   Blood pressure (!) 147/95, pulse 84, temperature 98.2  F (36.8  C), temperature source Temporal, resp. rate 16, weight 70.8 kg (156 lb), SpO2 99 %.  Weight is 156 lbs 0 oz  Body mass index is 21.16 kg/m .    Appearance:  awake, alert and adequately groomed  Attitude:  cooperative  Eye Contact:  good  Mood:  better  Affect:  appropriate and in normal range and mood congruent  Speech:  clear, coherent rate /rhythm are GOOD  Psychomotor Behavior:  no evidence of tardive dyskinesia, dystonia, or tics and intact station, gait and muscle tone  Throught Process:  logical  Associations:  no loose associations  Thought Content:  no evidence of suicidal ideation or homicidal ideation, no evidence of  psychotic thought, no auditory hallucinations present and no visual hallucinations present  Insight:  limited  Judgement:  intact  Oriented to:  time, person, and place  Attention Span and Concentration:  intact  Recent and Remote Memory:  intact  Language fund of knowledge are adequate         Labs:     No results found for: NTBNPI, NTBNP  Lab Results   Component Value Date    WBC 6.5 09/15/2021    HGB 13.9 09/15/2021    HCT 39.7 (L) 09/15/2021    MCV 93 09/15/2021     09/15/2021     Lab Results   Component Value Date    TSH 2.50 09/15/2021         DX   Alcohol use disorder severe     PLAN   Alcohol intoxication/withdrawal, presently is on MSSA protocol with Valium. Continue the same MSSA protocol as ordered. Continue thiamine 100 mg p.o. daily, M.V.I. one p.o. daily and folate 1 mg p.o. Daily  Will continue mssa protocal to detox off alcohol on valium,  Pt is c/o of termor , agitation poor sleep and poor appetite, he has sweats, feels shakey  On mssa client scored scored4 today and  needed needed 0 mg po as of yet , total dose since admission was 55MG    MSSA    Eating Disturbances: ate and enjoyed all of it or not applicable  Tremor: 2  Sleep Disturbance: slept through the night or not applicable  Clouding of Sensorium: no evidence  Hallucinations: 0 - none  Quality of Contact: 0 - awareness of examiner and people around him/her  Agitation: 0 - normal activity  Paroxysmal Sweats: 1 - barely perceptible sweating  Temperature: 99.5 or below  Pulse: 1 - 70 to 79  Total MSSA Score: 5    Laboratory/Imaging: reviewed with patient   Consults: internal medicine consult reviewed  Patient will be treated in therapeutic milieu with appropriate individual and group therapies as described.  PDMP CHECKED     Supportive psychotheraoy provided, roselyn talked about recovery enviroment, relapse prevention, triggers to use.  Discussed with patient many issues of addiction,triggers, relapse, and establishing a solid recovery  program.  Asked pt to be med complinat   Medical diagnoses to be addressed this admission:    Plan:  Diagnoses:  # Alcohol withdrawal, hx of alcohol use disorder   # Polysubstance abuse   MSSA 12 this shift.  Hx withdrawal seizures in the past.  Not currently on AEDs.  Please add seizure precautions.  Continue MSSA w/ Valium.  Continue folvite, multi-vites, thiamine supplementation.  Further management per Psychiatry.      # Esophagitis - Hospitalized in 6/2021 for esophagitis w/ hematemesis. Continue PTA Protonix 40mg BID and Carafate 1g QID and HS.          Last 24 hr vitals and laboratory data were reviewed. No further recommendations at this time.  Please call the on-call ROSI for any follow up medical concerns if they arise.       Legal Status: voluntary    Safety Assessment:   Checks:  15 min  Precautions: withdrawal precautions  Pt has not required locked seclusion or restraints in the past 24 hours to maintain safety, please refer to RN documentation for further details.  Discussed with patient many issues of addiction,triggers, relapse, and establishing a solid recovery program.  Able to give informed consent:  YES   Discussed Risks/Benefits/Side Effects/Alternatives: YES    After discussion of the indications, risks, benefits, alternatives and consequences of no treatment, the patient elects to COMPLETE DETOX AND DO TRT

## 2021-09-17 NOTE — PLAN OF CARE
"Music Therapy Group note    Clinical Hours in session: 1.0    Number of patients in group: 4    Scope of service: psychodynamic     Patient progress: initial encounter    Intervention: \"Hold On\"     Goal of group: reflection     Patient response/reaction to treatment intervention(s): \"Antonio\", as he introduced himself, participated in music and written prompts today.  He shared the most of his peers, and presented as self-focused.  The change he wants to make is to \"stop drinking-for even 6 months\" and have a \"semi-normal life\" and says he would \"give anything\" to have this.  His insight appears limited into what supports he might need to attain this life.  He is engaged and cooperative during session, but appears to be somewhat unaware of others around him, though is pleasantly social with peer from time to time.  He reports having a therapist \"that we B.S. about that kind of stuff\".      Twyla Donovan, [facility]  Board-Certified Music Therapist           "

## 2021-09-18 PROCEDURE — 128N000004 HC R&B CD ADULT

## 2021-09-18 PROCEDURE — H2035 A/D TX PROGRAM, PER HOUR: HCPCS | Mod: HQ

## 2021-09-18 PROCEDURE — 250N000013 HC RX MED GY IP 250 OP 250 PS 637: Performed by: PSYCHIATRY & NEUROLOGY

## 2021-09-18 PROCEDURE — 250N000013 HC RX MED GY IP 250 OP 250 PS 637: Performed by: CLINICAL NURSE SPECIALIST

## 2021-09-18 RX ORDER — ACETAMINOPHEN 325 MG/1
325-650 TABLET ORAL EVERY 4 HOURS PRN
Status: DISCONTINUED | OUTPATIENT
Start: 2021-09-18 | End: 2021-09-20 | Stop reason: HOSPADM

## 2021-09-18 RX ADMIN — THIAMINE HCL TAB 100 MG 100 MG: 100 TAB at 08:34

## 2021-09-18 RX ADMIN — PANTOPRAZOLE SODIUM 40 MG: 40 TABLET, DELAYED RELEASE ORAL at 08:34

## 2021-09-18 RX ADMIN — SUCRALFATE 1 G: 1 TABLET ORAL at 08:34

## 2021-09-18 RX ADMIN — SUCRALFATE 1 G: 1 TABLET ORAL at 16:20

## 2021-09-18 RX ADMIN — ACETAMINOPHEN 650 MG: 325 TABLET, FILM COATED ORAL at 20:51

## 2021-09-18 RX ADMIN — PANTOPRAZOLE SODIUM 40 MG: 40 TABLET, DELAYED RELEASE ORAL at 20:01

## 2021-09-18 RX ADMIN — SUCRALFATE 1 G: 1 TABLET ORAL at 20:00

## 2021-09-18 RX ADMIN — SUCRALFATE 1 G: 1 TABLET ORAL at 11:38

## 2021-09-18 RX ADMIN — MULTIPLE VITAMINS W/ MINERALS TAB 1 TABLET: TAB at 08:34

## 2021-09-18 RX ADMIN — FOLIC ACID 1 MG: 1 TABLET ORAL at 08:34

## 2021-09-18 ASSESSMENT — ACTIVITIES OF DAILY LIVING (ADL)
LAUNDRY: WITH SUPERVISION
ORAL_HYGIENE: INDEPENDENT
HYGIENE/GROOMING: INDEPENDENT
HYGIENE/GROOMING: INDEPENDENT
LAUNDRY: WITH SUPERVISION
ORAL_HYGIENE: INDEPENDENT
DRESS: INDEPENDENT
DRESS: INDEPENDENT

## 2021-09-18 NOTE — PLAN OF CARE
"S:  Paulo has been visible in the milieu.  He has participated in Music therapy and AA via [id number] group.  He is eating and drinking normally.  He denies any thoughts of self harm, suicide or thoughts of harming others.  He denies depression but endorses anxiety at an 8.  He states that his mood is \"good\".  B: Pt admitted for alcohol withdrawal and detoxification.  A:  Pt in minimal to moderate alcohol withdrawal AEB MSSA scores of 7 & 10.  R:  Administered regularly scheduled sucralfate, twice, pantoprazole 40 mg. One dose of PRN diazepam 10 mg was given.  Continue to monitor and medicate as ordered and indicated.   "

## 2021-09-18 NOTE — PROVIDER NOTIFICATION
09/18/21 1111   Vital Signs   Temp 98.1  F (36.7  C)   Temp src Temporal   Resp 16   Pulse 111   Pulse Rate Source Monitor   /80   BP Location Left arm     Patient tachycardic at lunchtime. Patient asymptomatic at this time. Internal medicine, Raisa Huffman, paged and notified.

## 2021-09-18 NOTE — PLAN OF CARE
S:  Paulo has been visible in the milieu.  He has participated in both Music therapy group and AA via Zoom group. He is eating and drinking normally.  He denies any thoughts of self harm, suicide or thoughts of harming others.  He denies depression but endorses anxiety at an 8.  He was worried about the whereabouts of his car.  He was encouraged to shower, but did not take writer's advice.  B: Pt admitted for alcohol withdrawal and detoxification.  A:  Pt in minimal to moderate alcohol withdrawal AEB MSSA scores of 7 &10.  R:  Administered diazepam 10 mg, sucralfate 1 gm, twice and pantoprazole 40 mg.  Continue to monitor and medicate as ordered and indicated.

## 2021-09-18 NOTE — PLAN OF CARE
"Problem: Adult Inpatient Plan of Care  Goal: Plan of Care Review  Outcome: Improving  Flowsheets (Taken 9/18/2021 0943)  Plan of Care Reviewed With: patient     Problem: Substance Withdrawal  Goal: Substance Withdrawal  Description: Signs and symptoms of listed problems will be absent or manageable.    1. Detoxification from Alcohol using the [facility] valium protocol  2. Patient will complete assessment paperwork  3.  Patient will meet with  to discuss treatment and discharge planning  4. Physical examination and Lab evaluation by MD  5. Patients oral intake will be greater than 75 % of meals to meet estimated needs  6. Adequate fluid intake    Outcome: Improving    Patient is up and visible in the milieu. Patient is ambulating balanced and steady. Patient is alert and oriented x 4. Patient is attending/participating in unit programming. Pt is social with peers. Affect is full-range, mood is calm. Patient denies SI/SIB/HI. Pt denies auditory/visual hallucinations. Patient verbally contracts for safety on the unit. Patient is tolerating medications well, denies any current side effects.     Pt's MSSA = 5 and 6 this shift. Patient stated, \"I feel much better.\" Patient reports a good appetite, and good sleep. Patient states he is looking forward to [facility] on Monday. Rest, fluids, and food encouraged. Status 15 checks remain. Patient denies any unmet needs at this time.     Blood pressure (!) 126/90, pulse 85, temperature 98.1  F (36.7  C), temperature source Temporal, resp. rate 16, weight 70.8 kg (156 lb), SpO2 97 %.   "

## 2021-09-19 PROCEDURE — 128N000004 HC R&B CD ADULT

## 2021-09-19 PROCEDURE — 250N000013 HC RX MED GY IP 250 OP 250 PS 637: Performed by: CLINICAL NURSE SPECIALIST

## 2021-09-19 PROCEDURE — H2032 ACTIVITY THERAPY, PER 15 MIN: HCPCS

## 2021-09-19 PROCEDURE — 250N000013 HC RX MED GY IP 250 OP 250 PS 637: Performed by: PSYCHIATRY & NEUROLOGY

## 2021-09-19 RX ORDER — CLONIDINE HYDROCHLORIDE 0.1 MG/1
0.1 TABLET ORAL 2 TIMES DAILY PRN
Status: DISCONTINUED | OUTPATIENT
Start: 2021-09-19 | End: 2021-09-19

## 2021-09-19 RX ADMIN — FOLIC ACID 1 MG: 1 TABLET ORAL at 09:11

## 2021-09-19 RX ADMIN — PANTOPRAZOLE SODIUM 40 MG: 40 TABLET, DELAYED RELEASE ORAL at 09:11

## 2021-09-19 RX ADMIN — ACETAMINOPHEN 650 MG: 325 TABLET, FILM COATED ORAL at 16:21

## 2021-09-19 RX ADMIN — PANTOPRAZOLE SODIUM 40 MG: 40 TABLET, DELAYED RELEASE ORAL at 20:37

## 2021-09-19 RX ADMIN — THIAMINE HCL TAB 100 MG 100 MG: 100 TAB at 09:11

## 2021-09-19 RX ADMIN — SUCRALFATE 1 G: 1 TABLET ORAL at 20:37

## 2021-09-19 RX ADMIN — ACETAMINOPHEN 650 MG: 325 TABLET, FILM COATED ORAL at 09:14

## 2021-09-19 RX ADMIN — SUCRALFATE 1 G: 1 TABLET ORAL at 16:21

## 2021-09-19 RX ADMIN — MULTIPLE VITAMINS W/ MINERALS TAB 1 TABLET: TAB at 09:11

## 2021-09-19 RX ADMIN — SUCRALFATE 1 G: 1 TABLET ORAL at 09:11

## 2021-09-19 RX ADMIN — SUCRALFATE 1 G: 1 TABLET ORAL at 11:32

## 2021-09-19 ASSESSMENT — ACTIVITIES OF DAILY LIVING (ADL)
ORAL_HYGIENE: INDEPENDENT
HYGIENE/GROOMING: INDEPENDENT
DRESS: INDEPENDENT
LAUNDRY: WITH SUPERVISION
HYGIENE/GROOMING: INDEPENDENT
ORAL_HYGIENE: INDEPENDENT
LAUNDRY: WITH SUPERVISION
DRESS: INDEPENDENT

## 2021-09-19 NOTE — PLAN OF CARE
"  Problem: Suicidal Behavior  Goal: Suicidal Behavior is Absent or Managed  Outcome: Improving   Patient denied suicidal thoughts or wishing to be dead. Denied depression and anxiety. Denied hallucinations or paranoia.   Thoughts are linear, logical, and future oriented. Speech is clear and organized. Mood is calm, with full range of emotions. Memory is intact. Patient has good appetite, ate 100% of breakfast and lunch. He keeps self clean and well groomed. Denied having concerns or questions at this time.   Behavior is appropriate. Pt is social and pleasant on approach.   Patient reported back pain from sleeping on the hard mattress. Took PRN Tylenol 650 mg that was reported as effective within an hour.     Problem: Substance Withdrawal  Goal: Social and Therapeutic (Substance Withdrawal)  Description: Signs and symptoms of listed problems will be absent or manageable.  Outcome: Improving   Patient's detox has been completed yesterday. He denied having withdraw symptoms. Said he feels good and is waiting to move \"upstairs\" for treatment tomorrow.   "

## 2021-09-19 NOTE — PROGRESS NOTES
" 09/18/21 2100   Groups   Details    (Psychotherapy)   Number of patients attending the group:  8  Group Length:  1 Hours     Group Therapy Type: Psychotherapy     Summary of Group / Topics Discussed:Psychotherapy        The  Psychotherapy group goal is to promote insight to positive choice and change. Group processing is within a supportive and safe environment. Patients will process emotions using verbal group and expressive psychotherapy interventions including visual art/writing interventions.     Group interventions support patients by: fostering self awareness, creative self expression and mental health management     Modalities to reach these goals include: Self Compassion in Addiction Process Group and Art Therapy     Subjective -patient report of mood today- \"very depressed\"     Objective/ Intervention- Goal of group and Therapeutic modality utilized- Self Compassion,coloring, writing compassion mantra/ affirmation.     Group Response- very  engaged      Patient Response-Pt was pleasant, cooperative, engaged and social. He defined self compassion is being here in detox , where everybody here has the common humanity of getting help, which was a powerful beginning to group. He used to be a  for  and he told stories. Group discussed  struggle with addiction in this context. Antonio also reported feeling remorse for how he treats his girlfriend when he is relapsing and he is grateful for her visit today and for her bringing him some belongings    . Gurvinder Loyola, JIMFT, ATR-BC                     "

## 2021-09-19 NOTE — PLAN OF CARE
S:  Paulo has been visible in the milieu. He participated in art therapy group. He is eating and drinking normally. He denies any thoughts of self harm, suicide or thoughts of harming others. He denied depression (rated 0 out of 10) and reported significant improvement in anxiety symptoms (rated 2 out of 10).  B: Pt admitted for alcohol withdrawal and detoxification.  A: Pt scored MSSA of 5 and 6 this shift. Pt has scored below 8 on MSSA scale for greater than 24 hours and is out of detox. Pt complained of pain in his feet from walking around in socks over the past few days. Rated his pain 5 out of 10. Tylenol ordered.  R:  Administered sucralfate 1 gm x2, pantoprazole 40 mg, and tylenol 650 mg. Continue to monitor and medicate as ordered and indicated.
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
## 2021-09-20 ENCOUNTER — HOSPITAL ENCOUNTER (OUTPATIENT)
Dept: BEHAVIORAL HEALTH | Facility: CLINIC | Age: 46
End: 2021-09-20
Attending: FAMILY MEDICINE
Payer: COMMERCIAL

## 2021-09-20 VITALS
SYSTOLIC BLOOD PRESSURE: 137 MMHG | RESPIRATION RATE: 16 BRPM | BODY MASS INDEX: 21.16 KG/M2 | OXYGEN SATURATION: 98 % | TEMPERATURE: 97.9 F | DIASTOLIC BLOOD PRESSURE: 95 MMHG | WEIGHT: 156 LBS | HEART RATE: 100 BPM

## 2021-09-20 VITALS — TEMPERATURE: 98.2 F | OXYGEN SATURATION: 97 %

## 2021-09-20 PROCEDURE — 99239 HOSP IP/OBS DSCHRG MGMT >30: CPT | Performed by: PSYCHIATRY & NEUROLOGY

## 2021-09-20 PROCEDURE — 250N000013 HC RX MED GY IP 250 OP 250 PS 637: Performed by: PSYCHIATRY & NEUROLOGY

## 2021-09-20 PROCEDURE — 1002N00001 HC LODGING PLUS FACILITY CHARGE ADULT

## 2021-09-20 RX ORDER — MULTIPLE VITAMINS W/ MINERALS TAB 9MG-400MCG
1 TAB ORAL DAILY
Qty: 30 TABLET | Refills: 0 | Status: ON HOLD | OUTPATIENT
Start: 2021-09-20 | End: 2021-12-17

## 2021-09-20 RX ORDER — SUCRALFATE 1 G/1
1 TABLET ORAL 4 TIMES DAILY
Qty: 120 TABLET | Refills: 0 | Status: ON HOLD | OUTPATIENT
Start: 2021-09-20 | End: 2021-12-17

## 2021-09-20 RX ORDER — PANTOPRAZOLE SODIUM 40 MG/1
40 TABLET, DELAYED RELEASE ORAL 2 TIMES DAILY
Qty: 60 TABLET | Refills: 0 | Status: SHIPPED | OUTPATIENT
Start: 2021-09-20 | End: 2021-09-20

## 2021-09-20 RX ORDER — PANTOPRAZOLE SODIUM 40 MG/1
40 TABLET, DELAYED RELEASE ORAL 2 TIMES DAILY
Qty: 60 TABLET | Refills: 0 | Status: ON HOLD | OUTPATIENT
Start: 2021-09-20 | End: 2021-12-17

## 2021-09-20 RX ORDER — ACETAMINOPHEN 325 MG/1
325-650 TABLET ORAL EVERY 4 HOURS PRN
COMMUNITY
End: 2021-10-12

## 2021-09-20 RX ORDER — AMOXICILLIN 250 MG
2 CAPSULE ORAL DAILY PRN
COMMUNITY
End: 2021-10-12

## 2021-09-20 RX ORDER — LANOLIN ALCOHOL/MO/W.PET/CERES
100 CREAM (GRAM) TOPICAL DAILY
Qty: 30 TABLET | Refills: 0 | Status: ON HOLD | OUTPATIENT
Start: 2021-09-20 | End: 2021-12-17

## 2021-09-20 RX ORDER — LORATADINE 10 MG/1
10 TABLET ORAL DAILY
COMMUNITY
End: 2021-10-12

## 2021-09-20 RX ORDER — GABAPENTIN 300 MG/1
300 CAPSULE ORAL 3 TIMES DAILY PRN
Qty: 30 CAPSULE | Refills: 1 | Status: SHIPPED | OUTPATIENT
Start: 2021-09-20 | End: 2021-10-15

## 2021-09-20 RX ORDER — IBUPROFEN 200 MG
400 TABLET ORAL EVERY 6 HOURS PRN
COMMUNITY
End: 2021-10-12

## 2021-09-20 RX ORDER — LANOLIN ALCOHOL/MO/W.PET/CERES
3 CREAM (GRAM) TOPICAL
COMMUNITY
End: 2021-10-12

## 2021-09-20 RX ORDER — MAGNESIUM HYDROXIDE/ALUMINUM HYDROXICE/SIMETHICONE 120; 1200; 1200 MG/30ML; MG/30ML; MG/30ML
30 SUSPENSION ORAL EVERY 6 HOURS PRN
COMMUNITY
End: 2021-10-12

## 2021-09-20 RX ORDER — FOLIC ACID 1 MG/1
1 TABLET ORAL DAILY
Qty: 30 TABLET | Refills: 0 | Status: ON HOLD | OUTPATIENT
Start: 2021-09-20 | End: 2021-12-17

## 2021-09-20 RX ADMIN — MULTIPLE VITAMINS W/ MINERALS TAB 1 TABLET: TAB at 08:48

## 2021-09-20 RX ADMIN — SUCRALFATE 1 G: 1 TABLET ORAL at 13:01

## 2021-09-20 RX ADMIN — SUCRALFATE 1 G: 1 TABLET ORAL at 08:47

## 2021-09-20 RX ADMIN — THIAMINE HCL TAB 100 MG 100 MG: 100 TAB at 08:47

## 2021-09-20 RX ADMIN — FOLIC ACID 1 MG: 1 TABLET ORAL at 08:48

## 2021-09-20 RX ADMIN — PANTOPRAZOLE SODIUM 40 MG: 40 TABLET, DELAYED RELEASE ORAL at 08:47

## 2021-09-20 ASSESSMENT — ANXIETY QUESTIONNAIRES
4. TROUBLE RELAXING: SEVERAL DAYS
3. WORRYING TOO MUCH ABOUT DIFFERENT THINGS: SEVERAL DAYS
6. BECOMING EASILY ANNOYED OR IRRITABLE: SEVERAL DAYS
GAD7 TOTAL SCORE: 7
5. BEING SO RESTLESS THAT IT IS HARD TO SIT STILL: SEVERAL DAYS
7. FEELING AFRAID AS IF SOMETHING AWFUL MIGHT HAPPEN: SEVERAL DAYS
2. NOT BEING ABLE TO STOP OR CONTROL WORRYING: SEVERAL DAYS
1. FEELING NERVOUS, ANXIOUS, OR ON EDGE: SEVERAL DAYS

## 2021-09-20 ASSESSMENT — ACTIVITIES OF DAILY LIVING (ADL)
DRESS: SCRUBS (BEHAVIORAL HEALTH)
LAUNDRY: WITH SUPERVISION
HYGIENE/GROOMING: INDEPENDENT
ORAL_HYGIENE: INDEPENDENT

## 2021-09-20 ASSESSMENT — PATIENT HEALTH QUESTIONNAIRE - PHQ9: SUM OF ALL RESPONSES TO PHQ QUESTIONS 1-9: 9
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
## 2021-09-20 NOTE — DISCHARGE SUMMARY
Paulo Franco MRN# [id number]   Age: 45 year old YOB: 1975     Date of Admission:  9/15/2021  Date of Discharge:  9/20/2021  Admitting Physician:  Yasir Gonzales MD  Discharge Physician:  Yasir Gonzales MD      DISCHARGE  DX    Diagnosis  Alcohol use disorder severe           Event Leading to Hospitalization:     See Admission note by admitting provider for patient encounter. for additional details.          Hospital Course:   PATIENT was admitted to [facility] attending  under DR gonzales, please review the detailed admit note on 9/16/21   The patient was placed under status 15 (15 minute checks) to ensure patient safety.   MSSA protocol was initiated due to the patient's history of alcohol abuse and concern for withdrawal symptoms.  CBC, BMP and utox obtained.    All outpatient medications were continued    PATIENTdid participate in groups and was visible in the milieu.     The patient's symptoms of alcohol withdrawal  improved.     Patients energy motivation , sleep appetite improved.  Pt completed detox . It was un eventful.      Discussed with patient medications for craving.  Spoke with patient about triggers coping skills relapse prevention.    CONSULTS DONE DURING PATIENTS HOSPITALIZATION.  Patient was seen by medicine on date9/20/21    This as per their medical consult    Diagnoses:  # Alcohol withdrawal, hx of alcohol use disorder   # Polysubstance abuse   MSSA 12 this shift.  Hx withdrawal seizures in the past.  Not currently on AEDs.  Please add seizure precautions.  Continue MSSA w/ Valium.  Continue folvite, multi-vites, thiamine supplementation.  Further management per Psychiatry.      # Esophagitis - Hospitalized in 6/2021 for esophagitis w/ hematemesis. Continue PTA Protonix 40mg BID and Carafate 1g QID and HS.                Pt was seen by cm  As per recommendations from cm  Bed being held for pt to admit to [facility] on Monday 9/20. PT and team notified, AVS updated.             Labs:reviewed with patient     No results found for this or any previous visit (from the past 48 hour(s)).      Recent Results (from the past 240 hour(s))   Alcohol breath test POCT    Collection Time: 09/15/21  7:34 PM   Result Value Ref Range    Alcohol Breath Test 0.276 (A) 0.00 - 0.01   Drug abuse screen 1 urine (ED)    Collection Time: 09/15/21  8:37 PM   Result Value Ref Range    Amphetamines Urine Screen Negative Screen Negative    Barbiturates Urine Screen Negative Screen Negative    Benzodiazepines Urine Screen Positive (A) Screen Negative    Cannabinoids Urine Screen Negative Screen Negative    Cocaine Urine Screen Negative Screen Negative    Opiates Urine Screen Negative Screen Negative   Comprehensive metabolic panel    Collection Time: 09/15/21  8:40 PM   Result Value Ref Range    Sodium 138 133 - 144 mmol/L    Potassium 3.4 3.4 - 5.3 mmol/L    Chloride 100 94 - 109 mmol/L    Carbon Dioxide (CO2) 30 20 - 32 mmol/L    Anion Gap 8 3 - 14 mmol/L    Urea Nitrogen 9 7 - 30 mg/dL    Creatinine 0.70 0.66 - 1.25 mg/dL    Calcium 8.1 (L) 8.5 - 10.1 mg/dL    Glucose 99 70 - 99 mg/dL    Alkaline Phosphatase 91 40 - 150 U/L    AST 50 (H) 0 - 45 U/L    ALT 43 0 - 70 U/L    Protein Total 7.7 6.8 - 8.8 g/dL    Albumin 3.7 3.4 - 5.0 g/dL    Bilirubin Total 0.5 0.2 - 1.3 mg/dL    GFR Estimate >90 >60 mL/min/1.73m2   Ethyl Alcohol Level    Collection Time: 09/15/21  8:40 PM   Result Value Ref Range    Alcohol ethyl 0.27 (H) <=0.01 g/dL   CBC with platelets and differential    Collection Time: 09/15/21  8:40 PM   Result Value Ref Range    WBC Count 6.5 4.0 - 11.0 10e3/uL    RBC Count 4.29 (L) 4.40 - 5.90 10e6/uL    Hemoglobin 13.9 13.3 - 17.7 g/dL    Hematocrit 39.7 (L) 40.0 - 53.0 %    MCV 93 78 - 100 fL    MCH 32.4 26.5 - 33.0 pg    MCHC 35.0 31.5 - 36.5 g/dL    RDW 13.2 10.0 - 15.0 %    Platelet Count 228 150 - 450 10e3/uL    % Neutrophils 50 %    % Lymphocytes 42 %    % Monocytes 7 %    % Eosinophils 1 %     % Basophils 0 %    % Immature Granulocytes 0 %    NRBCs per 100 WBC 0 <1 /100    Absolute Neutrophils 3.2 1.6 - 8.3 10e3/uL    Absolute Lymphocytes 2.7 0.8 - 5.3 10e3/uL    Absolute Monocytes 0.5 0.0 - 1.3 10e3/uL    Absolute Eosinophils 0.1 0.0 - 0.7 10e3/uL    Absolute Basophils 0.0 0.0 - 0.2 10e3/uL    Absolute Immature Granulocytes 0.0 <=0.0 10e3/uL    Absolute NRBCs 0.0 10e3/uL   GGT    Collection Time: 09/15/21  8:40 PM   Result Value Ref Range    GGT 49 0 - 75 U/L   TSH with free T4 reflex    Collection Time: 09/15/21  8:40 PM   Result Value Ref Range    TSH 2.50 0.40 - 4.00 mU/L   Asymptomatic COVID-19 Virus (Coronavirus) by PCR Nasopharyngeal    Collection Time: 09/15/21  8:43 PM    Specimen: Nasopharyngeal; Swab   Result Value Ref Range    SARS CoV2 PCR Negative Negative   UA with Microscopic reflex to Culture    Collection Time: 09/16/21  9:38 AM    Specimen: Urine, Clean Catch   Result Value Ref Range    Color Urine Light Yellow Colorless, Straw, Light Yellow, Yellow    Appearance Urine Clear Clear    Glucose Urine Negative Negative mg/dL    Bilirubin Urine Negative Negative    Ketones Urine Negative Negative mg/dL    Specific Gravity Urine 1.010 1.003 - 1.035    Blood Urine Negative Negative    pH Urine 8.0 (H) 5.0 - 7.0    Protein Albumin Urine Negative Negative mg/dL    Urobilinogen Urine Normal Normal, 2.0 mg/dL    Nitrite Urine Negative Negative    Leukocyte Esterase Urine Negative Negative    RBC Urine <1 <=2 /HPF    WBC Urine 2 <=5 /HPF            Because this patient meets criteria for an Alcohol Use Disorder, I performed the following brief intervention on the date of this note:              1) Expressed concern that the patient is drinking at unhealthy levels known to increase their risk of alcohol related problems              2) Gave feedback linking alcohol use and health, including personalized feedback explaining how alcohol use can interact with their medical and/or psychiatric problems,  and with prescribed medications.              3) Advised patient to abstain.    PT counseled on nicotine cessation and nicotine replacement provided    Discussed with patient many issues of addiction,triggers, relapse, and establishing a solid recovery program.    DISCHARGE MENTAL STATUS EXAMINATION:  The patient is alert, oriented x3.  Good fund of knowledge.  Good use of language.  Recent and remote memory, language, fund of knowledge are all adequate.  Euthymic mood congruent affect  Speech normal rate/rhythm linear tp no loose asso,The patient does not have any active suicidal or homicidal ideation.  Does not have any auditory or visual hallucination.  Fair insight/judgment At this time, the patient was stable to be discharged.        Pt was not determined to not be a danger to himself or others. At the current time of discharge, the patient does not meet criteria for involuntary hospitalization. On the day of discharge, the patient reports that they do not have suicidal or homicidal ideation and would never hurt themselves or others. Steps taken to minimize risk include: assessing patient s behavior and thought process daily during hospital stay, discharging patient with adequate plan for follow up for mental and physical health and discussing safety plan of returning to the hospital should the patient ever have thoughts of harming themselves or others. Therefore, based on all available evidence including the factors cited above, the patient does not appear to be at imminent risk for self-harm, and is appropriate for outpatient level of care.     Educated about side effects/risk vs benefits /alternative including non treatment.Pt consented to be on medication.     .Total time spent on discharge summary more than 35 min  More than  20 min  planning, coordination of care, medication reconciliation and performance of physical exam on day of discharge.Care was coordinated with unit RN and unit therapist     Joan  "Paulo PEREZ   Home Medication Instructions SHAGUFTA:[id number]    Printed on:09/20/21 9310   Medication Information                      folic acid (FOLVITE) 1 MG tablet  Take 1 tablet (1 mg) by mouth daily             gabapentin (NEURONTIN) 300 MG capsule  Take 1 capsule (300 mg) by mouth 3 times daily as needed (anxiety)             multivitamin w/minerals (THERA-VIT-M) tablet  Take 1 tablet by mouth daily             pantoprazole (PROTONIX) 40 MG EC tablet  Take 1 tablet (40 mg) by mouth 2 times daily             sucralfate (CARAFATE) 1 GM tablet  Take 1 tablet (1 g) by mouth 4 times daily             thiamine (B-1) 100 MG tablet  Take 1 tablet (100 mg) by mouth daily             Facts about COVID19 at www.cdc.gov/COVID19 and www.[place].gov/covid19     Keeping hands clean is one of the most important steps we can take to avoid getting sick and spreading germs to others.  Please wash your hands frequently and lather with soap for at least 20 seconds!       Disposition: [facility]  Medical Follow-Up:with pco in 10 day          .        \"Much or all of the text in this note was generated through the use of Dragon Dictate voice to text software. Errors in spelling or words which appear to be out of contact are unintentional, may be present due having escaped editing\"     "
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
## 2021-09-20 NOTE — PROGRESS NOTES
Progress Note    This patient had a Comprehensive Substance Abuse assessment on 9/16/21 completed by Lesa Montes LPC, DAVID.  This patient was seen for a face to face update of the Comprehensive Substance Abuse assessment on 9/20/2021 by DAVID Clemons.  INSIDE: The patient's Comprehensive Substance Abuse assessment completed on 9/16/21 is in the patient's electronic medical record in Epic in the Chart Review section under the Notes/Trans Tab.    Alcohol/Drug use since the last CD evaluation (include date of last use):     No additional substances use since the last CD evaluation     Please note any other clinical changes since the last CD evaluation (such as medication changes, additional legal charges, detoxification admissions, overdoses, etc.)     No significant changes since the last CD evaluation       ASAM Dimensions Original scores Current Scores   I.) Intoxication and Withdrawal: 1 0   II.) Biomedical:  1 1   III.) Emotional and Behavioral:  2 2   IV.) Readiness to Change:  2 2   V.) Relapse Potential: 4 4   VI.) Recovery Environmental: 4 4     Please list clinical justifications for the above ASAM score changes since the original comprehensive assessment:     The patient's score on Dimension I changed from a 1 to a 0.  The patient's withdrawal symptoms had been addressed by his physicians while he had been on 3 A IP detoxification unit at [facility] in [place], [place].         Current WHITNEY: Current UA:     No WHITNEY as the patient was a direct transfer from 3 A IP detoxification unit at [facility] in [place], [place].     No UA screen as the patient was a direct transfer from 3 A IP detoxification unit at [facility] in [place], [place].        PHQ-9, TRAVIS-7   PHQ-9 on 9/20/2021 TRAVIS-7 on 9/20/2021   The patient's PHQ-9 score was 9 out of 27, indicating mild depression.   The patient's TRAVIS-7 score was 7 out of 21, indicating mild anxiety.       Logan-Suicide Severity Rating  Scale Reassessment   Have you ever wished you were dead or that you could go to sleep and not wake up?  Past Month:  No     Have you actually had any thoughts of killing yourself?  Past Month:  No     Have you been thinking about how you might do this?     Past Month:  No   Lifetime:  No   Have you had these thoughts and had some intention of acting on them?     Past Month:  No   Lifetime:  No   Have you started to work out the details of how to kill yourself?   Past Month:  No   Lifetime:  No   Do you intend to carry out this plan?   No     When you have the thoughts how long do they last?  The patient denied ever having any suicidal thoughts in life.     Are there things - anyone or anything (i.e. family, Confucianism, pain of death) that stopped you from wanting to die or acting on thoughts of suicide?  Does not apply       2008  [employer]  Used with permission by Gerda Rodríguez, PhD.       Guide to C-SSRS Risk Ratings   NO IDEATION:  with no active thoughts IDEATION: with a wish to die. IDEATION: with active thoughts. Risk Ratings   If Yes No No 0 - Very Low Risk   If NA Yes No 1 - Low Risk   If NA Yes Yes 2 - Low/moderate risk   IDEATION: associated thoughts of methods without intent or plan INTENT: Intent to follow through on suicide PLAN: Plan to follow through on suicide Risk Ratings cont...   If Yes No No 3 - Moderate Risk   If Yes Yes No 4 - High Risk   If Yes Yes Yes 5 - High Risk   The patient's ADDITIONAL RISK FACTORS and lack of PROTECTIVE FACTORS may increase their overall suicide risk ratings.     Additional Risk Factors:    Significant history of untreated or poorly treated chronic pain issues     A recent death of someone close to the patient and/or unresolved grief and loss issues     A recent loss that was significant to the patient, i.e. loss of job, loss of home, divorce, break-up, etc.   Protective Factors:    Having people in his/her life that would prevent  "the patient from considering a suicide attempt (i.e. young children, spouse, parents, etc.)     An absence of mental health issues or stable and well treated mental health issues     An absence of chronic health problems or stable and well treated chronic health issues     Having easy access to supportive family members     Having a good community support network     Having restricted access to highly lethal means of suicide     Risk Status   0. - Very Low Risk:  Evaluation Counselors:  Document in Epic / SBAR to counselor \"Very Low Risk\".      Treatment Counselors:  Reassess upon admission as applicable, assess weekly in progress notes under Dimension 3 and summarize in Discharge / Treatment summary under Dimension 3.     Additional information to support suicide risk rating: There was no additional information to provide at this time.       "

## 2021-09-20 NOTE — PROGRESS NOTES
Initial Services Plan        Service Initiation Date: 9/20/2021    Immediate health and/or safety concerns: No    Identify health and safety concern(s) below and include plan to address:    None Identified    Treatment suggestions for client during the time between intake (admit date) and completion of the individual treatment plan:     Look for a sober support network, i.e. 12 step, Smart Recovery, Celebrate Recovery, etc  Tour the treatment center or outpatient clinic  Introduce yourself to your treatment group. Spend time getting to know your peers  Review your patient or client handbook  Begin working on your treatment goal list    Completed by: DAVID Clemons  Date completed: 9/20/2021 at 3:05 PM

## 2021-09-20 NOTE — DISCHARGE INSTRUCTIONS
Behavioral Discharge Planning and Instructions  THANK YOU FOR CHOOSING [facility]  3AW  [phone]    Summary: You were admitted to [facility] on 9/16/21 for detoxification from alcohol.  A medical exam was performed that included lab work. You have met with a  and opted to attend treatment at [facility].  Please take care and make your recovery a daily priority, Paulo!  It was a pleasure working with you and the entire treatment team here wishes you the very best in your recovery!     Recommendation:  28 days treatment in [facility]    Main Diagnosis:  Per Dr. Yasir Gonzales MD;  303.90 (F10.20) Alcohol Use Disorder Severe    Major Treatments, Procedures and Findings:  You were treated for alcohol detoxification using valium administered based on the [facility] protocol. You had a chemical dependency assessment. You had labs drawn and those results were reviewed with you. Please take a copy of your lab work with you to your next primary care provider appointment.    Symptoms to Report:  If you experience more anxiety, confusion, sleeplessness, deep sadness or thoughts of suicide, notify your treatment team or notify your primary care provider. IF ANY OF THE SYMPTOMS YOU ARE EXPERIENCING ARE A MEDICAL EMERGENCY CALL 911 IMMEDIATELY.     Lifestyle Adjustment: Adjust your lifestyle to get enough sleep, relaxation, exercise and good nutrition. Continue to develop healthy coping skills to decrease stress and promote a sober living environment. Do not use mood altering substances including alcohol, illegal drugs or addictive medications other than what is currently prescribed.     Disposition: [facility]    Facts about COVID19 at www.cdc.gov/COVID19 and www.[place].gov/covid19    Keeping hands clean is one of the most important steps we can take to avoid getting sick and spreading germs to others.  Please wash your hands frequently and lather with soap for at least 20  seconds!    Medical Follow-Up:  You declined to set up a follow up prior to discharge, please be seen within 3 weeks at your primary care provider.    Treatment Follow-Up:  [facility]  Admission: Monday September 20th at 1345  [address] [place] [address]  [phone]    Recovery apps for your phone to locate current in person and zoom recovery meetings  [place] - meeting bernardino  AA  - meeting bernardino  Meeting guide - meeting bernardino  Quick NA meeting - meeting bernardino  Jolie- has various apps    Resources:  *due to covid-19 most AA/NA meetings are being held online*  AA meetings online search for them at: https://aa-intergroup.org (worldwide meeting listings)  AA meetings for [place] area can be found online at: https://a[place].org (click local online meetings listings)  NA meetings for [place] area can be found online at: https://www.nam[place].org  (click find a meeting)  AA and NA Sponsors are excellent resources for support and you can find one at any support group meeting.   Alcoholics Anonymous (https://aa.org/): for information 24 hours/day  AA Intergroup service office in [place] (http://www.aastpaul.org/) 255.871.7736  AA Intergroup service office in [facility]: [phone]. (http://www.aaminneaEmay Softcomis.org/)  Narcotics Anonymous (www.nam[place].org) (395) 647-7062  https://aafairviewriverside.org/meetings  SMART Recovery - self management for addiction recovery:  www.smartrecovery.org  Pathways ~ A Health Crisis Resource & Support Center:  974.283.7665.  https://prescribetoprevent.org/patient-education/videos/  http://www.harmreduction.org  [facility] 897-756-1854  Support Group:  AA/NA and Sponsor/support.  National [place] on Mental Illness (www.[place].trevor.org): 542.178.2251 or 403-679-4026.  Alcoholics Anonymous (www.alcoholics-anonymous.org): Check your phone book for your local chapter.  Suicide Awareness Voices of  Education (SAVE) (www.save.org): 792-002-UXTV (7283)  National Suicide Prevention Line (www.mentalhealthmn.org): 236-560-XXEZ (4140)  Mental Health Consumer/Survivor Network of [place] (www.mhcsn.net): 831.509.1388 or 422-748-3395  Mental Health Association of [place] (www.mentalhealth.org): 263.133.1795 or 638-770-2826   Substance Abuse and Mental Health Services (www.samhsa.gov)  [place] Opioid Prevention Coalition: www.opioidcoalition.org    [place] Recovery Connection (MRC)  [facility] connects people seeking recovery to resources that help foster and sustain long-term recovery.  Whether you are seeking resources for treatment, transportation, housing, job training, education, health care or other pathways to recovery, [facility] is a great place to start.  226.588.4896.  www.[place]arec"[employer] Pod casts for nutrition and wellness  Listen on Apple Podcasts  [employer]   Nutrition       Understand the connection between what you eat and how you feel. Hosted by licensed nutritionists and dietitians from [employer] we share practical, real-life solutions for healthier living through nutrition.     General Medication Instructions:   See your medication sheet(s) for instructions.   Take all medications as prescribed.  Make no changes unless your primary care provider suggests them.   Go to all your primary care provider visits.  Be sure to have all your required lab tests. This way, your medicines can be refilled on time.  Do not use any forms of alcohol.    Please Note:  If you have any questions at anytime after you are discharged please call [facility] detox unit 3AW at [phone].  [facility], Behavioral Intake [phone]  Medical Records call [phone]  Outpatient Behavioral Intake call [phone]  LP+ Wait List/Bed Availability call [phone]    Please remember to take all of your behavioral discharge planning and lab paperwork  to any follow up appointments, it contains your lab results, diagnosis, medication list and discharge recommendations.      THANK YOU FOR CHOOSING [employer] [place]

## 2021-09-20 NOTE — PROGRESS NOTES
Pt given copy of their discharge instructions and medication administration instructions. All discharge plans and labs were discussed with patient. Pt reports no questions at this time regarding discharge plans, labs or medications. Pt denies any suicidal ideation, plans or intent at this time. Patient discharge assisted via [facility] directly to [facility]. Patient plan is to begin treatment today at [facility]. He does still need to set up a follow up appointment but states he will do so once he gets settled into [facility]. Patient is discharged at this time.

## 2021-09-20 NOTE — PROGRESS NOTES
[facility]  [address] [place] [address] 5th and 6th [place] [address]        ADULT CD ASSESSMENT ADDENDUM      Patient Name: Paulo Franco  Cell Phone:   Home: [phone] (home)    Mobile:   Telephone Information:   Mobile [phone]       Email:  [email or web address]  Emergency Contact: Jose Mathis- girlfriend   Tel: [phone]    The patient reported being:  Single, in a serious relationship    With which race do you identify? White    Initial Screening Questions     1. Are you currently having severe withdrawal symptoms that are putting yourself or others in danger?  No    2. Are you currently having severe medical problems that require immediate attention?  No    3. Are you currently having severe emotional or behavioral problems that are putting yourself or others at risk of harm?  No    4. Do you currently participate in community shahram activities, such as attending Cheondoism, temple, Taoism or Taoism services?  No    5. How does your spirituality impact your recovery?  It's very important    6. Do you currently self-administer your medications?  Yes    Do you have a valid 's license?    Yes       Mental Health Status   Physical Appearance/Attire: Appears stated age, Attire appropriate to age/situation and Multiple layers   Hygiene: well groomed   Eye Contact: at examiner   Speech Rate:  regular   Speech Volume: regular   Speech Quality: fluid   Cognitive/Perceptual:  reality based   Cognition: memory intact    Judgment: intact and able to concentrate   Insight: intact and able to concentrate   Orientation:  time, place, person and situation   Thought: logical    Hallucinations:  none   General Behavioral Tone: cooperative   Psychomotor Activity: no problem noted   Gait:  no problem   Mood: normal and appropriate   Affect: congruence/appropriate   Counselor Notes: NA     Criteria for Diagnosis: DSM-5 Criteria for Substance Use Disorders      Alcohol Use  Disorder Severe - 303.90 (F10.20)    Level of Care   I.) Intoxication and Withdrawal: 0   II.) Biomedical:  1   III.) Emotional and Behavioral:  2   IV.) Readiness to Change:  2   V.) Relapse Potential: 4   VI.) Recovery Environmental: 4     Initial Problem List     The patient lacks relapse prevention skills  The patient has poor coping skills  The patient has poor refusal skills   The patient has a tendency to isolate  The patient has a significant history of grief and loss issues    Patient/Client is willing to follow treatment recommendations.  Yes    Counselor: DAVID Clemons

## 2021-09-21 ENCOUNTER — HOSPITAL ENCOUNTER (OUTPATIENT)
Dept: BEHAVIORAL HEALTH | Facility: CLINIC | Age: 46
End: 2021-09-21
Attending: FAMILY MEDICINE
Payer: COMMERCIAL

## 2021-09-21 VITALS — OXYGEN SATURATION: 99 % | TEMPERATURE: 97.9 F

## 2021-09-21 PROCEDURE — 1002N00001 HC LODGING PLUS FACILITY CHARGE ADULT

## 2021-09-21 PROCEDURE — H2035 A/D TX PROGRAM, PER HOUR: HCPCS | Mod: HQ

## 2021-09-21 ASSESSMENT — ANXIETY QUESTIONNAIRES: GAD7 TOTAL SCORE: 7

## 2021-09-21 NOTE — GROUP NOTE
Group Therapy Documentation    PATIENT'S NAME: Paulo Franco  MRN:   [id number]  :   1975  ACCT. NUMBER: [id number]  DATE OF SERVICE: 21  START TIME: 12:30 PM  END TIME:  2:30 PM  FACILITATOR(S): Mac Walker LADC; Jaiden Millan LADC  TOPIC: BEH Group Therapy  Number of patients attending the group:  8  Group Length:  2 Hours    Group Therapy Type: Recovery strategies    Summary of Group / Topics Discussed:    Recovery Principles, Sober coping skills, Disease of addiction, and Relapse prevention      Group Attendance:  Attended group session    Patient's response to the group topic/interactions:  cooperative with task    Patient appeared to be Actively participating, Attentive and Engaged.        Client specific details:  Antonio gave appropriate feedback..

## 2021-09-21 NOTE — PROGRESS NOTES
Patient:  Paulo Franco    Date: September 21, 2021    Comprehensive Assessment UPDATE       Comprehensive Summary Update and Review  Counselor met with patient on 9-21-21 and reviewed the Comprehensive Assessment.    There were no changes/updates identified by patient or in chart entries.

## 2021-09-21 NOTE — GROUP NOTE
Group Therapy Documentation    PATIENT'S NAME: Paulo Franco  MRN:   [id number]  :   1975  ACCT. NUMBER: [id number]  DATE OF SERVICE: 21  START TIME:  9:00 AM  END TIME: 11:00 AM  FACILITATOR(S): Jaiden Millan LADC; Mac Walker LADC; Franklin Roche  TOPIC: BEH Group Therapy  Number of patients attending the group:  9  Group Length:  2 Hours    Group Therapy Type: Recovery strategies    Summary of Group / Topics Discussed:    Recovery Principles      Group Attendance:  Attended group session    Patient's response to the group topic/interactions:  cooperative with task    Patient appeared to be Attentive.        Client specific details:  Antonio attended AM group.Patient checked in, took part in a discussion on the 1st step, and helped graduate a peer.

## 2021-09-21 NOTE — PROGRESS NOTES
Comprehensive Assessment Summary     Based on client interview, review of previous assessments and   comprehensive assessment interview the following diagnosis and recommendations are:     Patient: Paulo Franco  MRN; [id number]   : 1975  Age: 45 year old Sex: male       Client meets criteria for:  303.90 Alcohol Dependence    Dimension One: Acute Intoxication/Withdrawal Potential     Ratin    (Consider the client's ability to cope with withdrawal symptoms and current state of intoxication)   No issues.    Dimension Two: Biomedical Condition and Complications    Ratin   (Consider the degree to which any physical disorder would interfere with treatment for substance abuse, and the client's ability to tolerate any related discomfort; determine the impact of continued chemical use on the unborn child if the client is pregnant)   Pt.has no medical concerns that would prevent him from completing treatment.    Dimension Three: Emotional/Behavioral/Cognitive Conditions & Complications  Ratin   (Determine the degree to which any condition or complications are likely to interfere with treatment for substance abuse or with functioning in significant life areas and the likelihood of risk of harm to self or others)    Pt.may have grief and loss issues over the deaths of his father and brother. Pt.participated in a suicide risk screening as part of his CD assessment and was given a risk factor of no/low. He will complete a safty plan as part of his treatment plan. He lacks sober coping skills and impulse control. Pt.reports he has had no abuse or trauma.      Dimension Four: Treatment Acceptance/Resistance     Ratin   (Consider the amount of support and encouragement necessary to keep the client involved in treatment)   Pt.lacks internal motivation to change his life style to maintain sobriety. Pt.lacks insight and acceptance of the first step. He participated in IOP at [facility] recently with little or no  sobriety. He does report staying sober 2 years in the past. Pt.seems willing at this time to participate in the treatment process.      Dimension Five: Continued Use/Relaspe Prevention     Ratin 4  (Consider the degree to which the client's recognizes relapse issues and has the skills to prevent relapse of either substance use or mental health problems)   Pt.lacks insight into his relapse process. He lacks awareness of his high risk using situations. He lacks sober coping skills and impulse control. He lacks long term sober maintenance skills. Pt.lacks a relapse prevention plan. Pt.reports some attendance at AA meetings in the past. He lacks awareness of his relapse triggers and warning signs.He continues to be a high risk to relapse.     Dimension Six: Recovery Environment     Ratin    (Consider the degree to which key areas of the client's life are supportive of or antagonistic to treatment participation and recovery)   Pt.reports being homeless and unemployed. He lacks a sober support system. He is on probation in [employer] For domestic and terrorist threats.Pt.does not have a sponsor. Pt.has some past AA experience but nothing recent.     I have reviewed the information on the assessment, psychosocial and medical history and checklist:        it is current

## 2021-09-21 NOTE — GROUP NOTE
Group Therapy Documentation    PATIENT'S NAME: Paulo Franco  MRN:   [id number]  :   1975  ACCT. NUMBER: [id number]  DATE OF SERVICE: 21  START TIME:  3:00 PM  END TIME:  4:00 PM  FACILITATOR(S): Agnieszka Birch LAD; Deuce Robbins [facility]; Jona Clay LADC  TOPIC: BEH Group Therapy  Number of patients attending the group:  28  Group Length:  1 Hours    Group Therapy Type: Recovery strategies    Summary of Group / Topics Discussed:    Recovery Principles, Relationship/socialization, and Balanced lifestyle      Group Attendance:  Attended group session    Patient's response to the group topic/interactions:  cooperative with task    Patient appeared to be Actively participating, Attentive and Engaged.        Client specific details:  .

## 2021-09-22 ENCOUNTER — HOSPITAL ENCOUNTER (OUTPATIENT)
Dept: BEHAVIORAL HEALTH | Facility: CLINIC | Age: 46
End: 2021-09-22
Attending: FAMILY MEDICINE
Payer: COMMERCIAL

## 2021-09-22 VITALS — TEMPERATURE: 98.2 F | OXYGEN SATURATION: 98 %

## 2021-09-22 PROBLEM — F19.20 CHEMICAL DEPENDENCY (H): Status: ACTIVE | Noted: 2021-09-22

## 2021-09-22 PROCEDURE — 1002N00001 HC LODGING PLUS FACILITY CHARGE ADULT

## 2021-09-22 PROCEDURE — H2035 A/D TX PROGRAM, PER HOUR: HCPCS | Mod: HQ

## 2021-09-22 NOTE — GROUP NOTE
Group Therapy Documentation    PATIENT'S NAME: Paulo Franco  MRN:   [id number]  :   1975  ACCT. NUMBER: [id number]  DATE OF SERVICE: 21  START TIME:  8:30 AM  END TIME:  9:30 AM  FACILITATOR(S): Jaiden Millan LADC; Mac Walker LADC  TOPIC: BEH Group Therapy  Number of patients attending the group:  26  Group Length:  1 Hour    Group Therapy Type: Recovery strategies    Summary of Group / Topics Discussed:    Recovery Principles      Group Attendance:  Attended group session    Patient's response to the group topic/interactions:  cooperative with task    Patient appeared to be Attentive.        Client specific details:  Paulo attended AM lecture. Patient took part in a discussion on relapse and the promises of recovery.

## 2021-09-22 NOTE — GROUP NOTE
Group Therapy Documentation    PATIENT'S NAME: Paulo Franco  MRN:   [id number]  :   1975  ACCT. NUMBER: [id number]  DATE OF SERVICE: 21  START TIME:  9:45 AM  END TIME: 11:20 AM  FACILITATOR(S): Franklin Roche; Mac Walker LADC; Jaiden Millan LADC  TOPIC: BEH Group Therapy  Number of patients attending the group:  9  Group Length:  2 Hours    Group Therapy Type: Recovery strategies    Summary of Group / Topics Discussed:    Recovery Principles, Spiritual Care, Sober coping skills, Relationship/socialization, Balanced lifestyle, Co-occurring illnesses symptom management, Trauma informed care, Disease of addiction, Emotions/expression, Leisure explorations/use of leisure time, Medication management, Relapse prevention, and Self-care activities      Group Attendance:  Attended group session    Patient's response to the group topic/interactions:  cooperative with task    Patient appeared to be Attentive and Engaged.        Client specific details:  Paulo attended morning group.  He checked in, and actively participated in the group discussion.

## 2021-09-22 NOTE — PROGRESS NOTES
Acknowledgement of Current Treatment Plan - Initial Treatment Plan     INITIAL TREATMENT PLAN:     1. I have participated in creating my treatment plan with my therapist / counselor on _[phone]_________.     I agree with the plan as it is written in the electronic health record.    Name Signature/Date   Patient     Name of Therapist / Counselor Signature/Date   Counselor/Therapist        2. I have completed and reviewed my Safety Plan with my counselor and signed this on _6-07-99________. I have been given the hard copy of this plan.    Patient signature/date:      _____________________________________________________________________________    3. Last Use Date: _3-25-88_________    Patient signature/date:     _____________________________________________________________________________

## 2021-09-22 NOTE — GROUP NOTE
Group Therapy Documentation    PATIENT'S NAME: Paulo Franco  MRN:   [id number]  :   1975  ACCT. NUMBER: [id number]  DATE OF SERVICE: 21  START TIME: 12:30 PM  END TIME:  2:30 PM  FACILITATOR(S): Jaiden Millan LADC; Azalia Leong; Franklin Roche  TOPIC: BEH Group Therapy  Number of patients attending the group:  9  Group Length:  2 Hours    Group Therapy Type: Recovery strategies    Summary of Group / Topics Discussed:    Recovery Principles      Group Attendance:  Attended group session    Patient's response to the group topic/interactions:  cooperative with task    Patient appeared to be Attentive.        Client specific details:  Antonio attended PM Spiritual Care group. He took part in an exercise/discussion on the benefits of spirituality in recovery.

## 2021-09-23 ENCOUNTER — HOSPITAL ENCOUNTER (OUTPATIENT)
Dept: BEHAVIORAL HEALTH | Facility: CLINIC | Age: 46
End: 2021-09-23
Attending: FAMILY MEDICINE
Payer: COMMERCIAL

## 2021-09-23 VITALS — TEMPERATURE: 98.2 F | OXYGEN SATURATION: 98 %

## 2021-09-23 PROCEDURE — 1002N00001 HC LODGING PLUS FACILITY CHARGE ADULT

## 2021-09-23 PROCEDURE — H2035 A/D TX PROGRAM, PER HOUR: HCPCS | Mod: HQ

## 2021-09-23 NOTE — GROUP NOTE
Group Therapy Documentation    PATIENT'S NAME: Paulo Franco  MRN:   [id number]  :   1975  ACCT. NUMBER: [id number]  DATE OF SERVICE: 21  START TIME:  9:00 AM  END TIME: 11:00 AM  FACILITATOR(S): Jaiden Millan LADC; Mac Walker LADC; Franklin Roche  TOPIC: BEH Group Therapy  Number of patients attending the group:  7  Group Length:  2 Hours    Group Therapy Type: Recovery strategies    Summary of Group / Topics Discussed:    Recovery Principles      Group Attendance:  Attended group session    Patient's response to the group topic/interactions:  cooperative with task    Patient appeared to be Attentive.        Client specific details:  Antonio attended AM group. Patient checked in, was attentive and cooperative. Patient gave a peer appropriate feedback on a presented assignment, and also helped graduate a peer.

## 2021-09-23 NOTE — GROUP NOTE
Group Therapy Documentation    PATIENT'S NAME: Paulo Franco  MRN:   [id number]  :   1975  ACCT. NUMBER: [id number]  DATE OF SERVICE: 21  START TIME: 12:30 PM  END TIME:  2:30 PM  FACILITATOR(S): Mac Walker LADC; Jaiden Millan LADC  TOPIC: BEH Group Therapy  Number of patients attending the group:  7  Group Length:  2 Hours    Group Therapy Type: Recovery strategies and Emotion processing    Summary of Group / Topics Discussed:    Recovery Principles, Sober coping skills, Balanced lifestyle, and Relapse prevention      Group Attendance:  Attended group session    Patient's response to the group topic/interactions:  cooperative with task and expressed readiness to alter behaviors    Patient appeared to be Actively participating, Attentive and Engaged.        Client specific details:  Antonio gave appropriate feedback..

## 2021-09-23 NOTE — GROUP NOTE
Group Therapy Documentation    PATIENT'S NAME: Paulo Franco  MRN:   [id number]  :   1975  ACCT. NUMBER: [id number]  DATE OF SERVICE: 21  START TIME:  3:00 PM  END TIME:  4:00 PM  FACILITATOR(S): Jona Clay LADC  TOPIC: BEH Group Therapy  Number of patients attending the group:  7  Group Length:  1 Hours    Group Therapy Type: Health and wellbeing     Summary of Group / Topics Discussed:    Disease of addiction      Group Attendance:  Attended group session    Patient's response to the group topic/interactions:  cooperative with task    Patient appeared to be Actively participating.        Client specific details:  Paulo participated and interacted appropriately with peers and staff in skills group. No triggers to use noted or discussed.

## 2021-09-24 ENCOUNTER — HOSPITAL ENCOUNTER (OUTPATIENT)
Dept: BEHAVIORAL HEALTH | Facility: CLINIC | Age: 46
End: 2021-09-24
Attending: FAMILY MEDICINE
Payer: COMMERCIAL

## 2021-09-24 VITALS — OXYGEN SATURATION: 98 % | TEMPERATURE: 98.5 F

## 2021-09-24 PROCEDURE — H2035 A/D TX PROGRAM, PER HOUR: HCPCS | Mod: HQ

## 2021-09-24 PROCEDURE — 1002N00001 HC LODGING PLUS FACILITY CHARGE ADULT

## 2021-09-24 NOTE — GROUP NOTE
Group Therapy Documentation    PATIENT'S NAME: Paulo Franco  MRN:   [id number]  :   1975  ACCT. NUMBER: [id number]  DATE OF SERVICE: 21  START TIME:  9:00 AM  END TIME: 11:00 AM  FACILITATOR(S): Jaiden Millan LADC  TOPIC: BEH Group Therapy  Number of patients attending the group:  7  Group Length:  2 Hours    Group Therapy Type: Recovery strategies    Summary of Group / Topics Discussed:    Recovery Principles      Group Attendance:  Attended group session    Patient's response to the group topic/interactions:  cooperative with task    Patient appeared to be Attentive.        Client specific details:  Antonio attended AM group. Patient took part in a discussion regarding what success means to him. Patient also checked in, and gave a peer feedback on a presented assignment  .

## 2021-09-24 NOTE — GROUP NOTE
Group Therapy Documentation    PATIENT'S NAME: Paulo Franco  MRN:   [id number]  :   1975  ACCT. NUMBER: [id number]  DATE OF SERVICE: 21  START TIME: 12:30 PM  END TIME:  2:30 PM  FACILITATOR(S): Jaiden Millan LADC  TOPIC: BEH Group Therapy  Number of patients attending the group:  7  Group Length:  2 Hours    Group Therapy Type: Recovery strategies    Summary of Group / Topics Discussed:    Recovery Principles      Group Attendance:  Attended group session    Patient's response to the group topic/interactions:  cooperative with task    Patient appeared to be Attentive.        Client specific details:  Antonio attended PM. The group topic was overcoming adversity in recovery.

## 2021-09-25 ENCOUNTER — HOSPITAL ENCOUNTER (OUTPATIENT)
Dept: BEHAVIORAL HEALTH | Facility: CLINIC | Age: 46
End: 2021-09-25
Attending: FAMILY MEDICINE
Payer: COMMERCIAL

## 2021-09-25 VITALS — TEMPERATURE: 97.8 F | OXYGEN SATURATION: 96 %

## 2021-09-25 PROCEDURE — 1002N00001 HC LODGING PLUS FACILITY CHARGE ADULT

## 2021-09-25 PROCEDURE — H2035 A/D TX PROGRAM, PER HOUR: HCPCS | Mod: HQ

## 2021-09-25 NOTE — GROUP NOTE
Group Therapy Documentation    PATIENT'S NAME: Paulo Franco  MRN:   [id number]  :   1975  ACCT. NUMBER: [id number]  DATE OF SERVICE: 21  START TIME: 12:30 PM  END TIME:  2:30 PM  FACILITATOR(S): Agnieszka Birch ADC-T; Janusz Montoya [facility]; [facility] Walker [facility]  TOPIC: BEH Group Therapy  Number of patients attending the group:  26  Group Length:  2 Hours    Group Therapy Type: Recovery strategies and Daily living/independence skills    Summary of Group / Topics Discussed:    Spiritual Care and Relapse prevention    Group Attendance:  Attended group session    Patient's response to the group topic/interactions:  cooperative with task    Patient appeared to be Attentive and Engaged.        Client specific details:  Pt attended a lecture about relapse prevention as a component of spiritual health.

## 2021-09-25 NOTE — GROUP NOTE
Psychoeducation Group Documentation    PATIENT'S NAME: Paulo Franco  MRN:   [id number]  :   1975  ACCT. NUMBER: [id number]  DATE OF SERVICE: 21  START TIME: 12:30 PM  END TIME:  2:30 PM  FACILITATOR(S): Mac Walker LADC  TOPIC: BEH Pyschoeducation  Number of patients attending the group:  8  Group Length:  2 Hours    Skills Group Therapy Type: Recovery skills    Summary of Group / Topics Discussed:    Relapse prevention skills          Group Attendance:  Attended group session    Patient's response to the group topic/interactions:  cooperative with task    Patient appeared to be Attentive.         Client specific details:  Antonio gave appropriate feedback..

## 2021-09-25 NOTE — PROGRESS NOTES
Patient:  Paulo Franco    ATTENDANCE for the following date span:  9/20/21 - 9/26/21  (date, type, and amount of each treatment service completed)       Day Monday Tuesday Wednesday Thursday Friday Saturday Pablo   Group Hours   2 4 4 4 4 4 2   Skills Hours    1 1 1   1   Individual Session (LADC)   .5 .5        Individual Session  (psychotherapy)            Peer-led Recovery Group   1 1 1 1 1 1 1               Adult CD Progress Note and Treatment Plan Review     Attendance  Please refer to OP BEH CD Adult Attendance Record Documentation Flowsheet    Support group attended this week: yes    Reporting sobriety:  yes        Treatment Plan     Treatment Plan Review competed on: 9-26-21       Client preferred learning style: Visual  Hands on  Verbal    Staff Members contributing DAVID Thompson, DAVID Her.                     Received Supervision: No    Client: contributed to goals and plan.    Client received copy of plan/revised plan: Yes    Client agrees with plan/revised plan: Yes        Changes to Treatment Plan: No    New Goals added since last review None needed.    Goals worked on since last review See ASAM notes below.    Strategies effective: yes    Strategies need these changes: None needed.    1) Care Coordination Activities:  None.  2) Medical, Mental Health and other appointments the client attended: None.  3) Medication issues: None.  4) Physical and mental health problems: Pt.reports he has had no issues in this area.  5) Any changes in Vulnerable Adult Status?  No If yes, add to treatment plan and individual abuse prevention plan.  6) Review and evaluation of the individual abuse prevention plan: Current IAPP for this program is adequate for this client          ASAM Risk Rating:    Dimension 1 0 No change.    Dimension 2 0 No change    Dimension 3 2 Pt.reports he has had no suicidal ideation. He continues to work on his goal of gaining insight into how his addiction has affected his mental  health. Pt.reports he has less anxiety since entering treatment and being sober.     Dimension 4 2 Antonio has been attending all groups and lectures. He presented his first step assignment He continues to work on increasing his internal motivation to change his life style to maintain sobriety.     Dimension 5 4 Antonio lacks insight into his relapse warning signs and triggers. He continues to work on identifying his high risk situations. Pt.attended the relapse prevention workshop.       Dimension 6 4 Antonio has been atteneding 12 step meetings while in treatment. He needs to obtain a sponsor. He is working with program case manage on aftercare plan.       Guide to Risk Ratings for Suicidality:   IDEATION: Active thoughts of suicide? INTENT: Intent to follow on suicide? PLAN: Plan to follow through on suicide? Level of Risk:   IF Yes Yes Yes Patient = High Emergent   IF Yes Yes No Patient = High Urgent/Non-Emergent   IF Yes No No Patient = Moderate Non-Urgent   IF No No   No Patient = Low Risk   The patient's ADDITIONAL RISK FACTORS and lack of PROTECTIVE FACTORS may increase their overall suicide risk ratings.     Patient's/client's current risk rating:  Low Risk    Family Involvement:   ADILIA signed    Data:   offered feedback good insight client did actively participate      Intervention:   Aftercare planning  Behavior modification  Counselor feedback  Education  Emotional management  Group feedback  Relapse prevention  Twelve Step facilitation  Mental health education      Assessment:   Stages of Change Model  Preparation/Determination    Appears/Sounds:  Cooperative  Motivated  Engaged      Plan:  Contine group therapy.      DAVID Keith

## 2021-09-25 NOTE — GROUP NOTE
Psychoeducation Group Documentation    PATIENT'S NAME: Paulo Franco  MRN:   [id number]  :   1975  ACCT. NUMBER: [id number]  DATE OF SERVICE: 21  START TIME:  9:00 AM  END TIME: 11:00 AM  FACILITATOR(S): Mac Walker LADC  TOPIC: BEH Pyschoeducation  Number of patients attending the group:  8  Group Length:  2 Hours    Skills Group Therapy Type: Recovery skills    Summary of Group / Topics Discussed:    Relapse prevention skills          Group Attendance:  Attended group session    Patient's response to the group topic/interactions:  cooperative with task    Patient appeared to be Attentive.         Client specific details:  Antonio gave appropriate feedback..

## 2021-09-26 ENCOUNTER — HOSPITAL ENCOUNTER (OUTPATIENT)
Dept: BEHAVIORAL HEALTH | Facility: CLINIC | Age: 46
End: 2021-09-26
Attending: FAMILY MEDICINE
Payer: COMMERCIAL

## 2021-09-26 VITALS — OXYGEN SATURATION: 95 % | TEMPERATURE: 98.3 F

## 2021-09-26 PROCEDURE — H2035 A/D TX PROGRAM, PER HOUR: HCPCS | Mod: HQ

## 2021-09-26 PROCEDURE — 1002N00001 HC LODGING PLUS FACILITY CHARGE ADULT

## 2021-09-26 NOTE — GROUP NOTE
Psychoeducation Group Documentation    PATIENT'S NAME: Paulo Franco  MRN:   [id number]  :   1975  ACCT. NUMBER: [id number]  DATE OF SERVICE: 21  START TIME:  8:40 AM  END TIME: 10:30 AM  FACILITATOR(S): Mac Walker LADC; Regina Villar RN  TOPIC: BEH Pyschoeducation  Number of patients attending the group:  8  Group Length:  2 Hours    Skills Group Therapy Type: Healthy behaviors development    Summary of Group / Topics Discussed:    Relationship/social skills and Balanced lifestyle skills          Group Attendance:  Attended group session    Patient's response to the group topic/interactions:  cooperative with task    Patient appeared to be Actively participating and Attentive.         Client specific details:  Antonio gave appropriate feedback..

## 2021-09-26 NOTE — GROUP NOTE
Psychoeducation Group Documentation    PATIENT'S NAME: Paulo Franco  MRN:   [id number]  :   1975  ACCT. NUMBER: [id number]  DATE OF SERVICE: 21  START TIME: 12:30 PM  END TIME:  1:30 PM  FACILITATOR(S): [facility]; Agnieszka Birch LADC  TOPIC: BEH Pyschoeducation  Number of patients attending the group:  8  Group Length:  1 Hours    Skills Group Therapy Type: Recovery skills    Summary of Group / Topics Discussed:    Relationship/social skills and Balanced lifestyle skills          Group Attendance:  Attended group session    Patient's response to the group topic/interactions:  cooperative with task    Patient appeared to be Actively participating, Attentive and Engaged.         Client specific details:  Antonio gave appropriate feedback..

## 2021-09-27 ENCOUNTER — HOSPITAL ENCOUNTER (OUTPATIENT)
Dept: BEHAVIORAL HEALTH | Facility: CLINIC | Age: 46
End: 2021-09-27
Attending: FAMILY MEDICINE
Payer: COMMERCIAL

## 2021-09-27 VITALS — TEMPERATURE: 97.9 F | OXYGEN SATURATION: 97 %

## 2021-09-27 PROCEDURE — 1002N00001 HC LODGING PLUS FACILITY CHARGE ADULT

## 2021-09-27 PROCEDURE — H2035 A/D TX PROGRAM, PER HOUR: HCPCS | Mod: HQ

## 2021-09-27 NOTE — GROUP NOTE
Group Therapy Documentation    PATIENT'S NAME: Paulo Franco  MRN:   [id number]  :   1975  ACCT. NUMBER: [id number]  DATE OF SERVICE: 21  START TIME: 12:30 PM  END TIME:  2:30 PM  FACILITATOR(S): Franklin Roche; Azalia Leong  TOPIC: BEH Group Therapy  Number of patients attending the group:  8  Group Length:  2 Hours    Group Therapy Type: Recovery strategies, Emotion processing, Daily living/independence skills, and Health and wellbeing     Summary of Group / Topics Discussed:    Recovery Principles, Spiritual Care, Sober coping skills, Relationship/socialization, Balanced lifestyle, Cognitive behavioral therapy skills, Mindfulness/Relaxation, Coping/DBT informed care, Trauma informed care, Emotions/expression, Leisure explorations/use of leisure time, and Self-care activities      Group Attendance:  Attended group session    Patient's response to the group topic/interactions:  cooperative with task    Patient appeared to be Attentive and Engaged.        Client specific details:  Paulo attended afternoon group for Spirituality.  He participated in the discussions and activities.

## 2021-09-27 NOTE — GROUP NOTE
Group Therapy Documentation    PATIENT'S NAME: Paulo Franco  MRN:   [id number]  :   1975  ACCT. NUMBER: [id number]  DATE OF SERVICE: 21  START TIME:  9:00 AM  END TIME: 11:00 AM  FACILITATOR(S): Mac Walker LADC  TOPIC: BEH Group Therapy  Number of patients attending the group:  8  Group Length:  2 Hours    Group Therapy Type: Recovery strategies and Emotion processing    Summary of Group / Topics Discussed:    Recovery Principles, Sober coping skills, Balanced lifestyle, Disease of addiction, Emotions/expression, and Relapse prevention      Group Attendance:  Attended group session    Patient's response to the group topic/interactions:  cooperative with task    Patient appeared to be Actively participating, Attentive and Engaged.        Client specific details:  Antonio gave appropriate feedback..

## 2021-09-28 ENCOUNTER — HOSPITAL ENCOUNTER (OUTPATIENT)
Dept: BEHAVIORAL HEALTH | Facility: CLINIC | Age: 46
End: 2021-09-28
Attending: FAMILY MEDICINE
Payer: COMMERCIAL

## 2021-09-28 VITALS — OXYGEN SATURATION: 98 % | TEMPERATURE: 97.2 F

## 2021-09-28 PROCEDURE — H2035 A/D TX PROGRAM, PER HOUR: HCPCS | Mod: HQ

## 2021-09-28 PROCEDURE — 1002N00001 HC LODGING PLUS FACILITY CHARGE ADULT

## 2021-09-28 NOTE — GROUP NOTE
Group Therapy Documentation    PATIENT'S NAME: Paulo Franco  MRN:   [id number]  :   1975  ACCT. NUMBER: [id number]  DATE OF SERVICE: 21  START TIME:  3:00 PM  END TIME:  4:00 PM  FACILITATOR(S): Bonnie Navarro LADC; Mariam Ferro; Ree Segovia LADC  TOPIC: BEH Group Therapy  Number of patients attending the group: 19  Group Length:  1 Hours    Group Therapy Type: Recovery strategies    Summary of Group / Topics Discussed:    Disease of addiction and Leisure explorations/use of leisure time      Group Attendance:  Attended group session    Patient's response to the group topic/interactions:  cooperative with task    Patient appeared to be Attentive and Engaged.        Client specific details: Paulo was an active participant in skills group exploring the disease of addiction.

## 2021-09-28 NOTE — GROUP NOTE
Group Therapy Documentation    PATIENT'S NAME: Paulo Franco  MRN:   [id number]  :   1975  ACCT. NUMBER: [id number]  DATE OF SERVICE: 21  START TIME: 12:30 PM  END TIME:  2:30 PM  FACILITATOR(S): Jaiden Millan [facility]; Mac Walker LADC  TOPIC: BEH Group Therapy  Number of patients attending the group:  6  Group Length:  2 Hours    Group Therapy Type: Recovery strategies and Emotion processing    Summary of Group / Topics Discussed:    Recovery Principles, Sober coping skills, Balanced lifestyle, Disease of addiction, and Relapse prevention      Group Attendance:  Attended group session    Patient's response to the group topic/interactions:  cooperative with task and expressed readiness to alter behaviors    Patient appeared to be Actively participating, Attentive and Engaged.        Client specific details:  Antonio presented his first step assignment. .

## 2021-09-28 NOTE — GROUP NOTE
Group Therapy Documentation    PATIENT'S NAME: Paulo Franco  MRN:   [id number]  :   1975  ACCT. NUMBER: [id number]  DATE OF SERVICE: 21  START TIME:  9:00 AM  END TIME: 11:00 AM  FACILITATOR(S): Jaiden Millan LADC; Mac Walker LADC; Franklin Roche  TOPIC: BEH Group Therapy  Number of patients attending the group:  6  Group Length:  2 Hours    Group Therapy Type: Recovery strategies    Summary of Group / Topics Discussed:    Recovery Principles      Group Attendance:  Attended group session    Patient's response to the group topic/interactions:  cooperative with task    Patient appeared to be Attentive.        Client specific details:  Antonio attended AM group. Patient checked in, gave peers feedback on assignments. Patient also helped  peers.

## 2021-09-29 ENCOUNTER — HOSPITAL ENCOUNTER (OUTPATIENT)
Dept: BEHAVIORAL HEALTH | Facility: CLINIC | Age: 46
End: 2021-09-29
Attending: FAMILY MEDICINE
Payer: COMMERCIAL

## 2021-09-29 VITALS — TEMPERATURE: 98 F | OXYGEN SATURATION: 100 %

## 2021-09-29 PROCEDURE — 1002N00001 HC LODGING PLUS FACILITY CHARGE ADULT

## 2021-09-29 PROCEDURE — H2035 A/D TX PROGRAM, PER HOUR: HCPCS | Mod: HQ

## 2021-09-29 NOTE — GROUP NOTE
Group Therapy Documentation    PATIENT'S NAME: Paulo Franco  MRN:   [id number]  :   1975  ACCT. NUMBER: [id number]  DATE OF SERVICE: 21  START TIME:  9:45 AM  END TIME: 11:20 AM  FACILITATOR(S): Jaiden Millan LADC; Mac Walker LADC; Franklin Roche  TOPIC: BEH Group Therapy  Number of patients attending the group:  6  Group Length:  2 Hours    Group Therapy Type: Recovery strategies    Summary of Group / Topics Discussed:    Recovery Principles      Group Attendance:  Attended group session    Patient's response to the group topic/interactions:  cooperative with task    Patient appeared to be Attentive.        Client specific details:  Antonio attended AM group. Patient checked in, took part in a group discussion, and gave patient feedback on a presented assignment.

## 2021-09-29 NOTE — GROUP NOTE
Group Therapy Documentation    PATIENT'S NAME: Paulo Franco  MRN:   [id number]  :   1975  ACCT. NUMBER: [id number]  DATE OF SERVICE: 21  START TIME: 12:30 AM  END TIME:  2:30 PM  FACILITATOR(S): Jaiden Millan LADC; Mariam Ferro; Franklin Roche  TOPIC: BEH Group Therapy  Number of patients attending the group:  6  Group Length:  2 Hours    Group Therapy Type: Recovery strategies    Summary of Group / Topics Discussed:    Recovery Principles      Group Attendance:  Attended group session    Patient's response to the group topic/interactions:  cooperative with task    Patient appeared to be Attentive.        Client specific details:  Antonio attended PM group. Patient checked in, took part in a group discussion, and was attentive and engaged.

## 2021-09-29 NOTE — PROGRESS NOTES
Patient met with program  to review and initiate aftercare placement opportunities.  Patient reports that he is planning on returning to [facility] sober housing while attending [facility] outpatient services. Patient states that he has been in contact with both resources. Patient was informed that writer is available to asisst him with finalizing those arrangements.

## 2021-09-29 NOTE — GROUP NOTE
Psychoeducation Group Documentation    PATIENT'S NAME: Paulo Franco  MRN:   [id number]  :   1975  ACCT. NUMBER: [id number]  DATE OF SERVICE: 21  START TIME:  8:30 AM  END TIME:  9:30 AM  FACILITATOR(S): Mac Walker LADC; Caren Peralta  TOPIC: BEH Pyschoeducation  Number of patients attending the group:  6  Group Length:  1 Hours    Skills Group Therapy Type: Emotion regulation skills    Summary of Group / Topics Discussed:    Relationship/social skills          Group Attendance:  Attended group session    Patient's response to the group topic/interactions:  cooperative with task    Patient appeared to be Actively participating and Attentive.         Client specific details:  Antonio gave appropriate feedback..

## 2021-09-30 ENCOUNTER — HOSPITAL ENCOUNTER (OUTPATIENT)
Dept: BEHAVIORAL HEALTH | Facility: CLINIC | Age: 46
End: 2021-09-30
Attending: FAMILY MEDICINE
Payer: COMMERCIAL

## 2021-09-30 VITALS — OXYGEN SATURATION: 100 % | TEMPERATURE: 97.9 F

## 2021-09-30 PROCEDURE — 1002N00001 HC LODGING PLUS FACILITY CHARGE ADULT

## 2021-09-30 PROCEDURE — H2035 A/D TX PROGRAM, PER HOUR: HCPCS | Mod: HQ

## 2021-09-30 NOTE — GROUP NOTE
Group Therapy Documentation    PATIENT'S NAME: Paulo Franco  MRN:   [id number]  :   1975  ACCT. NUMBER: [id number]  DATE OF SERVICE: 21  START TIME:  3:00 PM  END TIME:  4:00 PM  FACILITATOR(S): Jona Clay LADC  TOPIC: BEH Group Therapy  Number of patients attending the group:  6  Group Length:  1 Hours    Group Therapy Type: Recovery strategies    Summary of Group / Topics Discussed:    Nutrition and balanced lifestyle.      Group Attendance:  Attended group session    Patient's response to the group topic/interactions:  cooperative with task    Patient appeared to be Actively participating.        Client specific details:  Paulo participated and interacted appropriately with peers and staff in skills group. No triggers to use noted or discussed.

## 2021-09-30 NOTE — GROUP NOTE
Group Therapy Documentation    PATIENT'S NAME: Paulo Franco  MRN:   [id number]  :   1975  ACCT. NUMBER: [id number]  DATE OF SERVICE: 21  START TIME:  9:00 AM  END TIME: 11:00 AM  FACILITATOR(S): Franklin Roche Daniel Ray, LADC  TOPIC: BEH Group Therapy  Number of patients attending the group:  6  Group Length:  2 Hours    Group Therapy Type: Recovery strategies    Summary of Group / Topics Discussed:    Recovery Principles, Balanced lifestyle, Cognitive behavioral therapy skills, Mindfulness/Relaxation, Emotions/expression, Leisure explorations/use of leisure time, and Self-care activities      Group Attendance:  Attended group session    Patient's response to the group topic/interactions:  cooperative with task    Patient appeared to be Attentive and Engaged.        Client specific details:  Paulo attended morning group.  He checked in, went on a mindfulness walk, and participated in group discussion on Cognitive Distortions.

## 2021-09-30 NOTE — GROUP NOTE
Group Therapy Documentation    PATIENT'S NAME: Paulo Franco  MRN:   [id number]  :   1975  ACCT. NUMBER: [id number]  DATE OF SERVICE: 21  START TIME: 12:30 PM  END TIME:  2:30 PM  FACILITATOR(S): Jaiden Millan LADC  TOPIC: BEH Group Therapy  Number of patients attending the group:  6  Group Length:  2 Hours    Group Therapy Type: Recovery strategies    Summary of Group / Topics Discussed:    Recovery Principles      Group Attendance:  Attended group session    Patient's response to the group topic/interactions:  cooperative with task    Patient appeared to be Attentive.        Client specific details:  Antonio attended PM group. He was attentive and engaged. Patient took part in a group discussion and gave peers feedback on presented assignments.

## 2021-10-01 ENCOUNTER — HOSPITAL ENCOUNTER (OUTPATIENT)
Dept: BEHAVIORAL HEALTH | Facility: CLINIC | Age: 46
End: 2021-10-01
Attending: FAMILY MEDICINE
Payer: COMMERCIAL

## 2021-10-01 VITALS — OXYGEN SATURATION: 98 % | TEMPERATURE: 98.2 F

## 2021-10-01 PROCEDURE — 1002N00001 HC LODGING PLUS FACILITY CHARGE ADULT

## 2021-10-01 PROCEDURE — H2035 A/D TX PROGRAM, PER HOUR: HCPCS | Mod: HQ

## 2021-10-01 NOTE — GROUP NOTE
Group Therapy Documentation    PATIENT'S NAME: Paulo Franco  MRN:   [id number]  :   1975  ACCT. NUMBER: [id number]  DATE OF SERVICE: 10/01/21  START TIME: 12:30 PM  END TIME:  2:30 PM  FACILITATOR(S): Jaiden Millan LADC; Mariam Ferro  TOPIC: BEH Group Therapy  Number of patients attending the group:  6  Group Length:  2 Hours    Group Therapy Type: Recovery strategies    Summary of Group / Topics Discussed:    Recovery Principles      Group Attendance:  Attended group session    Patient's response to the group topic/interactions:  cooperative with task    Patient appeared to be Attentive.        Client specific details:  Antonio attended PM group. Group topics included adversity, inner strength, and the power of support network..

## 2021-10-01 NOTE — GROUP NOTE
Group Therapy Documentation    PATIENT'S NAME: Paulo Franco  MRN:   [id number]  :   1975  ACCT. NUMBER: [id number]  DATE OF SERVICE: 10/01/21  START TIME:  9:00 AM  END TIME: 11:00 AM  FACILITATOR(S): Jaiden Millan LADC  TOPIC: BEH Group Therapy  Number of patients attending the group:  5  Group Length:  2 Hours    Group Therapy Type: Recovery strategies    Summary of Group / Topics Discussed:    Recovery Principles      Group Attendance:  Attended group session    Patient's response to the group topic/interactions:  cooperative with task    Patient appeared to be Attentive.        Client specific details:  Antonio attended AM group. Patient checked in, took part in a discussion on shame, stages of change, and helped to graduate a peer.

## 2021-10-02 ENCOUNTER — HOSPITAL ENCOUNTER (OUTPATIENT)
Dept: BEHAVIORAL HEALTH | Facility: CLINIC | Age: 46
End: 2021-10-02
Attending: FAMILY MEDICINE
Payer: COMMERCIAL

## 2021-10-02 VITALS — TEMPERATURE: 98.1 F | OXYGEN SATURATION: 97 %

## 2021-10-02 PROCEDURE — H2035 A/D TX PROGRAM, PER HOUR: HCPCS | Mod: HQ

## 2021-10-02 PROCEDURE — 1002N00001 HC LODGING PLUS FACILITY CHARGE ADULT

## 2021-10-02 NOTE — GROUP NOTE
Group Therapy Documentation    PATIENT'S NAME: Paulo Franco  MRN:   [id number]  :   1975  ACCT. NUMBER: [id number]  DATE OF SERVICE: 10/02/21  START TIME: 12:30 PM  END TIME:  2:00 PM  FACILITATOR(S): Jose Angel Sharma LADC  TOPIC: BEH Group Therapy  Number of patients attending the group:  20  Group Length:  1.5 Hours    Group Therapy Type: Recovery strategies    Summary of Group / Topics Discussed:    Relationship/socialization      Group Attendance:  Attended group session    Patient's response to the group topic/interactions:  cooperative with task    Patient appeared to be Actively participating.        Client specific details:  Patient shared personal aspects on relationships..

## 2021-10-02 NOTE — GROUP NOTE
Group Therapy Documentation    PATIENT'S NAME: Paulo Franco  MRN:   [id number]  :   1975  ACCT. NUMBER: [id number]  DATE OF SERVICE: 10/02/21  START TIME:  9:00 AM  END TIME: 11:00 AM  FACILITATOR(S): Brooklynn Cardona; Jose Angel Sharma LADC; Janusz Montoya LADC  TOPIC: BEH Group Therapy  Number of patients attending the group:  20  Group Length:  2 Hours    Group Therapy Type: Recovery strategies    Summary of Group / Topics Discussed:    Relationship/socialization    Patient was attentive and interactive with group.      Group Attendance:  Attended group session    Patient's response to the group topic/interactions:  cooperative with task    Patient appeared to be Attentive.        Client specific details:  .   Patient attentive and interactive in group setting.

## 2021-10-03 ENCOUNTER — HOSPITAL ENCOUNTER (OUTPATIENT)
Dept: BEHAVIORAL HEALTH | Facility: CLINIC | Age: 46
End: 2021-10-03
Attending: FAMILY MEDICINE
Payer: COMMERCIAL

## 2021-10-03 VITALS — TEMPERATURE: 98 F | OXYGEN SATURATION: 97 %

## 2021-10-03 PROCEDURE — 1002N00001 HC LODGING PLUS FACILITY CHARGE ADULT

## 2021-10-03 PROCEDURE — H2035 A/D TX PROGRAM, PER HOUR: HCPCS | Mod: HQ

## 2021-10-03 NOTE — GROUP NOTE
Group Therapy Documentation    PATIENT'S NAME: Paulo Franco  MRN:   [id number]  :   1975  ACCT. NUMBER: [id number]  DATE OF SERVICE: 10/03/21  START TIME: 12:30 PM  END TIME:  1:30 PM  FACILITATOR(S): Shaan Macias LADC; Bethany Carson LADC  TOPIC: BEH Group Therapy  Number of patients attending the group:  19  Group Length:  1 Hours    Group Therapy Type: Emotion processing    Summary of Group / Topics Discussed:    Relationship/socialization and Emotions/expression      Group Attendance:  Attended group session    Patient's response to the group topic/interactions:  cooperative with task    Patient appeared to be Attentive and Engaged.        Client specific details:  Paulo learned about trust in relationships.

## 2021-10-03 NOTE — GROUP NOTE
Group Therapy Documentation    PATIENT'S NAME: Paulo Franco  MRN:   [id number]  :   1975  ACCT. NUMBER: [id number]  DATE OF SERVICE: 10/03/21  START TIME:  8:45 AM  END TIME: 10:45 AM  FACILITATOR(S): Bethany Carson LADC; Olga Arce RN; Shaan Macias LADC  TOPIC: BEH Group Therapy  Number of patients attending the group:  19  Group Length:  2 Hours    Group Therapy Type: Health and wellbeing     Summary of Group / Topics Discussed:    HIV/AIDS      Group Attendance:  Attended group session    Patient's response to the group topic/interactions:  cooperative with task    Patient appeared to be Attentive and Engaged.        Client specific details:  Paulo learned about HIV/AIDS and prevention.

## 2021-10-03 NOTE — PROGRESS NOTES
Name: Paulo Franco  Date: 10/3/2021  Medical Record: [id number]  Envelope Number: [id number]  List of Contents (List each item separately in new row):   One Cell Phone, One  & Cord.   Admission:  I am responsible for any personal items that are not sent to the safe or pharmacy.  [facility] is not responsible for loss, theft or damage of any property in my possession.    Patient Signature:  ___________________________________________       Date/Time:__________________________    Staff Signature: __________________________________       Date/Time:__________________________    2nd Staff person, if patient is unable/unwilling to sign:      __________________________________________________________       Date/Time: __________________________    Discharge:  [facility] has returned all of my personal belongings:    Patient Signature: ________________________________________     Date/Time: ____________________________________    Staff Signature: ______________________________________     Date/Time:_____________________________________

## 2021-10-04 ENCOUNTER — HOSPITAL ENCOUNTER (OUTPATIENT)
Dept: BEHAVIORAL HEALTH | Facility: CLINIC | Age: 46
End: 2021-10-04
Attending: FAMILY MEDICINE
Payer: COMMERCIAL

## 2021-10-04 VITALS — TEMPERATURE: 97.7 F | OXYGEN SATURATION: 97 %

## 2021-10-04 PROCEDURE — H2035 A/D TX PROGRAM, PER HOUR: HCPCS | Mod: HQ

## 2021-10-04 PROCEDURE — 1002N00001 HC LODGING PLUS FACILITY CHARGE ADULT

## 2021-10-04 NOTE — GROUP NOTE
Group Therapy Documentation    PATIENT'S NAME: Paulo Franco  MRN:   [id number]  :   1975  ACCT. NUMBER: [id number]  DATE OF SERVICE: 10/04/21  START TIME:  9:00 AM  END TIME: 11:00 AM  FACILITATOR(S): Jaiden Millan LADC; Mac Walker LADC; Franklin Roche  TOPIC: BEH Group Therapy  Number of patients attending the group:  7  Group Length:  2 Hours    Group Therapy Type: Recovery strategies    Summary of Group / Topics Discussed:    Recovery Principles      Group Attendance:  Attended group session    Patient's response to the group topic/interactions:  cooperative with task    Patient appeared to be Attentive.        Client specific details:  Antonio attended AM group. Patient checked in, took part in a group discussion, and gave a peer feedback on a presented assignment.

## 2021-10-04 NOTE — GROUP NOTE
Group Therapy Documentation    PATIENT'S NAME: Paulo Franco  MRN:   [id number]  :   1975  ACCT. NUMBER: [id number]  DATE OF SERVICE: 10/04/21  START TIME: 12:30 PM  END TIME:  2:30 PM  FACILITATOR(S): Mac Walker LADC  TOPIC: BEH Group Therapy  Number of patients attending the group:  8  Group Length:  2 Hours    Group Therapy Type: Recovery strategies and Health and wellbeing     Summary of Group / Topics Discussed:    Spiritual Care      Group Attendance:  Attended group session    Patient's response to the group topic/interactions:  cooperative with task    Patient appeared to be Actively participating, Attentive and Engaged.        Client specific details:  Antonio gave appropriate feedback..

## 2021-10-04 NOTE — PROGRESS NOTES
Patient:  Paulo Franco    ATTENDANCE for the following date span:  9/27/21 - 10/3/21  (date, type, and amount of each treatment service completed)       Day Monday Tuesday Wednesday Thursday Friday Saturday Pablo   Group Hours   4 4 5 4 4 4 2   Skills Hours    1  1   1   Individual Session (LADC)            Individual Session  (psychotherapy)            Peer-led Recovery Group   1 1 1 1 1 1 1               Adult CD Progress Note and Treatment Plan Review     Attendance  Please refer to OP BEH CD Adult Attendance Record Documentation Flowsheet    Support group attended this week: yes    Reporting sobriety:  yes        Treatment Plan     Treatment Plan Review competed on: 10/4/21       Client preferred learning style: Visual  Hands on  Verbal    Staff Members contributing AYESHA Thompson, DAVID Her, AYESHA Laughlin Intern                    Received Supervision: No    Client: contributed to goals and plan.    Client received copy of plan/revised plan: Yes    Client agrees with plan/revised plan: Yes        Changes to Treatment Plan: No    New Goals added since last review None needed.    Goals worked on since last review See ASAM notes below.    Strategies effective: yes    Strategies need these changes: None needed.    1) Care Coordination Activities:  None.  2) Medical, Mental Health and other appointments the client attended: None.  3) Medication issues: None.  4) Physical and mental health problems: Pt.reports he has had no issues in this area.  5) Any changes in Vulnerable Adult Status?  No If yes, add to treatment plan and individual abuse prevention plan.  6) Review and evaluation of the individual abuse prevention plan: Current IAPP for this program is adequate for this client          ASAM Risk Rating:   Dimension 1 0 No change.   Dimension 2 0 No change   Dimension 3 2 Antonio.reports he has had no suicidal ideation. He continues to work on his goal of gaining insight into how his addiction has  "affected his mental health. Pt.reports he has less anxiety since entering treatment and being sober. He was given the assignment \"Stress and Recovery\" to read and present to group.   Dimension 4 2 Antonio has been attending all groups and lectures. He presented his first step assignment He continues to work on increasing his internal motivation to change his life style to maintain sobriety. He completed his 1st step assignment and presented it to group.   Dimension 5 4 Antonio lacks insight into his relapse warning signs and triggers. He continues to work on identifying his high risk situations.  He is working on his relapse prevention assignments.   Dimension 6 4 Antonio has been atteneding 12 step meetings while in treatment. He needs to obtain a sponsor. He is working with program case manage on aftercare plan.  He attended the Relationships weekend workshop and learned about healthy/unhealthy relationships, assertive communication, boundaries, and co-dependency.      Guide to Risk Ratings for Suicidality:   IDEATION: Active thoughts of suicide? INTENT: Intent to follow on suicide? PLAN: Plan to follow through on suicide? Level of Risk:   IF Yes Yes Yes Patient = High Emergent   IF Yes Yes No Patient = High Urgent/Non-Emergent   IF Yes No No Patient = Moderate Non-Urgent   IF No No   No Patient = Low Risk   The patient's ADDITIONAL RISK FACTORS and lack of PROTECTIVE FACTORS may increase their overall suicide risk ratings.     Patient's/client's current risk rating:  Low Risk    Family Involvement:   ADILIA signed    Data:   offered feedback good insight client did actively participate      Intervention:   Aftercare planning  Behavior modification  Counselor feedback  Education  Emotional management  Group feedback  Relapse prevention  Twelve Step facilitation  Mental health education      Assessment:   Stages of Change " Model  Preparation/Determination    Appears/Sounds:  Cooperative  Motivated  Engaged      Plan:  Contine group therapy.      Franklin Roche, [facility] Intern

## 2021-10-05 ENCOUNTER — HOSPITAL ENCOUNTER (OUTPATIENT)
Dept: BEHAVIORAL HEALTH | Facility: CLINIC | Age: 46
End: 2021-10-05
Attending: FAMILY MEDICINE
Payer: COMMERCIAL

## 2021-10-05 VITALS — OXYGEN SATURATION: 100 % | TEMPERATURE: 98.2 F

## 2021-10-05 PROCEDURE — H2035 A/D TX PROGRAM, PER HOUR: HCPCS | Mod: HQ

## 2021-10-05 PROCEDURE — 1002N00001 HC LODGING PLUS FACILITY CHARGE ADULT

## 2021-10-05 NOTE — GROUP NOTE
Group Therapy Documentation    PATIENT'S NAME: Paulo Franco  MRN:   [id number]  :   1975  ACCT. NUMBER: [id number]  DATE OF SERVICE: 10/05/21  START TIME: 12:30 PM  END TIME:  2:30 PM  FACILITATOR(S): Mac Walker LADC; Jaiden Millan LADC  TOPIC: BEH Group Therapy  Number of patients attending the group:  7  Group Length:  2 Hours    Group Therapy Type: Recovery strategies    Summary of Group / Topics Discussed:    Recovery Principles, Balanced lifestyle, Disease of addiction, and Relapse prevention      Group Attendance:  Attended group session    Patient's response to the group topic/interactions:  cooperative with task    Patient appeared to be Actively participating, Attentive and Engaged.        Client specific details:  Antonio presented his 25 sober coping skills assignment..

## 2021-10-05 NOTE — GROUP NOTE
Group Therapy Documentation    PATIENT'S NAME: Paulo Franco  MRN:   [id number]  :   1975  ACCT. NUMBER: [id number]  DATE OF SERVICE: 10/05/21  START TIME:  3:00 PM  END TIME:  4:00 PM  FACILITATOR(S): Jona Clay LADC  TOPIC: BEH Group Therapy  Number of patients attending the group:  7  Group Length:  1 Hours    Group Therapy Type: Recovery strategies    Summary of Group / Topics Discussed:    Recovery Principles      Group Attendance:  Attended group session    Patient's response to the group topic/interactions:  cooperative with task    Patient appeared to be Actively participating.        Client specific details:  Paulo participated and interacted appropriately with peers and staff in skills group. No triggers to use noted or discussed.

## 2021-10-05 NOTE — GROUP NOTE
Group Therapy Documentation    PATIENT'S NAME: Paulo Franco  MRN:   [id number]  :   1975  ACCT. NUMBER: [id number]  DATE OF SERVICE: 10/05/21  START TIME:  9:00 AM  END TIME: 11:00 AM  FACILITATOR(S): Jaiden Millan LADC; Mac Walker LADC; Franklin Roche  TOPIC: BEH Group Therapy  Number of patients attending the group:  7  Group Length:  2 Hours    Group Therapy Type: Recovery strategies    Summary of Group / Topics Discussed:    Recovery Principles      Group Attendance:  Attended group session    Patient's response to the group topic/interactions:  cooperative with task    Patient appeared to be Attentive.        Client specific details:  Antonio attended AM group. Patient checked in, gave a peer feedback, and helped graduate a peer..

## 2021-10-06 ENCOUNTER — HOSPITAL ENCOUNTER (OUTPATIENT)
Dept: BEHAVIORAL HEALTH | Facility: CLINIC | Age: 46
End: 2021-10-06
Attending: FAMILY MEDICINE
Payer: COMMERCIAL

## 2021-10-06 PROCEDURE — 1002N00001 HC LODGING PLUS FACILITY CHARGE ADULT

## 2021-10-06 PROCEDURE — H2035 A/D TX PROGRAM, PER HOUR: HCPCS | Mod: HQ

## 2021-10-06 NOTE — GROUP NOTE
Group Therapy Documentation    PATIENT'S NAME: Paulo Franco  MRN:   [id number]  :   1975  ACCT. NUMBER: [id number]  DATE OF SERVICE: 10/06/21  START TIME:  8:30 AM  END TIME:  9:30 AM  FACILITATOR(S): Jaiden Millan LADC; Mac Walker LADC; Franklin Roche  TOPIC: BEH Group Therapy  Number of patients attending the group:  23  Group Length:  1 Hours    Group Therapy Type: Recovery strategies    Summary of Group / Topics Discussed:    Recovery Principles, Sober coping skills, and Coping/DBT informed care      Group Attendance:  Attended group session    Patient's response to the group topic/interactions:  cooperative with task    Patient appeared to be Attentive.        Client specific details:  Antonio attended Wed AM lecture. Patient learned about the components/background of DBT and its uses. Patient was attentive and engaged and took part in a group discussio.

## 2021-10-06 NOTE — GROUP NOTE
Group Therapy Documentation    PATIENT'S NAME: Paulo Franco  MRN:   [id number]  :   1975  ACCT. NUMBER: [id number]  DATE OF SERVICE: 10/06/21  START TIME: 12:30 PM  END TIME:  2:30 PM  FACILITATOR(S): Mac Walker LADC; Mariam Ferro  TOPIC: BEH Group Therapy  Number of patients attending the group:  9  Group Length:  2 Hours    Group Therapy Type: Recovery strategies    Summary of Group / Topics Discussed:    Recovery Principles, Sober coping skills, Balanced lifestyle, Disease of addiction, and Relapse prevention      Group Attendance:  Attended group session    Patient's response to the group topic/interactions:  cooperative with task    Patient appeared to be Actively participating, Attentive and Engaged.        Client specific details:  Antonio gave appropriate feedback..

## 2021-10-06 NOTE — GROUP NOTE
Group Therapy Documentation    PATIENT'S NAME: Paulo Franco  MRN:   [id number]  :   1975  ACCT. NUMBER: [id number]  DATE OF SERVICE: 10/06/21  START TIME:  9:45 AM  END TIME: 11:20 AM  FACILITATOR(S): Jaiden Millan LADC  TOPIC: BEH Group Therapy  Number of patients attending the group:  9  Group Length:  2 Hours    Group Therapy Type: Recovery strategies    Summary of Group / Topics Discussed:    Recovery Principles      Group Attendance:  Attended group session    Patient's response to the group topic/interactions:  cooperative with task    Patient appeared to be Attentive.        Client specific details:  Antonio attended AM group. Patient checked in, took part in a discussion, and gave a peer feedback on a presented assignment.

## 2021-10-07 ENCOUNTER — HOSPITAL ENCOUNTER (OUTPATIENT)
Dept: BEHAVIORAL HEALTH | Facility: CLINIC | Age: 46
End: 2021-10-07
Attending: FAMILY MEDICINE
Payer: COMMERCIAL

## 2021-10-07 VITALS — OXYGEN SATURATION: 98 % | TEMPERATURE: 97.9 F

## 2021-10-07 PROCEDURE — H2035 A/D TX PROGRAM, PER HOUR: HCPCS | Mod: HQ

## 2021-10-07 PROCEDURE — 1002N00001 HC LODGING PLUS FACILITY CHARGE ADULT

## 2021-10-07 NOTE — GROUP NOTE
Psychoeducation Group Documentation    PATIENT'S NAME: Paulo Franco  MRN:   [id number]  :   1975  ACCT. NUMBER: [id number]  DATE OF SERVICE: 10/07/21  START TIME:  3:00 PM  END TIME:  4:00 PM  FACILITATOR(S): Khushboo Tinajero LADC; Deuce Robbins LADC; Mariam Ferro  TOPIC: BEH Pyschoeducation  Number of patients attending the group:  25  Group Length:  1 Hours    Skills Group Therapy Type: Recovery skills    Summary of Group / Topics Discussed:    Lecture presentation on problem gambling and resources          Group Attendance:  Attended group session    Patient's response to the group topic/interactions:  cooperative with task and discussed personal experience with topic    Patient appeared to be Actively participating, Attentive and Engaged.         Client specific details:  .

## 2021-10-07 NOTE — GROUP NOTE
Group Therapy Documentation    PATIENT'S NAME: Paulo Franco  MRN:   [id number]  :   1975  ACCT. NUMBER: [id number]  DATE OF SERVICE: 10/07/21  START TIME:  9:00 AM  END TIME: 11:00 AM  FACILITATOR(S): Mariam Ferro  TOPIC: BEH Group Therapy  Number of patients attending the group:  10    Group Length:  2 Hours    Group Therapy Type: Recovery strategies, Emotion processing, and Daily living/independence skills    Summary of Group / Topics Discussed:    Recovery Principles, Spiritual Care, Sober coping skills, and Relapse prevention      Group Attendance:  Attended group session    Patient's response to the group topic/interactions:  cooperative with task    Patient appeared to be Actively participating, Attentive and Engaged.        Client specific details:  Patient was attentive and participative during group session.

## 2021-10-07 NOTE — GROUP NOTE
"Group Therapy Documentation    PATIENT'S NAME: Paulo Franco  MRN:   [id number]  :   1975  ACCT. NUMBER: [id number]  DATE OF SERVICE: 10/07/21  START TIME: 12:30 PM  END TIME:  2:30 PM  FACILITATOR(S): Mac Walker LADC; Mariam Ferro  TOPIC: BEH Group Therapy  Number of patients attending the group:  9  Group Length:  2 Hours    Group Therapy Type: Recovery strategies    Summary of Group / Topics Discussed:    Recovery Principles, Sober coping skills, Balanced lifestyle, Disease of addiction, and Relapse prevention      Group Attendance:  Attended group session    Patient's response to the group topic/interactions:  cooperative with task    Patient appeared to be Actively participating, Attentive and Engaged.        Client specific details:  Antonio presented his \"Anxiety and Recovery\" assignment..      "

## 2021-10-07 NOTE — GROUP NOTE
Group Therapy Documentation    PATIENT'S NAME: Paulo Franco  MRN:   [id number]  :   1975  ACCT. NUMBER: [id number]  DATE OF SERVICE: 10/07/21  START TIME:  9:00 AM  END TIME: 11:00 AM  FACILITATOR(S): Mariam Ferro  TOPIC: BEH Group Therapy  Number of patients attending the group:  10    Group Length:  2 Hours    Group Therapy Type: Recovery strategies, Emotion processing, and Daily living/independence skills    Summary of Group / Topics Discussed:    Recovery Principles, Spiritual Care, Sober coping skills, Relationship/socialization, and Relapse prevention      Group Attendance:      Patient's response to the group topic/interactions:      Patient appeared to be .        Client specific details:  ***.

## 2021-10-08 ENCOUNTER — HOSPITAL ENCOUNTER (OUTPATIENT)
Dept: BEHAVIORAL HEALTH | Facility: CLINIC | Age: 46
End: 2021-10-08
Attending: FAMILY MEDICINE
Payer: COMMERCIAL

## 2021-10-08 VITALS — OXYGEN SATURATION: 99 % | TEMPERATURE: 98 F

## 2021-10-08 PROCEDURE — H2035 A/D TX PROGRAM, PER HOUR: HCPCS | Mod: HQ

## 2021-10-08 PROCEDURE — 1002N00001 HC LODGING PLUS FACILITY CHARGE ADULT

## 2021-10-08 NOTE — GROUP NOTE
Group Therapy Documentation    PATIENT'S NAME: Paulo Franco  MRN:   [id number]  :   1975  ACCT. NUMBER: [id number]  DATE OF SERVICE: 10/08/21  START TIME:  9:00 AM  END TIME: 11:00 AM  FACILITATOR(S): Mac Walker LADC  TOPIC: BEH Group Therapy  Number of patients attending the group:  11  Group Length:  2 Hours    Group Therapy Type: Recovery strategies    Summary of Group / Topics Discussed:    Recovery Principles, Sober coping skills, Balanced lifestyle, Disease of addiction, Emotions/expression, and Relapse prevention      Group Attendance:  Attended group session    Patient's response to the group topic/interactions:  cooperative with task    Patient appeared to be Actively participating, Attentive and Engaged.        Client specific details:  Antonio gave appropriate feedback..

## 2021-10-08 NOTE — GROUP NOTE
Group Therapy Documentation    PATIENT'S NAME: Paulo Franco  MRN:   [id number]  :   1975  ACCT. NUMBER: [id number]  DATE OF SERVICE: 10/08/21  START TIME: 12;30PM  END TIME: 2:30PM  FACILITATOR(S): Mac Walker LADC; Mariam Ferro  TOPIC: BEH Group Therapy  Number of patients attending the group:    Group Length:  2 Hours    Group Therapy Type: Recovery strategies    Summary of Group / Topics Discussed:    Recovery Principles, Sober coping skills, and Balanced lifestyle      Group Attendance:  Attended group session    Patient's response to the group topic/interactions:  cooperative with task    Patient appeared to be Actively participating and Attentive.        Client specific details:  Antonio gave appropriate feedback..

## 2021-10-09 ENCOUNTER — HOSPITAL ENCOUNTER (OUTPATIENT)
Dept: BEHAVIORAL HEALTH | Facility: CLINIC | Age: 46
End: 2021-10-09
Attending: FAMILY MEDICINE
Payer: COMMERCIAL

## 2021-10-09 VITALS — TEMPERATURE: 98 F | OXYGEN SATURATION: 98 %

## 2021-10-09 PROCEDURE — H2035 A/D TX PROGRAM, PER HOUR: HCPCS | Mod: HQ

## 2021-10-09 PROCEDURE — 1002N00001 HC LODGING PLUS FACILITY CHARGE ADULT

## 2021-10-09 NOTE — GROUP NOTE
Group Therapy Documentation    PATIENT'S NAME: Paulo Franco  MRN:   [id number]  :   1975  ACCT. NUMBER: [id number]  DATE OF SERVICE: 10/09/21  START TIME:  9:00 AM  END TIME: 11:00 AM  FACILITATOR(S): Jaiden Millan LADC; Bonnie Navarro LADC; Cony Guerrero [facility] [place]  TOPIC: BEH Group Therapy  Number of patients attending the group:  24  Group Length:  2 Hours    Group Therapy Type: Recovery strategies    Summary of Group / Topics Discussed:    Recovery Principles      Group Attendance:  Attended group session    Patient's response to the group topic/interactions:  cooperative with task    Patient appeared to be Attentive.        Client specific details:  Antonio attended AM group. Patient took part in an exercise/discussion on honesty in recovery .

## 2021-10-09 NOTE — GROUP NOTE
Group Therapy Documentation    PATIENT'S NAME: Paulo Franco  MRN:   [id number]  :   1975  ACCT. NUMBER: [id number]  DATE OF SERVICE: 10/09/21  START TIME: 12:30 PM  END TIME:  2:30 PM  FACILITATOR(S): Bonnie Navarro LADC; Jaiden Millan LADC  TOPIC: BEH Group Therapy  Number of patients attending the group: 24  Group Length:  2 Hours    Group Therapy Type: Recovery strategies    Summary of Group / Topics Discussed:    Relationship/socialization      Group Attendance:  Attended group session    Patient's response to the group topic/interactions:  cooperative with task    Patient appeared to be Attentive and Engaged.        Client specific details: Paulo was an active participant in afternoon session of weekend Relationships workshop.

## 2021-10-10 ENCOUNTER — HOSPITAL ENCOUNTER (OUTPATIENT)
Dept: BEHAVIORAL HEALTH | Facility: CLINIC | Age: 46
End: 2021-10-10
Attending: FAMILY MEDICINE
Payer: COMMERCIAL

## 2021-10-10 VITALS — OXYGEN SATURATION: 99 % | TEMPERATURE: 98 F

## 2021-10-10 PROCEDURE — H2035 A/D TX PROGRAM, PER HOUR: HCPCS | Mod: HQ

## 2021-10-10 PROCEDURE — 1002N00001 HC LODGING PLUS FACILITY CHARGE ADULT

## 2021-10-10 NOTE — GROUP NOTE
Psychoeducation Group Documentation    PATIENT'S NAME: Paulo Franco  MRN:   [id number]  :   1975  ACCT. NUMBER: [id number]  DATE OF SERVICE: 10/10/21  START TIME:  9:00 AM  END TIME: 11:00 AM  FACILITATOR(S): Cony Guerrero LADC; Jaiden Millan LADC; Regina Villar RN  TOPIC: BEH Pyschoeducation  Number of patients attending the group:  23  Group Length:  2 Hours    Skills Group Therapy Type: Healthy behaviors development    Summary of Group / Topics Discussed:    Balanced lifestyle skills          Group Attendance:  Attended group session    Patient's response to the group topic/interactions:  cooperative with task    Patient appeared to be Attentive.         Client specific details: Pt was attentive and appropriate during RN's Lecture on TB/Hep A, B, &C this AM.

## 2021-10-10 NOTE — PROGRESS NOTES
Patient:  Paulo Franco    ATTENDANCE for the following date span:  10/4/21 - 10/3/21  (date, type, and amount of each treatment service completed)       Day Monday Tuesday Wednesday Thursday Friday Saturday Pablo   Group Hours   4 4 5 4 4 4 2   Skills Hours    1  1   1   Individual Session (LADC)            Individual Session  (psychotherapy)            Peer-led Recovery Group   1 1 1 1 1 1 1               Adult CD Progress Note and Treatment Plan Review     Attendance  Please refer to OP BEH CD Adult Attendance Record Documentation Flowsheet    Support group attended this week: yes    Reporting sobriety:  yes        Treatment Plan     Treatment Plan Review competed on: 10/10/21       Client preferred learning style: Visual  Hands on  Verbal    Staff Members contributing AYESHA Thompson, DAVID Her, AYESHA Laughlin Intern                    Received Supervision: No    Client: contributed to goals and plan.    Client received copy of plan/revised plan: Yes    Client agrees with plan/revised plan: Yes        Changes to Treatment Plan: No    New Goals added since last review None needed.    Goals worked on since last review See ASAM notes below.    Strategies effective: yes    Strategies need these changes: None needed.    1) Care Coordination Activities:  None.  2) Medical, Mental Health and other appointments the client attended: None.  3) Medication issues: None.  4) Physical and mental health problems: Pt.reports he has had no issues in this area.  5) Any changes in Vulnerable Adult Status?  No If yes, add to treatment plan and individual abuse prevention plan.  6) Review and evaluation of the individual abuse prevention plan: Current IAPP for this program is adequate for this client          ASAM Risk Rating:   Dimension 1 0 No change.   Dimension 2 0 No change   Dimension 3 1 Antonio reports he has had no suicidal ideation. He continues to work on his goal of gaining insight into how his addiction has  "affected his mental health.  Pt reports he has less anxiety in recovery. He completed the assignment \"Anxiety and Recovery from Chemical Dependency\"  to read and present to group. Patient attended Spiritual Care group this past week. Patient is working on \"Grief and Loss\" packet.   Dimension 4 1 Antonio has been attending all groups and lectures.  He continues to work on increasing his internal motivation to change his life style to maintain sobriety. He completed his 1st step assignment and presented it to group. Patient is working on his \"Drug Use History\".     Dimension 5 4 Antonio lis gaining some insight into his relapse warning signs and triggers. He continues to work on identifying his high risk situations.  He is working on his relapse prevention assignments.   Dimension 6 3 Antonio has been attending 12 step meetings while in treatment. He needs to obtain a sponsor. He is working with program case manage on aftercare plan but has not agreed to attending an aftercare program at this time..  He attended the Relationships weekend workshop and learned about healthy/unhealthy relationships, assertive communication, boundaries, and co-dependency.      Guide to Risk Ratings for Suicidality:   IDEATION: Active thoughts of suicide? INTENT: Intent to follow on suicide? PLAN: Plan to follow through on suicide? Level of Risk:   IF Yes Yes Yes Patient = High Emergent   IF Yes Yes No Patient = High Urgent/Non-Emergent   IF Yes No No Patient = Moderate Non-Urgent   IF No No   No Patient = Low Risk   The patient's ADDITIONAL RISK FACTORS and lack of PROTECTIVE FACTORS may increase their overall suicide risk ratings.     Patient's/client's current risk rating:  Low Risk    Family Involvement:   ADILIA signed    Data:   offered feedback good insight client did actively participate      Intervention:   Aftercare planning  Behavior modification  Counselor feedback  Education  Emotional management  Group feedback  Relapse " prevention  Twelve Step facilitation  Mental health education      Assessment:   Stages of Change Model  Preparation/Determination    Appears/Sounds:  Cooperative  Motivated  Engaged      Plan:  Contine group therapy.      DAVID Gallo

## 2021-10-10 NOTE — GROUP NOTE
Group Therapy Documentation    PATIENT'S NAME: Paulo Franco  MRN:   [id number]  :   1975  ACCT. NUMBER: [id number]  DATE OF SERVICE: 10/10/21  START TIME: 12:30 PM  END TIME:  1:30 PM  FACILITATOR(S): Jaiden Millan [facility]; [facility]  TOPIC: BEH Group Therapy  Number of patients attending the group:  24  Group Length:  1 Hours    Group Therapy Type: Recovery strategies    Summary of Group / Topics Discussed:    Recovery Principles      Group Attendance:  Attended group session    Patient's response to the group topic/interactions:  cooperative with task    Patient appeared to be Attentive.        Client specific details:  Antonio attended afternoon Skills group. Patient took part in a group exercise on the importance of a sober support network, and finding balance in recovery.

## 2021-10-11 ENCOUNTER — HOSPITAL ENCOUNTER (OUTPATIENT)
Dept: BEHAVIORAL HEALTH | Facility: CLINIC | Age: 46
End: 2021-10-11
Attending: FAMILY MEDICINE
Payer: COMMERCIAL

## 2021-10-11 VITALS — TEMPERATURE: 97.8 F | OXYGEN SATURATION: 100 %

## 2021-10-11 PROCEDURE — 1002N00001 HC LODGING PLUS FACILITY CHARGE ADULT

## 2021-10-11 PROCEDURE — H2035 A/D TX PROGRAM, PER HOUR: HCPCS | Mod: HQ

## 2021-10-11 NOTE — GROUP NOTE
Group Therapy Documentation    PATIENT'S NAME: Paulo Franco  MRN:   [id number]  :   1975  ACCT. NUMBER: [id number]  DATE OF SERVICE: 10/11/21  START TIME:  9:00 AM  END TIME: 11:00 AM  FACILITATOR(S): Jaiden Millan LADC; Franklin Roche  TOPIC: BEH Group Therapy  Number of patients attending the group:  10  Group Length:  2 Hours    Group Therapy Type: Recovery strategies    Summary of Group / Topics Discussed:    Recovery Principles      Group Attendance:  Attended group session    Patient's response to the group topic/interactions:  cooperative with task    Patient appeared to be Attentive.        Client specific details:  Antonio attended AM group. Patient took part in a discussion on conflict and resolution. Patient checked in, and gave a peer feedback on his presentation. Patient also graduated a peer.

## 2021-10-11 NOTE — GROUP NOTE
Group Therapy Documentation    PATIENT'S NAME: Paulo Franco  MRN:   [id number]  :   1975  ACCT. NUMBER: [id number]  DATE OF SERVICE: 10/11/21  START TIME: 12:30 PM  END TIME:  2:30 PM  FACILITATOR(S): Franklin Roche; Azalia Leong  TOPIC: BEH Group Therapy  Number of patients attending the group:  10  Group Length:  2 Hours    Group Therapy Type: Recovery strategies, Emotion processing, Daily living/independence skills, Health and wellbeing , and Medication education    Summary of Group / Topics Discussed:    Spiritual Care      Group Attendance:  Attended group session    Patient's response to the group topic/interactions:  cooperative with task    Patient appeared to be Attentive and Engaged.        Client specific details:  Paulo participated in spirituality group.  He engaged in the discussions, an outside activity, and the readings.

## 2021-10-12 ENCOUNTER — HOSPITAL ENCOUNTER (OUTPATIENT)
Dept: BEHAVIORAL HEALTH | Facility: CLINIC | Age: 46
End: 2021-10-12
Attending: FAMILY MEDICINE
Payer: COMMERCIAL

## 2021-10-12 VITALS — OXYGEN SATURATION: 98 % | TEMPERATURE: 98.1 F

## 2021-10-12 PROCEDURE — H2035 A/D TX PROGRAM, PER HOUR: HCPCS | Mod: HQ

## 2021-10-12 PROCEDURE — 1002N00001 HC LODGING PLUS FACILITY CHARGE ADULT

## 2021-10-12 NOTE — GROUP NOTE
Group Therapy Documentation    PATIENT'S NAME: Paulo Franco  MRN:   [id number]  :   1975  ACCT. NUMBER: [id number]  DATE OF SERVICE: 10/12/21  START TIME: 12:30 PM  END TIME:  2:30 PM  FACILITATOR(S): Mac Walker LADC; Jaiden Millan LADC  TOPIC: BEH Group Therapy  Number of patients attending the group:  9  Group Length:  2 Hours    Group Therapy Type: Recovery strategies    Summary of Group / Topics Discussed:    Recovery Principles, Sober coping skills, Balanced lifestyle, Disease of addiction, and Relapse prevention      Group Attendance:  Attended group session    Patient's response to the group topic/interactions:  cooperative with task    Patient appeared to be Actively participating, Attentive and Engaged.        Client specific details:  Antonio gave appropriate feedback..

## 2021-10-12 NOTE — GROUP NOTE
Group Therapy Documentation    PATIENT'S NAME: Paulo Franco  MRN:   [id number]  :   1975  ACCT. NUMBER: [id number]  DATE OF SERVICE: 10/12/21  START TIME:  3:00 PM  END TIME:  4:00 PM  FACILITATOR(S): Mariam Ferro; Agnieszka Birch LAD; [facility]  TOPIC: BEH Group Therapy  Number of patients attending the group:  22  Group Length:  1 Hours    Group Therapy Type: Recovery strategies    Summary of Group / Topics Discussed:    Recovery Principles, Relationship/socialization, and Disease of addiction      Group Attendance:  Attended group session    Patient's response to the group topic/interactions:  cooperative with task    Patient appeared to be Actively participating, Attentive and Engaged.        Client specific details:  .

## 2021-10-12 NOTE — GROUP NOTE
Group Therapy Documentation    PATIENT'S NAME: Paulo Franco  MRN:   [id number]  :   1975  ACCT. NUMBER: [id number]  DATE OF SERVICE: 10/12/21  START TIME:  9:00 AM  END TIME: 11:00 AM  FACILITATOR(S): Jaiden Millan LADC; Mac Walker LADC  TOPIC: BEH Group Therapy  Number of patients attending the group:  9  Group Length:  2 Hours    Group Therapy Type: Recovery strategies    Summary of Group / Topics Discussed:    Recovery Principles      Group Attendance:  Attended group session    Patient's response to the group topic/interactions:  cooperative with task    Patient appeared to be Attentive.        Client specific details:  Antonio attended AM group.Patient checked in, and took part in a discussion on relapse and recovery. Patient was attentive and engaged.

## 2021-10-13 ENCOUNTER — HOSPITAL ENCOUNTER (OUTPATIENT)
Dept: BEHAVIORAL HEALTH | Facility: CLINIC | Age: 46
End: 2021-10-13
Attending: FAMILY MEDICINE
Payer: COMMERCIAL

## 2021-10-13 VITALS — TEMPERATURE: 98 F | OXYGEN SATURATION: 97 %

## 2021-10-13 PROCEDURE — H2035 A/D TX PROGRAM, PER HOUR: HCPCS | Mod: HQ

## 2021-10-13 PROCEDURE — 1002N00001 HC LODGING PLUS FACILITY CHARGE ADULT

## 2021-10-13 NOTE — GROUP NOTE
Group Therapy Documentation    PATIENT'S NAME: Paulo Franco  MRN:   [id number]  :   1975  ACCT. NUMBER: [id number]  DATE OF SERVICE: 10/13/21  START TIME:  8:30 AM  END TIME:  9:30 AM  FACILITATOR(S): Mac Walker LADC  TOPIC: BEH Group Therapy  Number of patients attending the group:  9  Group Length:  1 Hours    Group Therapy Type: Recovery strategies    Summary of Group / Topics Discussed:    Balanced lifestyle      Group Attendance:  Attended group session    Patient's response to the group topic/interactions:  cooperative with task    Patient appeared to be Actively participating, Attentive and Engaged.        Client specific details:  Antonio gave appropriate feedback..

## 2021-10-13 NOTE — GROUP NOTE
Group Therapy Documentation    PATIENT'S NAME: Paulo Franco  MRN:   [id number]  :   1975  ACCT. NUMBER: [id number]  DATE OF SERVICE: 10/13/21  START TIME: 12:30 PM  END TIME:  2:30 PM  FACILITATOR(S): Mac Walker LADC; Jaiden Millan LADC  TOPIC: BEH Group Therapy  Number of patients attending the group:  9  Group Length:  2 Hours    Group Therapy Type: Recovery strategies and Emotion processing    Summary of Group / Topics Discussed:    Recovery Principles, Sober coping skills, Balanced lifestyle, Disease of addiction, Emotions/expression, and Relapse prevention      Group Attendance:  Attended group session    Patient's response to the group topic/interactions:  cooperative with task    Patient appeared to be Actively participating, Attentive and Engaged.        Client specific details:  Antonio gave appropriate feedback..

## 2021-10-13 NOTE — GROUP NOTE
Group Therapy Documentation    PATIENT'S NAME: Paulo Franco  MRN:   [id number]  :   1975  ACCT. NUMBER: [id number]  DATE OF SERVICE: 10/13/21  START TIME:  9:45 AM  END TIME: 11:20 AM  FACILITATOR(S): Jaiden Millan LADC; Mac Walker LADC  TOPIC: BEH Group Therapy  Number of patients attending the group:  8  Group Length:  1.5 Hours    Group Therapy Type: Recovery strategies    Summary of Group / Topics Discussed:    Recovery Principles      Group Attendance:  Attended group session    Patient's response to the group topic/interactions:  cooperative with task    Patient appeared to be Attentive.        Client specific details:  Antonio attended AM group. Patient checked in, and took part in a group discussion on fear in recovery.

## 2021-10-14 ENCOUNTER — HOSPITAL ENCOUNTER (OUTPATIENT)
Dept: BEHAVIORAL HEALTH | Facility: CLINIC | Age: 46
End: 2021-10-14
Attending: FAMILY MEDICINE
Payer: COMMERCIAL

## 2021-10-14 VITALS — TEMPERATURE: 98 F | OXYGEN SATURATION: 97 %

## 2021-10-14 PROCEDURE — 1002N00001 HC LODGING PLUS FACILITY CHARGE ADULT

## 2021-10-14 PROCEDURE — H2035 A/D TX PROGRAM, PER HOUR: HCPCS | Mod: HQ

## 2021-10-14 NOTE — GROUP NOTE
Group Therapy Documentation    PATIENT'S NAME: Paulo Franco  MRN:   [id number]  :   1975  ACCT. NUMBER: [id number]  DATE OF SERVICE: 10/14/21  START TIME: 12:30 PM  END TIME:  2:30 PM  FACILITATOR(S): Mac Walker LADC; Jaiden Millan LADC  TOPIC: BEH Group Therapy  Number of patients attending the group:  10  Group Length:  2 Hours    Group Therapy Type: Recovery strategies and Emotion processing    Summary of Group / Topics Discussed:    Recovery Principles, Sober coping skills, Balanced lifestyle, Disease of addiction, Emotions/expression, and Relapse prevention      Group Attendance:  Attended group session    Patient's response to the group topic/interactions:  cooperative with task    Patient appeared to be Actively participating, Attentive and Engaged.        Client specific details:  Antonio presented his drug use history assignment..

## 2021-10-14 NOTE — GROUP NOTE
Group Therapy Documentation    PATIENT'S NAME: Paulo Franco  MRN:   [id number]  :   1975  ACCT. NUMBER: [id number]  DATE OF SERVICE: 10/14/21  START TIME:  3:00 PM  END TIME:  4:00 PM  FACILITATOR(S): Bonnie Navarro LADC; Agnieszka Birch LADC  TOPIC: BEH Group Therapy  Number of patients attending the group: 19  Group Length:  1 Hours    Group Therapy Type: Recovery strategies    Summary of Group / Topics Discussed:    Disease of addiction      Group Attendance:  Attended group session    Patient's response to the group topic/interactions:  cooperative with task    Patient appeared to be Attentive and Engaged.        Client specific details: Paulo was an active participant in Skills Group.

## 2021-10-14 NOTE — GROUP NOTE
Group Therapy Documentation    PATIENT'S NAME: Paulo Franco  MRN:   [id number]  :   1975  ACCT. NUMBER: [id number]  DATE OF SERVICE: 10/14/21  START TIME:  9:00 AM  END TIME: 11:00 AM  FACILITATOR(S): Jaiden Millan LADC; Mac Walker LADC  TOPIC: BEH Group Therapy  Number of patients attending the group:  10  Group Length:  2 Hours    Group Therapy Type: Recovery strategies    Summary of Group / Topics Discussed:    Recovery Principles      Group Attendance:  Attended group session    Patient's response to the group topic/interactions:  cooperative with task    Patient appeared to be Attentive.        Client specific details:  Antonio attended AM group. Patient checked in,reflected on today's meditation, welcomed a new group peer, and gave and received feedback.

## 2021-10-15 ENCOUNTER — OFFICE VISIT (OUTPATIENT)
Dept: ADDICTION MEDICINE | Facility: CLINIC | Age: 46
End: 2021-10-15
Payer: COMMERCIAL

## 2021-10-15 ENCOUNTER — HOSPITAL ENCOUNTER (OUTPATIENT)
Dept: BEHAVIORAL HEALTH | Facility: CLINIC | Age: 46
End: 2021-10-15
Attending: FAMILY MEDICINE
Payer: COMMERCIAL

## 2021-10-15 VITALS
TEMPERATURE: 97.7 F | WEIGHT: 167.7 LBS | BODY MASS INDEX: 22.74 KG/M2 | DIASTOLIC BLOOD PRESSURE: 74 MMHG | OXYGEN SATURATION: 97 % | SYSTOLIC BLOOD PRESSURE: 128 MMHG | HEART RATE: 72 BPM

## 2021-10-15 VITALS — OXYGEN SATURATION: 100 % | TEMPERATURE: 97.5 F

## 2021-10-15 DIAGNOSIS — F10.220 ACUTE ALCOHOLIC INTOXICATION IN ALCOHOLISM WITHOUT COMPLICATION (H): ICD-10-CM

## 2021-10-15 DIAGNOSIS — F10.20 ALCOHOL USE DISORDER, SEVERE, DEPENDENCE (H): Primary | ICD-10-CM

## 2021-10-15 DIAGNOSIS — F90.9 ATTENTION DEFICIT HYPERACTIVITY DISORDER (ADHD), UNSPECIFIED ADHD TYPE: ICD-10-CM

## 2021-10-15 PROBLEM — F10.939 ALCOHOL WITHDRAWAL (H): Status: RESOLVED | Noted: 2021-05-29 | Resolved: 2021-10-15

## 2021-10-15 PROBLEM — F10.21 ALCOHOL DEPENDENCE IN REMISSION (H): Status: RESOLVED | Noted: 2018-08-02 | Resolved: 2021-10-15

## 2021-10-15 PROBLEM — F10.10 ALCOHOL ABUSE: Status: RESOLVED | Noted: 2021-09-16 | Resolved: 2021-10-15

## 2021-10-15 PROBLEM — F19.20 CHEMICAL DEPENDENCY (H): Status: RESOLVED | Noted: 2021-09-22 | Resolved: 2021-10-15

## 2021-10-15 PROCEDURE — 1002N00001 HC LODGING PLUS FACILITY CHARGE ADULT

## 2021-10-15 PROCEDURE — 99204 OFFICE O/P NEW MOD 45 MIN: CPT | Performed by: FAMILY MEDICINE

## 2021-10-15 PROCEDURE — H2035 A/D TX PROGRAM, PER HOUR: HCPCS | Mod: HQ

## 2021-10-15 RX ORDER — ACAMPROSATE CALCIUM 333 MG/1
666 TABLET, DELAYED RELEASE ORAL 3 TIMES DAILY
Qty: 90 TABLET | Refills: 1 | Status: SHIPPED | OUTPATIENT
Start: 2021-10-15 | End: 2021-11-09

## 2021-10-15 RX ORDER — ATOMOXETINE 10 MG/1
10 CAPSULE ORAL DAILY
Qty: 30 CAPSULE | Refills: 1 | Status: SHIPPED | OUTPATIENT
Start: 2021-10-15 | End: 2021-11-09

## 2021-10-15 RX ORDER — GABAPENTIN 300 MG/1
300 CAPSULE ORAL 3 TIMES DAILY PRN
Qty: 45 CAPSULE | Refills: 1 | Status: ON HOLD | OUTPATIENT
Start: 2021-10-15 | End: 2021-12-16

## 2021-10-15 NOTE — PROGRESS NOTES
"    SUBJECTIVE                                                      Paulo Franco is a 45 year old male who presents for  initial visit for addiction consultation and management referred by [facility] due to concerns for Alcohol Use Disorder (AUD)    Visit performed In Person, face-to-face    HPI:   Paulo Franco is a 45 year old male with history of alcohol use who presents for further evaluation of possible substance use disorder and management options.    Initiallyt ried alcohol when a teenager (partying), maybe drank a dozen times through high school. Mother was alcoholic.     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 Was laid off from restaurant at 27, was collecting unemployment then really noticed increase in EtOH use to a six pack of beer or more (up to 12 beers).  Has been using EtOH consistently with exception of 2 1/2 year abstinence in early 30s. Recent abstinence of 8 months prior to recent admission.    His last relapse occurred when he was a  at [employer]. He got sick after exposure at work, tested negative for COVID though felt awful. He called in to work on Thursday, but had to go to work on Friday night, second week at new job, felt pressure, took a shot impulsively, drinking escalated and missed work the next day. Admitted here on 9/16 3A then has been at [facility] and [facility] on Tuesday.      He gives a history of use that has relapses that are impulsive in nature after acute life stressors, such as his ex-girlfriend dying or being laid-off. Per his history, he does  Usually does not have issues with cravings, has more impulse control. Uses meditation and exercise (biking) to control anxiety/cravings, but when has breakthrough impulsivity is \"all of " "nothing.\" Feels that alcohol \"slows\" his mind down and allows him to \"control\" when his \"mind spirals.\" Has diagnoses of ADHD, had issues in high school and college with focusing on things outside of sports or \"hands-on\" classes such as \"shop class.\" Previously on atomoxetine when in college/early 20s, unsure of dose. Is interested in having ADHD being address to help with focus and impulsivity which drives stress.     Has therapy appt pending, Mae Egan at [facility].     Hasnt done AA. Involved with [facility], completed program last Nov-Feb, was living in sober house at that time. Was sober until first relapse in May. Unemployed at that time, stress driving impulse to drink. Ex-girlfriend also passed away around that time.     Using acamprosate in 0325-5113 (isnt sure exact date), but drank entire time while on it and only took for a few weeks prior to stopping. No side effects when taking. He is open to trying this medication again.  No previous selective serotonin reuptake inhibitor or SNRI trials. He feels that gabapentin is helpful when tryinbg to relax at night prior to bed, interested in continuing this medication.     Previous DUI in 1999. Has felony domestic violence charge on record, no pending legal issues though is on probation currently.     Has historical concussion and seizures from EtOH withdrawal ~3 years ago. No current physical issues or side effects from medications. Did have hallucinations related to DTs prior to recent admission.       Additional Pertinent History (Updated in Epic as appropriate)  PAST PSYCHIATRIC HISTORY  - ADHD formerly treated with atomoxetine in 20s, unsure of dosing  - Never prescribed SSRIs or SNRIs, per patients history he has not met criteria for periods of depression or TRAVIS though had 1 panic attack in early 30s after acute stressor, never had panic attack since.   - Previous detox stay in mid 30s, previous detox admissions at this facility (3A) with most recent " 9/16/2021  - EtOH is primary substance; has tried cannabis, does not use other illicit substances or misuse prescription medications      PHQ-9 scores:  PHQ-9 SCORE 9/20/2021   PHQ-9 Total Score 9     TRAVIS-7 scores:  TRAVIS-7 SCORE 9/20/2021   Total Score 7         SOCIAL HISTORY:  Living situation:  Discharge [facility] on Monday, [facility], [facility] on Tuesday.    Single///partner GF that lives 300 miles away since May, never     Children No children   Support system: GF, sober , has friend from treatment   Employment: Historically working as  in restaurants, currently unemployed. Last worked beginning of September.    Barriers to Care (transportation, childcare, etc): Impulsivity (ADHD), stress   Upcoming Court Date(s): None, on probation currently, [facility]   Consent to Communicate? Yes.      Medical History:    Patient Active Problem List    Diagnosis Date Noted     Chemical dependency (H) 09/22/2021     Priority: Medium     Alcohol abuse 09/16/2021     Priority: Medium     Acute alcoholic intoxication in alcoholism without complication (H) 08/24/2021     Priority: Medium     Alcohol withdrawal (H) 05/29/2021     Priority: Medium     History of Lyme disease 08/02/2018     Priority: Medium     Alcohol dependence in remission (H) 08/02/2018     Priority: Medium       Past Medical History:   Diagnosis Date     Concussion      Substance abuse (H)     Done with treatment on 7/16/2018       Past Surgical History:   Procedure Laterality Date     HAND SURGERY Left        Family History   Problem Relation Age of Onset     Polycystic Kidney Diease Mother      Substance Abuse Mother      Depression Mother      Heart Defect Father      Heart Defect Brother        Current Outpatient Medications   Medication Sig Dispense Refill     folic acid (FOLVITE) 1 MG tablet Take 1 tablet (1 mg) by mouth daily 30 tablet 0      gabapentin (NEURONTIN) 300 MG capsule Take 1 capsule (300 mg) by mouth 3 times daily as needed (anxiety) 30 capsule 1     multivitamin w/minerals (THERA-VIT-M) tablet Take 1 tablet by mouth daily 30 tablet 0     pantoprazole (PROTONIX) 40 MG EC tablet Take 1 tablet (40 mg) by mouth 2 times daily 60 tablet 0     sucralfate (CARAFATE) 1 GM tablet Take 1 tablet (1 g) by mouth 4 times daily 120 tablet 0     thiamine (B-1) 100 MG tablet Take 1 tablet (100 mg) by mouth daily 30 tablet 0       No Known Allergies      REVIEW OF SYSTEMS:  Negative unless otherwise noted in HPI.       OBJECTIVE                                                      EXAM    /74   Pulse 72   Temp 97.7  F (36.5  C) (Temporal)   Wt 76.1 kg (167 lb 11.2 oz)   SpO2 97%   BMI 22.74 kg/m      GENERAL: healthy, alert and no distress  EYES: Eyes grossly normal to inspection, PERRL and conjunctivae and sclerae normal  RESP: No respiratory distress  MENTAL STATUS EXAM  Appearance/Behavior: No appearant distress and Restless  Speech: Normal  Mood/Affect: normal affect  Insight: Adequate      LAB  No results found for any visits on 10/15/21.  AST   Date Value Ref Range Status   09/15/2021 50 (H) 0 - 45 U/L Final   05/31/2021 49 (H) 0 - 45 U/L Final     ALT   Date Value Ref Range Status   09/15/2021 43 0 - 70 U/L Final   05/31/2021 79 (H) 0 - 70 U/L Final             [place] Board of Pharmacy Data Base Reviewed; Consistent with patient reports and Epic records.           A/P                                                      ASSESSMENT/PLAN:    Paulo Franco is a 45 year old male who presents for initial visit for addiction consultation and management referred by [facility] due to concerns for Alcohol Use Disorder (AUD). Relapses seem to be primarily impulsive in nature related to acute stressors. Patient acknowledges using alcohol to slow rapid thoughts when usual coping mechanisms are unable to help. Inability to function due to  racing thoughts and distractibility drive acute stress and thus relapse. Will plan on starting medications to start ADHD related symptoms (anxiety) as well as alcohol cravings.      # Alcohol use disorder, severe  - start acamprosate   - continue gabapentin PRN for cravings/anxiety      # ADHD, unspecified  - start atomoxetine 10 mg daily  - Titrate to improve distractibility, though will keep an eye on drowsiness and reduced creativity as possible SE's      ENCOUNTER FOR LONG TERM USE OF HIGH RISK MEDICATION   High Risk Drug Monitoring?  YES   Drug being monitored: acamprosate   Reason for drug: Alcohol Use Disorder   What is being monitored?: Dosage, Cravings, Triggers and side effects       Counseled the patient on the importance of having a recovery program in addition to medication to manage recovery.  Components include avoiding isolating, having willingness to change, avoiding triggers and managing cravings. Encouraged having some type of sober network and practicing honesty with trusted support person(s). Encouraged other services such as counseling, 12 step or other self-help organizations.      Risk of overdose following a period of abstinence due to decrease tolerance was discussed including risk of death.  Strongly recommended abstain from alcohol, benzodiazepines, THC, opioids and other drugs of abuse.  Increased risk of return to opioid use after use of these substances discussed.  Increased risk of overdose/death with use of other substances particularly benzodiazepines/alcohol reviewed.      RTC   4 weeks, Nov 9th.       Dr. Aydin Rick MD  PGY-2 Psychiatry Resident    Shawn Hinton MD  This note was comprehensively reviewed and edited by myself prior to signing. I was present with the patient and resident during the entire exam, assessment and plan.      Shawn Hinton MD  [facility] Addiction Medicine  [phone]

## 2021-10-15 NOTE — GROUP NOTE
Group Therapy Documentation    PATIENT'S NAME: Paulo Franco  MRN:   [id number]  :   1975  ACCT. NUMBER: [id number]  DATE OF SERVICE: 10/15/21  START TIME:  9:00 AM  END TIME: 10:00 AM  FACILITATOR(S): Jaiden Millan LADC; Mac Walker LADC  TOPIC: BEH Group Therapy  Number of patients attending the group:  10  Group Length:  1 Hour    Group Therapy Type: Recovery strategies    Summary of Group / Topics Discussed:    Recovery Principles      Group Attendance:  Attended group session    Patient's response to the group topic/interactions:  cooperative with task    Patient appeared to be Attentive.        Client specific details:  Antonio attended AM group. Patient checked in, talked about how he's feeling in early recovery. Patient had a doctor's appt at 10 AM.

## 2021-10-15 NOTE — PROGRESS NOTES
MICD Discharge Summary/Instructions     Patient: Paulo Franco  MRN: [id number]   : 1975 Age: 45 year old Sex: male   -  Focus of Treatment / Discharge Recommendations    Personal Safety/ Management of Symptoms    * Follow your safety plan.  Report increased symptoms to your care team and /or go to the nearest Emergency Department.    * Call crisis lines as needed    [facility] [phone]                [facility] [phone]  [facility] [phone]              [facility] [phone]  [facility] [phone]              [facility] [phone]  [facility] [phone]          National Suicide Prevention [phone]  [facility] [phone]            Suicide Prevention [phone]  [facility] [phone]    Abstinence/Relapse Prevention  * Take all medicines as directed.  Carry a current list of medicines with you.  * Use coping skills: exercise, talk to sober and supportive peers, friends, and family. Attend regular AA meetings and work with a sponsor on the 12 Steps. Practice mindfulness, cook for fun, and practice prayer/meditation.   * Do not use illicit (street) drugs, controlled substances (narcotics) or alcohol.    Develop/Improve Independent Living/Socialization Skills: Continue to build upon sober living skills.     Community Resources/Supports and Discharge Planning:       Admit to sober house (\[facility]") and start at [facility].       Client Signature:_______________________   Date / Time:___________  Staff Signature:________________________   Date / Time:___________

## 2021-10-15 NOTE — PROGRESS NOTES
CHEMICAL DEPENDENCY DISCHARGE SUMMARY    PATIENT NAME:  Paulo Franco   :  1975    EVALUATION COUNSELOR: DAVID Crouch  TREATMENT COUNSELORS: DAVID Thompson,  DAVID Gallo  REFERRAL SOURCE:  [facility]  PROGRAM:  [facility]  ADMISSION DATE: 2021  DATE OF LAST SESSION: 10/17/2021  DISCHARGE DATE: 10/18/2021  ADMISSION DIAGNOSIS:    Alcohol Use Disorder, Severe F10.20    DISCHARGE DIAGNOSIS:  Alcohol Use Disorder, Severe F10.20    DISCHARGE STATUS: Patient successfully completed the [facility] program with staff approval.   LAST USE DATE: Patient reported last date of use as 2021  DAYS OF TREATMENT COMPLETED:  Patient completed 28 days of treatment    PRESENTING INFORMATION:  Patient was assessed to be appropriate for [facility] treatment services in the [facility] program.       SERVICES PROVIDED:  Services included assessment, treatment planning and education regarding chemical dependency, mental health, relationships, and relapse prevention.  The patient also participated in individual therapy, group therapy, recovery oriented workshops, spiritual care counseling, recovery skills training, and aftercare planning.    ISSUES ADDRESS IN TREATMENT:    DIMENSION 1 - ACUTE INTOXICATION/WITHDRAWAL POTENTIAL  ADMISSION RISK RATIN  DISCHARGE RISK RATIN  Patient entered into [facility] on 2021. Throughout treatment patient denied any withdrawal symptoms that would interfere with full participation in treatment programming.     DIMENSION 2 - BIOMEDICAL COMPLICATIONS AND CONDITIONS  ADMISSION RISK RATIN  DISCHARGE RISK RATIN  Upon admissions patient denied any medical concerns that would interfere with full participation in treatment programming.  Patient maintained medication compliance throughout treatment. Upon discharge patient appeared able to access medical aid as needed.     DIMENSION 3 - EMOTIONAL,  "BEHAVIORAL, COGNITIVE CONDITIONS AND COMPLICATIONS  ADMISSION RISK RATIN  DISCHARGE RISK RATIN  Upon admissions patient reported a mental health diagnosis of alcohol use disorder. Patient also reported grief and loss. Upon admissions patient was given a suicidal risk screening. Patient was rated as \"low risk\".  Patient attended weekly Spiritual Care group sessions facilitated by Azalia Leong.Upon discharge patient denied any suicidal thoughts or ideations. Patient completed and presented the assignments: \"Grief and Loss\" packet and \"Anxiety and Recovery from Chemical Dependency\".    DIMENSION 4 - READINESS FOR CHANGE  ADMISSION RISK RATIN  DISCHARGE RISK RATIN  Patient appeared to gain motivation for recovery during treatment. Patient appears to be in the preparation Stage of Change at this time. Patient completed and presented the assignments: \"Drug/Alcohol use history\", and  \"1st step\" assignment.     DIMENSION 5 - RELAPSE, CONTINUED USE AND CONTINUED PROBLEM POTENTIAL   ADMISSION RISK RATIN  DISCHARGE RISK RATIN  Patient has a history of relapse and multiple past treatments. Patient attended several Relapse prevention weekend workshops and learned/reviewed their relapse triggers. Patient completed the assignments: \"25 sober coping skills\", \"Identifying Relapse Triggers and Cues\", \"Putting It All Together\"and \"Mistaken Beliefs About Relapse\"    DIMENSION 6 - RECOVERY ENVIRONMENT  ADMISSION RISK RATIN  DISCHARGE RISK RATIN  Patient lacks a sober support network. Pt attended daily sober support meetings while in treatment.  Patient attended several Relationships weekend workshops and learned about healthy/unhealthy relationships, assertive communication, boundaries, and co-dependency. Patient met with aftercare staff and decided to go to [facility] and will begin [facility] program following discharge from [facility].     STRENGTHS: Patient maintained a " positive attitude while in treatment. Patient was an active participant in group therapy and was open for feedback from their counselors and peers. Patient appears motivated for recovery at this time and willing to incorporate positive changes into their  life.     LIVING ARRANGEMENTS AT DISCHARGE: [facility]    PROGNOSIS:  Prognosis for this patient is favorable at this time.        CONTINUING CARE RECOMMENDATIONS AND REFERRALS:    1.  Abstain from all mood-altering chemicals unless prescribed by a licensed medical provider, and take all medications as prescribed.  2.  Attend a minimum of three AA/NA/Sober support groups weekly in the community/online.  3.  Begin working with a sponsor and maintain regular contact with them.  4.  Admit immediately into [facility] [place], and begin [facility] aftercare program. Follow all guidelines.  5.  Continue to invest in building a sober support network.  6.  Continue to monitor and understand relapse triggers and stressors and implement, and continue to development, healthy coping skills.  7.   Attend all scheduled medical appointments.  8. Take medication as prescribed       This information has been disclosed to you from records protected by Federal confidentiality rules (42 CFR part 2). The Federal rules prohibit you from making any further disclosure of this information unless further disclosure is expressly permitted by the written consent of the person to whom it pertains or as otherwise permitted by 42 CFR part 2. A general authorization for the release of medical or other information is NOT sufficient for this purpose. The Federal rules restrict any use of the information to criminally investigate or prosecute any alcohol or drug abuse patient.       DAVID Gallo

## 2021-10-15 NOTE — GROUP NOTE
Psychoeducation Group Documentation    PATIENT'S NAME: Paulo Franco  MRN:   [id number]  :   1975  ACCT. NUMBER: [id number]  DATE OF SERVICE: 10/15/21  START TIME: 12:30 PM  END TIME:  2:30 PM  FACILITATOR(S): Mac Walker LADC; Jaiden Millan LADC  TOPIC: BEH Pyschoeducation  Number of patients attending the group:  10  Group Length:  2 Hours    Skills Group Therapy Type: Recovery skills and Healthy behaviors development    Summary of Group / Topics Discussed:    Relationship/social skills, Balanced lifestyle skills, and Relapse prevention skills          Group Attendance:  Attended group session    Patient's response to the group topic/interactions:  cooperative with task    Patient appeared to be Actively participating and Attentive.         Client specific details:  Antonio gave appropriate feedback..

## 2021-10-15 NOTE — PROGRESS NOTES
[address] and [address] Floors  [place] [address]              Paulo Franco,  1975, was admitted for evaluation/treatment of chemical dependency at [facility]. This person took part in this program:     ______ The Inpatient Program   ______ The Outpatient Program   ___X___ [facility]   ______ Lodging Day Outpatient         Date admitted: 9/20/2021  Date discharged: 10/18/2021     Type of discharge:   ___X___ Satisfactory - completed evaluation / treatment   ________ Discharged without completing   ______ Behavioral discharge   ______ Transferred to another chemical dependency program   ______ Transferred to another type of service   ______ Left against medical advice (AMA) / [facility]               Counselor:  DAVID Gallo and DAVID Thompson      Date: 10/18/2021            Time: 7:08 AM

## 2021-10-16 ENCOUNTER — HOSPITAL ENCOUNTER (OUTPATIENT)
Dept: BEHAVIORAL HEALTH | Facility: CLINIC | Age: 46
End: 2021-10-16
Attending: FAMILY MEDICINE
Payer: COMMERCIAL

## 2021-10-16 VITALS — TEMPERATURE: 97.9 F | OXYGEN SATURATION: 99 %

## 2021-10-16 PROCEDURE — H2035 A/D TX PROGRAM, PER HOUR: HCPCS | Mod: HQ

## 2021-10-16 PROCEDURE — 1002N00001 HC LODGING PLUS FACILITY CHARGE ADULT

## 2021-10-16 NOTE — GROUP NOTE
Group Therapy Documentation    PATIENT'S NAME: Paulo Franco  MRN:   [id number]  :   1975  ACCT. NUMBER: [id number]  DATE OF SERVICE: 10/16/21  START TIME:  9:30 AM  END TIME: 11:30 AM  FACILITATOR(S): Mariam Ferro  TOPIC: BEH Group Therapy  Number of patients attending the group:  10    Group Length:  2 Hours    Group Therapy Type: Recovery strategies, Emotion processing, and Daily living/independence skills    Summary of Group / Topics Discussed:    Relapse prevention      Group Attendance:  Attended group session    Patient's response to the group topic/interactions:  cooperative with task    Patient appeared to be Actively participating, Attentive and Engaged.        Client specific details:  Patient was attentive and participative during lecture.      
Group Therapy Documentation    PATIENT'S NAME: Paulo Franco  MRN:   [id number]  :   1975  ACCT. NUMBER: [id number]  DATE OF SERVICE: 10/16/21  START TIME: 12:30 PM  END TIME:  2:30 PM  FACILITATOR(S): Ree Segovia LADC  TOPIC: BEH Group Therapy  Number of patients attending the group:  9  Group Length:  2 Hours    Group Therapy Type: Recovery strategies    Summary of Group / Topics Discussed:    Co-occurring illnesses symptom management and Relapse prevention      Group Attendance:  Attended group session    Patient's response to the group topic/interactions:  cooperative with task    Patient appeared to be Actively participating.        Client specific details:  Paulo participated in a teaching [place] activity on OCD and KEENAN.         
No

## 2021-10-17 ENCOUNTER — HOSPITAL ENCOUNTER (OUTPATIENT)
Dept: BEHAVIORAL HEALTH | Facility: CLINIC | Age: 46
End: 2021-10-17
Attending: FAMILY MEDICINE
Payer: COMMERCIAL

## 2021-10-17 VITALS — TEMPERATURE: 98 F | OXYGEN SATURATION: 99 %

## 2021-10-17 PROCEDURE — H2035 A/D TX PROGRAM, PER HOUR: HCPCS | Mod: HQ

## 2021-10-17 PROCEDURE — 1002N00001 HC LODGING PLUS FACILITY CHARGE ADULT

## 2021-10-17 NOTE — GROUP NOTE
Group Therapy Documentation    PATIENT'S NAME: Paulo Franco  MRN:   [id number]  :   1975  ACCT. NUMBER: [id number]  DATE OF SERVICE: 10/17/21  START TIME: 12:30 PM  END TIME:  1:30 PM  FACILITATOR(S): Mariam Ferro  TOPIC: BEH Group Therapy  Number of patients attending the group:  9    Group Length:  1 Hours    Group Therapy Type: Health and wellbeing     Summary of Group / Topics Discussed:    Self-care activities      Group Attendance:  Attended group session    Patient's response to the group topic/interactions:  cooperative with task    Patient appeared to be Actively participating, Attentive and Engaged.        Client specific details:  Patient attended a Smoking Cessation workshop and was attentive and participative.

## 2021-10-17 NOTE — GROUP NOTE
Group Therapy Documentation    PATIENT'S NAME: Paulo Franco  MRN:   [id number]  :   1975  ACCT. NUMBER: [id number]  DATE OF SERVICE: 10/17/21  START TIME:  8:45 AM  END TIME: 10:30 AM  FACILITATOR(S): Olga Arce, APRIL; Sandra Lamb LADC  TOPIC: BEH Group Therapy  Number of patients attending the group:  20  Group Length:  2 Hours    Group Therapy Type: Health and wellbeing     Summary of Group / Topics Discussed:    Self-care activities    Group Attendance: Attended group session.    Patients response to the group topic/interactions: cooperative with task.    Patient appeared to be: Actively participated, Attentive, and Engaged.     Client specific details:  Patient attended self-care workshop, presented by lodging plus nurse, Olga Arce RN. Patient was educated about diets, eating disorder, meditation, and sleep. Patient participated in all activities, including meditation.

## 2021-10-17 NOTE — PROGRESS NOTES
Nursing Discharge Planning Meeting    Writer completed discharge planning meeting with patient. Discharge is planned for 10/18/21    Discussed appropriate follow up care to manage KEENAN, MI and medical and to obtain medication refills. Patient given a copy of their current medications for reference. Questions were answered at this time and the patient verbalized an understanding of the post-discharge follow up plan.    Patient  scheduled an appointment with their PCP/ addiction med provider Dr Hinton   Continue to support patient in discharge planning as needed to assure appropriate continuity of care.     Tobacco Cessation  Patient participated in the nicotine replacement therapy for tobacco cessation or reduction during their treatment programming: No not a tobacco user    The patient was provided with community resources for follow-up to continue tobacco cessation support once in the community. Also the patient was encouraged to discuss their tobacco cessation efforts with the primary care provider.

## 2021-10-18 NOTE — ADDENDUM NOTE
Encounter addended by: [facility]an [facility] on: 10/18/2021 10:10 AM   Actions taken: Clinical Note Signed

## 2021-11-09 ENCOUNTER — VIRTUAL VISIT (OUTPATIENT)
Dept: ADDICTION MEDICINE | Facility: CLINIC | Age: 46
End: 2021-11-09
Payer: COMMERCIAL

## 2021-11-09 DIAGNOSIS — F10.20 ALCOHOL USE DISORDER, SEVERE, DEPENDENCE (H): ICD-10-CM

## 2021-11-09 DIAGNOSIS — F90.9 ATTENTION DEFICIT HYPERACTIVITY DISORDER (ADHD), UNSPECIFIED ADHD TYPE: ICD-10-CM

## 2021-11-09 PROCEDURE — 99214 OFFICE O/P EST MOD 30 MIN: CPT | Mod: 95 | Performed by: FAMILY MEDICINE

## 2021-11-09 RX ORDER — ATOMOXETINE 10 MG/1
20 CAPSULE ORAL DAILY
Qty: 60 CAPSULE | Refills: 1 | Status: ON HOLD | OUTPATIENT
Start: 2021-11-09 | End: 2021-12-17

## 2021-11-09 RX ORDER — ACAMPROSATE CALCIUM 333 MG/1
666 TABLET, DELAYED RELEASE ORAL 3 TIMES DAILY
Qty: 180 TABLET | Refills: 1 | Status: ON HOLD | OUTPATIENT
Start: 2021-11-09 | End: 2021-12-17

## 2021-11-09 ASSESSMENT — ANXIETY QUESTIONNAIRES
GAD7 TOTAL SCORE: 5
4. TROUBLE RELAXING: SEVERAL DAYS
6. BECOMING EASILY ANNOYED OR IRRITABLE: SEVERAL DAYS
7. FEELING AFRAID AS IF SOMETHING AWFUL MIGHT HAPPEN: NOT AT ALL
2. NOT BEING ABLE TO STOP OR CONTROL WORRYING: SEVERAL DAYS
GAD7 TOTAL SCORE: 5
3. WORRYING TOO MUCH ABOUT DIFFERENT THINGS: NOT AT ALL
1. FEELING NERVOUS, ANXIOUS, OR ON EDGE: SEVERAL DAYS
GAD7 TOTAL SCORE: 5
7. FEELING AFRAID AS IF SOMETHING AWFUL MIGHT HAPPEN: NOT AT ALL
8. IF YOU CHECKED OFF ANY PROBLEMS, HOW DIFFICULT HAVE THESE MADE IT FOR YOU TO DO YOUR WORK, TAKE CARE OF THINGS AT HOME, OR GET ALONG WITH OTHER PEOPLE?: NOT DIFFICULT AT ALL
5. BEING SO RESTLESS THAT IT IS HARD TO SIT STILL: SEVERAL DAYS

## 2021-11-09 ASSESSMENT — PATIENT HEALTH QUESTIONNAIRE - PHQ9
SUM OF ALL RESPONSES TO PHQ QUESTIONS 1-9: 0
SUM OF ALL RESPONSES TO PHQ QUESTIONS 1-9: 0
10. IF YOU CHECKED OFF ANY PROBLEMS, HOW DIFFICULT HAVE THESE PROBLEMS MADE IT FOR YOU TO DO YOUR WORK, TAKE CARE OF THINGS AT HOME, OR GET ALONG WITH OTHER PEOPLE: NOT DIFFICULT AT ALL

## 2021-11-09 NOTE — PROGRESS NOTES
Paulo is a 46 year old who is being evaluated via a billable video visit.      How would you like to obtain your AVS? Mail a copy  If the video visit is dropped, the invitation should be resent by: Text to cell phone: [phone]  Will anyone else be joining your video visit? No

## 2021-11-09 NOTE — PROGRESS NOTES
[facility] Addiction Medicine    A/P                                                    ASSESSMENT/PLAN  Diagnoses and all orders for this visit:  Alcohol use disorder, severe, dependence (H)  -     atomoxetine (STRATTERA) 10 MG capsule; Take 2 capsules (20 mg) by mouth daily  -     acamprosate (CAMPRAL) 333 MG EC tablet; Take 2 tablets (666 mg) by mouth 3 times daily  Attention deficit hyperactivity disorder (ADHD), unspecified ADHD type  -     atomoxetine (STRATTERA) 10 MG capsule; Take 2 capsules (20 mg) by mouth daily    Orders Placed This Encounter   Medications     atomoxetine (STRATTERA) 10 MG capsule     Sig: Take 2 capsules (20 mg) by mouth daily     Dispense:  60 capsule     Refill:  1     acamprosate (CAMPRAL) 333 MG EC tablet     Sig: Take 2 tablets (666 mg) by mouth 3 times daily     Dispense:  180 tablet     Refill:  1       Problem list updated Nov 9, 2021   No problems updated.      - Continue acamprosate. Cravings low and no return to use  - Titrate Strattera slowly; want to avoid any reduction in creative thoughts, but concentration does continue to improve      PDMP Review       Value Time User    State PDMP site checked  Yes 11/9/2021  3:26 PM Shawn Hinton MD        Answers for HPI/ROS submitted by the patient on 11/9/2021  If you checked off any problems, how difficult have these problems made it for you to do your work, take care of things at home, or get along with other people?: Not difficult at all  PHQ9 TOTAL SCORE: 0  TRAVIS 7 TOTAL SCORE: 5          RTC  Return in about 4 weeks (around 12/7/2021) for video visit, please call to schedule.      Counseled the patient on the importance of having a recovery program in addition to medication to manage recovery.  Components include avoiding isolating, having willingness to change, avoiding triggers and managing cravings. Encouraged having some type of sober network and practicing honesty with trusted support person(s). Encouraged  other services such as counseling, 12 step or other self-help organizations.          SUBJECTIVE                                                    Paulo Franco is a 46 year old male who presents to clinic today for follow up    Visit performed Virtual, via video    Video-Visit Details    Type of service:  Video Visit    Video Start Time: 3:05 pm  Video End Time: 3:20 pm    Originating Location (pt. Location): Home    Distant Location (provider location):  [place] [facility]     Platform used for Video Visit: Tanya      TODAY'S VISIT  HPI Nov 9, 2021  - Minimal cravings  - Continues with [facility] outpatient regularly  - Some stress with his girlfriend, but they are working on it together  - He has noticed some improved distractibilty with strattera, but wanting to see if this improves with higher dose. Has not seen any decrease in creativity, which has happened before  - Taking gabapentin once a day, largely for sleep support      OBJECTIVE                                                    PHYSICAL EXAM:  There were no vitals taken for this visit.    GENERAL: healthy, alert and no distress  EYES: Eyes grossly normal to inspection, PERRL and conjunctivae and sclerae normal  RESP: No respiratory distress  MENTAL STATUS EXAM  Appearance/Behavior: No appearant distress  Speech: Normal  Mood/Affect: normal affect  Insight: Adequate    LAB  No results found for any visits on 11/09/21.      HISTORY                                                    Problem list reviewed & adjusted, as indicated.  Patient Active Problem List   Diagnosis     History of Lyme disease     Acute alcoholic intoxication in alcoholism without complication (H)     Alcohol use disorder, severe, dependence (H)         MEDICATION LIST (prior to visit)  folic acid (FOLVITE) 1 MG tablet, Take 1 tablet (1 mg) by mouth daily  gabapentin (NEURONTIN) 300 MG capsule, Take 1 capsule (300 mg) by mouth 3 times daily as needed (anxiety)  multivitamin  w/minerals (THERA-VIT-M) tablet, Take 1 tablet by mouth daily  pantoprazole (PROTONIX) 40 MG EC tablet, Take 1 tablet (40 mg) by mouth 2 times daily  sucralfate (CARAFATE) 1 GM tablet, Take 1 tablet (1 g) by mouth 4 times daily  thiamine (B-1) 100 MG tablet, Take 1 tablet (100 mg) by mouth daily    Self Administer Medications: Behavioral Services        MEDICATION LIST (after visit)  Current Outpatient Medications   Medication     acamprosate (CAMPRAL) 333 MG EC tablet     atomoxetine (STRATTERA) 10 MG capsule     folic acid (FOLVITE) 1 MG tablet     gabapentin (NEURONTIN) 300 MG capsule     multivitamin w/minerals (THERA-VIT-M) tablet     pantoprazole (PROTONIX) 40 MG EC tablet     sucralfate (CARAFATE) 1 GM tablet     thiamine (B-1) 100 MG tablet     No current facility-administered medications for this visit.     Facility-Administered Medications Ordered in Other Visits   Medication     Self Administer Medications: Behavioral Services         No Known Allergies    Additional MDM Details:  none    Shawn Hinton MD  [facility] Addiction Medicine  [phone]

## 2021-11-10 ASSESSMENT — PATIENT HEALTH QUESTIONNAIRE - PHQ9: SUM OF ALL RESPONSES TO PHQ QUESTIONS 1-9: 0

## 2021-11-10 ASSESSMENT — ANXIETY QUESTIONNAIRES: GAD7 TOTAL SCORE: 5

## 2021-12-09 ENCOUNTER — HOSPITAL ENCOUNTER (EMERGENCY)
Facility: CLINIC | Age: 46
Discharge: HOME | End: 2021-12-10
Attending: EMERGENCY MEDICINE | Admitting: EMERGENCY MEDICINE
Payer: COMMERCIAL

## 2021-12-09 DIAGNOSIS — F10.920 ALCOHOLIC INTOXICATION WITHOUT COMPLICATION (H): ICD-10-CM

## 2021-12-09 PROCEDURE — 82075 ASSAY OF BREATH ETHANOL: CPT | Performed by: EMERGENCY MEDICINE

## 2021-12-09 PROCEDURE — 99284 EMERGENCY DEPT VISIT MOD MDM: CPT | Performed by: EMERGENCY MEDICINE

## 2021-12-09 PROCEDURE — 99283 EMERGENCY DEPT VISIT LOW MDM: CPT | Performed by: EMERGENCY MEDICINE

## 2021-12-10 VITALS
RESPIRATION RATE: 14 BRPM | SYSTOLIC BLOOD PRESSURE: 130 MMHG | OXYGEN SATURATION: 93 % | TEMPERATURE: 98.8 F | WEIGHT: 164 LBS | DIASTOLIC BLOOD PRESSURE: 76 MMHG | BODY MASS INDEX: 22.24 KG/M2 | HEART RATE: 131 BPM

## 2021-12-10 LAB — ALCOHOL BREATH TEST: 0.3 (ref 0–0.01)

## 2021-12-10 PROCEDURE — 250N000011 HC RX IP 250 OP 636: Performed by: EMERGENCY MEDICINE

## 2021-12-10 PROCEDURE — 250N000013 HC RX MED GY IP 250 OP 250 PS 637: Performed by: EMERGENCY MEDICINE

## 2021-12-10 RX ORDER — ONDANSETRON 8 MG/1
8 TABLET, ORALLY DISINTEGRATING ORAL ONCE
Status: COMPLETED | OUTPATIENT
Start: 2021-12-10 | End: 2021-12-10

## 2021-12-10 RX ORDER — LORAZEPAM 1 MG/1
1-4 TABLET ORAL EVERY 30 MIN PRN
Status: DISCONTINUED | OUTPATIENT
Start: 2021-12-10 | End: 2021-12-10 | Stop reason: HOSPADM

## 2021-12-10 RX ADMIN — LORAZEPAM 2 MG: 1 TABLET ORAL at 09:40

## 2021-12-10 RX ADMIN — LORAZEPAM 2 MG: 1 TABLET ORAL at 02:21

## 2021-12-10 RX ADMIN — ONDANSETRON 8 MG: 8 TABLET, ORALLY DISINTEGRATING ORAL at 02:21

## 2021-12-10 RX ADMIN — LORAZEPAM 2 MG: 1 TABLET ORAL at 07:32

## 2021-12-10 NOTE — ED NOTES
11:52 AM  Patient signed out to me as presenting with acute alcohol intoxication and probable discharge when he is clinically sober.  Patient is now clinically sober and is manifesting no evidence of significant alcohol withdrawal.  He will be discharged with resources for pursuing detox and chemical dependency treatment as desired.     Paulo Winslow MD  12/10/21 1154

## 2021-12-10 NOTE — ED NOTES
"Pt repeatedly yelling \"help me, help me.\"  Pt stating he is having hallucination that bugs are crawling all over the wall and the floor.  Pt has changed his alcohol history since being triaged, when asked about the history, \"Pt stated I don't know, I am so confused.\"  According to Pt, he was sober for a time period of between \"30 years\" and \"three months\".  Pt stated he has been drinking for \"five days\".  "

## 2021-12-10 NOTE — ED NOTES
Bed: HW07UR  Expected date: 12/9/21  Expected time: 10:41 PM  Means of arrival:   Comments:  [id number]  46 M  Etoh

## 2021-12-10 NOTE — ED PROVIDER NOTES
[facility] EMERGENCY DEPARTMENT ([facility])  12/09/21 HW07UR  History     Chief Complaint   Patient presents with     Addiction Problem     EMS reports pt coming from sober house, has been drinking for five days and last drink five hours ago. Withdrawing-hallucinating.      HPI  Paulo Franco is a 46 year old male with a past medical history of severe alcohol dependence who presents to the emergency department for evaluation of alcohol problem.  Patient presented to the ED from his sober house reporting that he has been drinking for 5 days with last drink 5 hours prior to arrival.  Patient presents tonight seeking detox and reports history of alcohol withdrawals in visual hallucinations.  Patient states that he has been drinking daily for the past 5 to 7 days and drinking initially 2 pints daily however now up to 1 L daily of vodka.  Patient denies any substance abuse.  Patient reports his symptoms of withdrawal include anxiety, seeing shadows, and seeing stuff on the walls that does not there.  Patient does report history of seizure in the past.  Denies any fever, chills, nausea, vomiting, chest pain, shortness of breath, abdominal pain, no acute medical complaints.  Denies any suicide ideation, homeless ideation, or intent to self-harm.     Past Medical History  Past Medical History:   Diagnosis Date     Concussion      Substance abuse (H)     Done with treatment on 7/16/2018     Past Surgical History:   Procedure Laterality Date     HAND SURGERY Left      atomoxetine (STRATTERA) 10 MG capsule  folic acid (FOLVITE) 1 MG tablet  pantoprazole (PROTONIX) 40 MG EC tablet  sucralfate (CARAFATE) 1 GM tablet  acamprosate (CAMPRAL) 333 MG EC tablet  gabapentin (NEURONTIN) 300 MG capsule  multivitamin w/minerals (THERA-VIT-M) tablet  thiamine (B-1) 100 MG tablet      No Known Allergies  Family History  Family History   Problem Relation Age of Onset     Polycystic Kidney Diease Mother      Substance Abuse Mother       Depression Mother      Heart Defect Father      Heart Defect Brother      Social History   Social History     Tobacco Use     Smoking status: Never Smoker     Smokeless tobacco: Never Used   Substance Use Topics     Alcohol use: Yes     Comment: 1 liters of Vodka per day, last drank 5 hours ago     Drug use: No      Past medical history, past surgical history, medications, allergies, family history, and social history were reviewed with the patient. No additional pertinent items.       Review of Systems  A complete review of systems was performed with pertinent positives and negatives noted in the HPI, and all other systems negative.    Physical Exam   BP: (P) 110/83  Pulse: (!) (P) 128  Temp: (P) 98.2  F (36.8  C)  Resp: (P) 16  Weight: 74.4 kg (164 lb)  SpO2: (P) 95 %  Physical Exam  General: Afebrile, no acute distress   HEENT: Normocephalic, atraumatic, conjunctivae normal. MMM  Neck: non-tender, supple  Cardio: regular rate. regular rhythm   Resp: Normal work of breathing, no respiratory distress, lungs clear bilaterally, no wheezing, rhonchi, rales  Chest/Back: no visual signs of trauma, no CVA tenderness   Abdomen: soft, non distension, no tenderness, no peritoneal signs   Neuro: alert and fully oriented. CN II-XII grossly intact. Grossly normal strength and sensation in all extremities.   MSK: no deformities. Normal range of motion  Integumentary/Skin: no rash visualized, normal color  Psych: normal affect, normal behavior    ED Course      Procedures    Results for orders placed or performed during the hospital encounter of 12/09/21   Alcohol breath test POCT     Status: Abnormal   Result Value Ref Range    Alcohol Breath Test 0.296 (A) 0.00 - 0.01     Medications   LORazepam (ATIVAN) tablet 1-4 mg (2 mg Oral Given 12/10/21 0221)   ondansetron (ZOFRAN-ODT) ODT tab 8 mg (8 mg Oral Given 12/10/21 0221)        Assessments & Plan (with Medical Decision Making)   Paulo Franco is a 46 year old male with a  past medical history of severe alcohol dependence who presents to the emergency department for evaluation of alcohol problem.  Upon arrival patient is well-appearing, afebrile, no distress.  Patient intoxicated initial alcohol breath test 0.296.  Patient placed on MSSA protocol.  Unfortunate this time there are no detox bed here, [facility] remain closed.  We will continue close monitoring the patient, likely discharge home with outpatient resources in the morning.  We will plan on reevaluation.  Patient understands and agrees with the plan.    Patient signed out to morning provider pending re-evaluation and disposition.     I have reviewed the nursing notes. I have reviewed the findings, diagnosis, plan and need for follow up with the patient.    New Prescriptions    No medications on file       Final diagnoses:   Alcoholic intoxication without complication (H)       --  Lorena Andino MD   [facility] EMERGENCY DEPARTMENT  12/9/2021     Lorena Andino MD  12/10/21 0637

## 2021-12-15 ENCOUNTER — HOSPITAL ENCOUNTER (INPATIENT)
Facility: CLINIC | Age: 46
LOS: 1 days | Discharge: OTHER INSTITUTION | End: 2021-12-17
Attending: EMERGENCY MEDICINE | Admitting: PSYCHIATRY & NEUROLOGY
Payer: COMMERCIAL

## 2021-12-15 DIAGNOSIS — F10.930 ALCOHOL WITHDRAWAL SYNDROME WITHOUT COMPLICATION (H): ICD-10-CM

## 2021-12-15 DIAGNOSIS — F10.20 ALCOHOL USE DISORDER, SEVERE, DEPENDENCE (H): ICD-10-CM

## 2021-12-15 DIAGNOSIS — Z11.52 ENCOUNTER FOR SCREENING LABORATORY TESTING FOR SEVERE ACUTE RESPIRATORY SYNDROME CORONAVIRUS 2 (SARS-COV-2): ICD-10-CM

## 2021-12-15 LAB
ALBUMIN SERPL-MCNC: 3.7 G/DL (ref 3.4–5)
ALCOHOL BREATH TEST: 0.32 (ref 0–0.01)
ALP SERPL-CCNC: 87 U/L (ref 40–150)
ALT SERPL W P-5'-P-CCNC: 353 U/L (ref 0–70)
AMPHETAMINES UR QL SCN: NORMAL
ANION GAP SERPL CALCULATED.3IONS-SCNC: 8 MMOL/L (ref 3–14)
AST SERPL W P-5'-P-CCNC: 260 U/L (ref 0–45)
BARBITURATES UR QL: NORMAL
BASOPHILS # BLD AUTO: 0 10E3/UL (ref 0–0.2)
BASOPHILS NFR BLD AUTO: 1 %
BENZODIAZ UR QL: NORMAL
BILIRUB SERPL-MCNC: 0.3 MG/DL (ref 0.2–1.3)
BUN SERPL-MCNC: 12 MG/DL (ref 7–30)
CALCIUM SERPL-MCNC: 8.8 MG/DL (ref 8.5–10.1)
CANNABINOIDS UR QL SCN: NORMAL
CHLORIDE BLD-SCNC: 99 MMOL/L (ref 94–109)
CO2 SERPL-SCNC: 30 MMOL/L (ref 20–32)
COCAINE UR QL: NORMAL
CREAT SERPL-MCNC: 0.72 MG/DL (ref 0.66–1.25)
EOSINOPHIL # BLD AUTO: 0 10E3/UL (ref 0–0.7)
EOSINOPHIL NFR BLD AUTO: 0 %
ERYTHROCYTE [DISTWIDTH] IN BLOOD BY AUTOMATED COUNT: 13.1 % (ref 10–15)
GFR SERPL CREATININE-BSD FRML MDRD: >90 ML/MIN/1.73M2
GLUCOSE BLD-MCNC: 136 MG/DL (ref 70–99)
HCT VFR BLD AUTO: 38.7 % (ref 40–53)
HGB BLD-MCNC: 13.8 G/DL (ref 13.3–17.7)
IMM GRANULOCYTES # BLD: 0 10E3/UL
IMM GRANULOCYTES NFR BLD: 0 %
LYMPHOCYTES # BLD AUTO: 2.2 10E3/UL (ref 0.8–5.3)
LYMPHOCYTES NFR BLD AUTO: 40 %
MAGNESIUM SERPL-MCNC: 2.5 MG/DL (ref 1.6–2.3)
MCH RBC QN AUTO: 32.1 PG (ref 26.5–33)
MCHC RBC AUTO-ENTMCNC: 35.7 G/DL (ref 31.5–36.5)
MCV RBC AUTO: 90 FL (ref 78–100)
MONOCYTES # BLD AUTO: 0.5 10E3/UL (ref 0–1.3)
MONOCYTES NFR BLD AUTO: 9 %
NEUTROPHILS # BLD AUTO: 2.7 10E3/UL (ref 1.6–8.3)
NEUTROPHILS NFR BLD AUTO: 50 %
NRBC # BLD AUTO: 0 10E3/UL
NRBC BLD AUTO-RTO: 0 /100
OPIATES UR QL SCN: NORMAL
PLATELET # BLD AUTO: 218 10E3/UL (ref 150–450)
POTASSIUM BLD-SCNC: 3.4 MMOL/L (ref 3.4–5.3)
PROT SERPL-MCNC: 8 G/DL (ref 6.8–8.8)
RBC # BLD AUTO: 4.3 10E6/UL (ref 4.4–5.9)
SARS-COV-2 RNA RESP QL NAA+PROBE: NEGATIVE
SODIUM SERPL-SCNC: 137 MMOL/L (ref 133–144)
WBC # BLD AUTO: 5.4 10E3/UL (ref 4–11)

## 2021-12-15 PROCEDURE — C9803 HOPD COVID-19 SPEC COLLECT: HCPCS

## 2021-12-15 PROCEDURE — 96361 HYDRATE IV INFUSION ADD-ON: CPT

## 2021-12-15 PROCEDURE — 258N000003 HC RX IP 258 OP 636

## 2021-12-15 PROCEDURE — 84439 ASSAY OF FREE THYROXINE: CPT | Performed by: PSYCHIATRY & NEUROLOGY

## 2021-12-15 PROCEDURE — U0005 INFEC AGEN DETEC AMPLI PROBE: HCPCS | Performed by: EMERGENCY MEDICINE

## 2021-12-15 PROCEDURE — 83735 ASSAY OF MAGNESIUM: CPT | Performed by: EMERGENCY MEDICINE

## 2021-12-15 PROCEDURE — 250N000013 HC RX MED GY IP 250 OP 250 PS 637: Performed by: EMERGENCY MEDICINE

## 2021-12-15 PROCEDURE — 82075 ASSAY OF BREATH ETHANOL: CPT

## 2021-12-15 PROCEDURE — 82977 ASSAY OF GGT: CPT | Performed by: PSYCHIATRY & NEUROLOGY

## 2021-12-15 PROCEDURE — 36415 COLL VENOUS BLD VENIPUNCTURE: CPT | Performed by: EMERGENCY MEDICINE

## 2021-12-15 PROCEDURE — 80307 DRUG TEST PRSMV CHEM ANLYZR: CPT | Performed by: EMERGENCY MEDICINE

## 2021-12-15 PROCEDURE — 250N000011 HC RX IP 250 OP 636: Performed by: EMERGENCY MEDICINE

## 2021-12-15 PROCEDURE — 85025 COMPLETE CBC W/AUTO DIFF WBC: CPT | Performed by: EMERGENCY MEDICINE

## 2021-12-15 PROCEDURE — 80053 COMPREHEN METABOLIC PANEL: CPT | Performed by: EMERGENCY MEDICINE

## 2021-12-15 PROCEDURE — 99285 EMERGENCY DEPT VISIT HI MDM: CPT | Mod: 25

## 2021-12-15 PROCEDURE — 84443 ASSAY THYROID STIM HORMONE: CPT | Performed by: PSYCHIATRY & NEUROLOGY

## 2021-12-15 PROCEDURE — 96374 THER/PROPH/DIAG INJ IV PUSH: CPT

## 2021-12-15 PROCEDURE — 258N000003 HC RX IP 258 OP 636: Performed by: EMERGENCY MEDICINE

## 2021-12-15 PROCEDURE — 99283 EMERGENCY DEPT VISIT LOW MDM: CPT | Performed by: EMERGENCY MEDICINE

## 2021-12-15 RX ORDER — MULTIVITAMIN,THERAPEUTIC
1 TABLET ORAL ONCE
Status: COMPLETED | OUTPATIENT
Start: 2021-12-15 | End: 2021-12-15

## 2021-12-15 RX ORDER — SODIUM CHLORIDE 9 MG/ML
INJECTION, SOLUTION INTRAVENOUS
Status: COMPLETED
Start: 2021-12-15 | End: 2021-12-15

## 2021-12-15 RX ORDER — DIAZEPAM 5 MG
5-20 TABLET ORAL EVERY 30 MIN PRN
Status: DISCONTINUED | OUTPATIENT
Start: 2021-12-15 | End: 2021-12-16

## 2021-12-15 RX ORDER — FOLIC ACID 1 MG/1
1 TABLET ORAL ONCE
Status: COMPLETED | OUTPATIENT
Start: 2021-12-15 | End: 2021-12-15

## 2021-12-15 RX ORDER — ONDANSETRON 2 MG/ML
4 INJECTION INTRAMUSCULAR; INTRAVENOUS ONCE
Status: COMPLETED | OUTPATIENT
Start: 2021-12-15 | End: 2021-12-15

## 2021-12-15 RX ADMIN — SODIUM CHLORIDE 1000 ML: 9 INJECTION, SOLUTION INTRAVENOUS at 16:04

## 2021-12-15 RX ADMIN — DIAZEPAM 10 MG: 5 TABLET ORAL at 23:09

## 2021-12-15 RX ADMIN — THIAMINE HCL TAB 100 MG 100 MG: 100 TAB at 16:04

## 2021-12-15 RX ADMIN — ONDANSETRON 4 MG: 2 INJECTION INTRAMUSCULAR; INTRAVENOUS at 16:34

## 2021-12-15 RX ADMIN — FOLIC ACID 1 MG: 1 TABLET ORAL at 16:04

## 2021-12-15 RX ADMIN — DIAZEPAM 5 MG: 5 TABLET ORAL at 19:04

## 2021-12-15 RX ADMIN — THERA TABS 1 TABLET: TAB at 16:04

## 2021-12-15 RX ADMIN — DIAZEPAM 10 MG: 5 TABLET ORAL at 20:25

## 2021-12-15 RX ADMIN — SODIUM CHLORIDE 1000 ML: 9 INJECTION, SOLUTION INTRAVENOUS at 16:49

## 2021-12-15 ASSESSMENT — MIFFLIN-ST. JEOR: SCORE: 1643.76

## 2021-12-15 NOTE — PLAN OF CARE
/76 (BP Location: Right arm)   Pulse (!) 139   Temp 98.4  F (36.9  C) (Oral)   Resp 16   SpO2 93%     Alert/oriented x4, detoxing, makes it known he needs to quit drinking and is here to go to IP treatment. C/o Nausea - zofran IV - IVF continued -bag 2 started.  Denies pain.  Sitting up in chair, able to make needs known.

## 2021-12-16 PROBLEM — F10.939 ALCOHOL WITHDRAWAL (H): Status: ACTIVE | Noted: 2021-12-16

## 2021-12-16 LAB
ALBUMIN SERPL-MCNC: 3 G/DL (ref 3.4–5)
ALP SERPL-CCNC: 70 U/L (ref 40–150)
ALT SERPL W P-5'-P-CCNC: 219 U/L (ref 0–70)
AST SERPL W P-5'-P-CCNC: 108 U/L (ref 0–45)
BILIRUB DIRECT SERPL-MCNC: 0.1 MG/DL (ref 0–0.2)
BILIRUB SERPL-MCNC: 0.4 MG/DL (ref 0.2–1.3)
GGT SERPL-CCNC: 112 U/L (ref 0–75)
PROT SERPL-MCNC: 6.6 G/DL (ref 6.8–8.8)
T4 FREE SERPL-MCNC: 0.98 NG/DL (ref 0.76–1.46)
TSH SERPL DL<=0.005 MIU/L-ACNC: 0.13 MU/L (ref 0.4–4)

## 2021-12-16 PROCEDURE — 99223 1ST HOSP IP/OBS HIGH 75: CPT | Mod: AI | Performed by: PSYCHIATRY & NEUROLOGY

## 2021-12-16 PROCEDURE — 250N000013 HC RX MED GY IP 250 OP 250 PS 637: Performed by: EMERGENCY MEDICINE

## 2021-12-16 PROCEDURE — 250N000013 HC RX MED GY IP 250 OP 250 PS 637: Performed by: PSYCHIATRY & NEUROLOGY

## 2021-12-16 PROCEDURE — 36415 COLL VENOUS BLD VENIPUNCTURE: CPT | Performed by: PSYCHIATRY & NEUROLOGY

## 2021-12-16 PROCEDURE — H2032 ACTIVITY THERAPY, PER 15 MIN: HCPCS

## 2021-12-16 PROCEDURE — HZ2ZZZZ DETOXIFICATION SERVICES FOR SUBSTANCE ABUSE TREATMENT: ICD-10-PCS | Performed by: PSYCHIATRY & NEUROLOGY

## 2021-12-16 PROCEDURE — 128N000004 HC R&B CD ADULT

## 2021-12-16 PROCEDURE — 80076 HEPATIC FUNCTION PANEL: CPT | Performed by: PSYCHIATRY & NEUROLOGY

## 2021-12-16 PROCEDURE — 99232 SBSQ HOSP IP/OBS MODERATE 35: CPT | Performed by: PHYSICIAN ASSISTANT

## 2021-12-16 PROCEDURE — 99207 PR CONSULT E&M CHANGED TO SUBSEQUENT LEVEL: CPT | Performed by: PHYSICIAN ASSISTANT

## 2021-12-16 RX ORDER — ONDANSETRON 4 MG/1
4 TABLET, FILM COATED ORAL EVERY 6 HOURS PRN
Status: DISCONTINUED | OUTPATIENT
Start: 2021-12-16 | End: 2021-12-17 | Stop reason: HOSPADM

## 2021-12-16 RX ORDER — FOLIC ACID 1 MG/1
1 TABLET ORAL DAILY
Status: DISCONTINUED | OUTPATIENT
Start: 2021-12-16 | End: 2021-12-17 | Stop reason: HOSPADM

## 2021-12-16 RX ORDER — LORAZEPAM 1 MG/1
1-4 TABLET ORAL EVERY 30 MIN PRN
Status: DISCONTINUED | OUTPATIENT
Start: 2021-12-16 | End: 2021-12-17 | Stop reason: HOSPADM

## 2021-12-16 RX ORDER — HYDROXYZINE HYDROCHLORIDE 25 MG/1
25 TABLET, FILM COATED ORAL EVERY 4 HOURS PRN
Status: DISCONTINUED | OUTPATIENT
Start: 2021-12-16 | End: 2021-12-17 | Stop reason: HOSPADM

## 2021-12-16 RX ORDER — ATENOLOL 50 MG/1
50 TABLET ORAL DAILY PRN
Status: DISCONTINUED | OUTPATIENT
Start: 2021-12-16 | End: 2021-12-17 | Stop reason: HOSPADM

## 2021-12-16 RX ORDER — AMOXICILLIN 250 MG
1 CAPSULE ORAL 2 TIMES DAILY PRN
Status: DISCONTINUED | OUTPATIENT
Start: 2021-12-16 | End: 2021-12-17 | Stop reason: HOSPADM

## 2021-12-16 RX ORDER — ACETAMINOPHEN 325 MG/1
650 TABLET ORAL EVERY 4 HOURS PRN
Status: DISCONTINUED | OUTPATIENT
Start: 2021-12-16 | End: 2021-12-16

## 2021-12-16 RX ORDER — TRAZODONE HYDROCHLORIDE 50 MG/1
50 TABLET, FILM COATED ORAL
Status: DISCONTINUED | OUTPATIENT
Start: 2021-12-16 | End: 2021-12-17 | Stop reason: HOSPADM

## 2021-12-16 RX ORDER — MULTIPLE VITAMINS W/ MINERALS TAB 9MG-400MCG
1 TAB ORAL DAILY
Status: DISCONTINUED | OUTPATIENT
Start: 2021-12-16 | End: 2021-12-17 | Stop reason: HOSPADM

## 2021-12-16 RX ORDER — MAGNESIUM HYDROXIDE/ALUMINUM HYDROXICE/SIMETHICONE 120; 1200; 1200 MG/30ML; MG/30ML; MG/30ML
30 SUSPENSION ORAL EVERY 4 HOURS PRN
Status: DISCONTINUED | OUTPATIENT
Start: 2021-12-16 | End: 2021-12-17 | Stop reason: HOSPADM

## 2021-12-16 RX ORDER — PANTOPRAZOLE SODIUM 40 MG/1
40 TABLET, DELAYED RELEASE ORAL
Status: DISCONTINUED | OUTPATIENT
Start: 2021-12-17 | End: 2021-12-16

## 2021-12-16 RX ORDER — LOPERAMIDE HCL 2 MG
2 CAPSULE ORAL 4 TIMES DAILY PRN
Status: DISCONTINUED | OUTPATIENT
Start: 2021-12-16 | End: 2021-12-17 | Stop reason: HOSPADM

## 2021-12-16 RX ORDER — PANTOPRAZOLE SODIUM 40 MG/1
40 TABLET, DELAYED RELEASE ORAL 2 TIMES DAILY
Status: DISCONTINUED | OUTPATIENT
Start: 2021-12-16 | End: 2021-12-17 | Stop reason: HOSPADM

## 2021-12-16 RX ORDER — ACAMPROSATE CALCIUM 333 MG/1
666 TABLET, DELAYED RELEASE ORAL 3 TIMES DAILY
Status: DISCONTINUED | OUTPATIENT
Start: 2021-12-16 | End: 2021-12-17 | Stop reason: HOSPADM

## 2021-12-16 RX ADMIN — MULTIPLE VITAMINS W/ MINERALS TAB 1 TABLET: TAB at 08:31

## 2021-12-16 RX ADMIN — PANTOPRAZOLE SODIUM 40 MG: 40 TABLET, DELAYED RELEASE ORAL at 12:33

## 2021-12-16 RX ADMIN — PANTOPRAZOLE SODIUM 40 MG: 40 TABLET, DELAYED RELEASE ORAL at 20:25

## 2021-12-16 RX ADMIN — THIAMINE HCL TAB 100 MG 100 MG: 100 TAB at 08:31

## 2021-12-16 RX ADMIN — LORAZEPAM 2 MG: 1 TABLET ORAL at 04:55

## 2021-12-16 RX ADMIN — LORAZEPAM 2 MG: 1 TABLET ORAL at 12:33

## 2021-12-16 RX ADMIN — ACAMPROSATE CALCIUM 666 MG: 333 TABLET, DELAYED RELEASE ORAL at 16:34

## 2021-12-16 RX ADMIN — ACAMPROSATE CALCIUM 666 MG: 333 TABLET, DELAYED RELEASE ORAL at 20:25

## 2021-12-16 RX ADMIN — FOLIC ACID 1 MG: 1 TABLET ORAL at 08:31

## 2021-12-16 RX ADMIN — DIAZEPAM 10 MG: 5 TABLET ORAL at 00:24

## 2021-12-16 ASSESSMENT — ACTIVITIES OF DAILY LIVING (ADL)
DRESS: INDEPENDENT
ORAL_HYGIENE: INDEPENDENT
ORAL_HYGIENE: INDEPENDENT
HYGIENE/GROOMING: INDEPENDENT
HYGIENE/GROOMING: INDEPENDENT
DRESS: INDEPENDENT
ORAL_HYGIENE: INDEPENDENT
HYGIENE/GROOMING: INDEPENDENT
DRESS: INDEPENDENT

## 2021-12-16 NOTE — PLAN OF CARE
Behavioral Team Discussion: (12/16/2021)    Continued Stay Criteria/Rationale: Patient admitted for alcohol withdrawal, complicated.  Plan: The following services will be provided to the patient; psychiatric assessment, medication management, therapeutic milieu, individual and group support, and skills groups.   Participants: 3A Provider: Dr. Yasir Gonzales MD; 3A RN: Marshall Mcintosh RN; 3A CM's: Mya Cabrera  and Lesa Montes.  Summary/Recommendation: Providers will assess today for treatment recommendations, discharge planning, and aftercare plans. CM will meet with pt for discharge planning.   Medical/Physical: Internal medicine consult to be completed 12/16/2021.  Precautions:   Behavioral Orders   Procedures     Code 1 - Restrict to Unit     Routine Programming     As clinically indicated     Seizure precautions     Status 15     Every 15 minutes.     Withdrawal precautions     Rationale for change in precautions or plan: N/A  Progress: No Change.

## 2021-12-16 NOTE — PROGRESS NOTES
12/16/21 0038   Patient Belongings   Did you bring any home meds/supplements to the hospital?  No   Patient Belongings remains with patient;locker   Patient Belongings Remaining with Patient clothing   Patient Belongings Put in Hospital Secure Location (Security or Locker, etc.) cash/credit card;cell phone/electronics;clothing;keys;money (see comment);watch;wallet;shoes   Belongings Search Yes   Clothing Search Yes   Second Staff Ildefonso Lorenz All of patient's belongings are in a bin, his discharge bin and his room. His important cards, a watch and cash of $274.00 are in a security envelop.     BIN:  Jogging pants, 2 shirts, shoes and socks, a coat, coins, an earphone (one)     DISCHARGE BIN:  Phone, wallet and keys    ROOM:  Some clothing    SECURITY ENV # [id number] - [place] 's License, Social Security Card, 2 [employer] Cards, 3 [employer]ards, [place] EBT Card, Go To Card, a watch, Ness $274.00    A               Admission:  I am responsible for any personal items that are not sent to the safe or pharmacy.  [facility] is not responsible for loss, theft or damage of any property in my possession.    Signature:  _________________________________ Date: _______  Time: _____                                              Staff Signature:  ____________________________ Date: ________  Time: _____      2nd Staff person, if patient is unable/unwilling to sign:    Signature: ________________________________ Date: ________  Time: _____     Discharge:  [facility] has returned all of my personal belongings:    Signature: _________________________________ Date: ________  Time: _____                                          Staff Signature:  ____________________________ Date: ________  Time: _____

## 2021-12-16 NOTE — PROGRESS NOTES
Met with Pt for discharge planning.  Pt reports a plan of entering long term treatment at [facility] in [place], [place].  Pt reports he was in [facility] and decided he needed a longer term program.  [facility] counselor faxed assessment.  Pt signed ADILIA and this was faxed to [facility].  Will fax clinical information when available.  Pt reports he can return to [facility] to wait for bed at [facility] in needed.  Update:  Clinical faxed to program.  Writer will follow up on 12/17/21.

## 2021-12-16 NOTE — PROGRESS NOTES
Pt.scored 10 on MSSA. Got 2mg ativan. No safety or behavioral concerns. Will continue to monitor.

## 2021-12-16 NOTE — PLAN OF CARE
Pt continues to be monitored for alcohol withdrawal. MSSA = 6 and 11 this shift. He received ativan 2 mg this shift.  Pt symptoms are minimal. He is mostly visible in the lounge. He is pleasant and cooperative. He denies SI/SIB/HI.  Discharge plan is still being formulated.  BP (!) 143/95 (BP Location: Left arm)   Pulse 101   Temp 97.7  F (36.5  C) (Temporal)   Resp 16   Ht 1.829 m (6')   Wt 72.6 kg (160 lb)   SpO2 98%   BMI 21.70 kg/m

## 2021-12-16 NOTE — PLAN OF CARE
"46yr M admitted to station 3A under Dr. Gonzales for alcohol withdrawal.     Pt has been drinking 1L or more of vodka daily for the past week. Pts last drink was prior to ED arrival. Breathalyzer was 0.323 at 1510.     Pt is cooperative and pleasant during admission process. Pt appears anxious and experiencing symptoms of withdrawal during admission process. Pt reports experiencing \"pretty high\" depression. Pt denies any thoughts of not wanting to be alive. Pt denies SI/SIB.     Pt plans to go to [facility] residential treatment once out of detox.    Pts MSSA score upon arrival was 13 and was given 10mg of Valium. At time of note pt has received a total of 35mg of valium since 1900.    Pt labs show elevated liver enzymes. On-call provider notified and ordered ativan to replace valium.    Pt reports he has not taken any scheduled medications since relapsing \"about a weeks ago.\" No PTA medications were ordered at this time.    Pt has hx of seizures. Pt states last seizure was over 1 year ago. Placed on seizure precautions.      Reported off to oncoming RN    Continue to monitor for alcohol withdrawal per MSSA protocol.       "

## 2021-12-16 NOTE — PHARMACY-ADMISSION MEDICATION HISTORY
Admission Medication History Completed by Pharmacy    See [facility] Admission Navigator for allergy information, preferred outpatient pharmacy, prior to admission medications and immunization status.     Medication history sources:  Patient via phone; Surescripts dispense report    Pertinent changes made to PTA medication list:  Added: N/A  Deleted:   - Gabapentin 300 mg capsule (per patient - stopped taking, did not like side effects)   Changed: N/A    Additional medication history information:   - Patient reports taking all of his medications about 1 week ago.   - Patient denies taking any additional Rx/OTC medications other than the ones listed below.    Prior to Admission medications    Medication Sig Last Dose Taking? Auth Provider   acamprosate (CAMPRAL) 333 MG EC tablet Take 2 tablets (666 mg) by mouth 3 times daily Past Week Yes Shawn Hinton MD   atomoxetine (STRATTERA) 10 MG capsule Take 2 capsules (20 mg) by mouth daily Past Week Yes Shawn Hinton MD   folic acid (FOLVITE) 1 MG tablet Take 1 tablet (1 mg) by mouth daily Past Week Yes Yasir Gonzales MD   multivitamin w/minerals (THERA-VIT-M) tablet Take 1 tablet by mouth daily Past Week Yes Yasir Gonzales MD   pantoprazole (PROTONIX) 40 MG EC tablet Take 1 tablet (40 mg) by mouth 2 times daily Past Week Yes Yasir Gonzales MD   sucralfate (CARAFATE) 1 GM tablet Take 1 tablet (1 g) by mouth 4 times daily Past Week Yes Yasir Gonzales MD   thiamine (B-1) 100 MG tablet Take 1 tablet (100 mg) by mouth daily Past Week Yes Yasir Gonzales MD          Date completed: 12/16/21    Medication history completed by:   Dorie Fontana, Fly  [facility] Building: [facility] *[id number]

## 2021-12-16 NOTE — CONSULTS
Internal Medicine Initial Visit      Paulo Franco MRN# [id number]   YOB: 1975 Age: 46 year old   Date of Admission: 12/15/2021  PCP: No Ref-Primary, Physician    Referring Provider: Behavioral Health - Merritt Sheikh MD  Reason for Visit: General Medical Evaluation, EtOH dependence and withdrawal         Assessment and Recommendations:   Paulo Franco is a 46 year old year old man with a history of polysubstance us d/o, EtOH dependence, Hx of DT, Hx of EtOH-withdrawal seizure, esophagitis who is admitted to station 3A with EtOH dependence and withdrawal.  .        EtOH dependence and withdrawal   Hx of DT  Hx of withdrawal seizure (early 2020)  Polysubstance use d/o.   Drinks 1pint-1L EtOH/day, last drink afternoon of 12/15/21. EtOH breath 0.323, urine tox screen negative.   - Management per psychiatry team.    - agree with B1, B21, MVI, folate replacement    Hx Esophagitis (resolved)  Hx of hematemesis (resolved)  Occurs in the setting of EtOH dependence  - Hospitalized in 6/2021 for esophagitis w/ hematemesis.   - protonix 40mg daily, continue on discharge    Chronic cough  Pt states occurs in the setting of lactose intolerance/dairy intake  - encourage avoidance of dairy/offending foods  - suspect PPI will be helpful for reflex and likely post-nasal drip    Medicine will sign off, please page with any additional concerns.     Carey Contreras PA-C  Hospitalist Service   Pager:   [facility] Paging/Directory     Reason for Admission:   EtOH dependence and withdrawal           History of Present Illness:   History is obtained from the patient and medical record.     Paulo Franco is a 46 year old year old man with a history of polysubstance us d/o, EtOH dependence, Hx of DT, Hx of EtOH-withdrawal seizure, esophagitis who is admitted to station 3A with EtOH dependence and withdrawal.    Internal Medicine service was asked to see patient for a general medication evaluation. Currently, patient notes that he  "is feeling well and much better than he has in the setting of prior withdrawal episodes.  He had some nausea without vomiting after having 3 glasses of orange juice and suspects it was \"too much acid\" and nausea has resolved. He denies having sx similar to prior esophagitis and denies vomiting of hematemesis.     He has some mild generalized abdominal pain to palpation that he says he believes is muscular.                Review of Systems:     10 point ROS was performed and negative unless otherwise noted in HPI.    Denies N/V, F/C, chest pain, sob, palpitations, loss of appetite, rashes, lumps, lesions, urinary complaints (including dysuria), changes to bowels (including diarrhea, constipation), bleeding (including epistaxis, bleeding gums, hematuria, melena, hematochezia) or other complaints.              Past Medical History:   Reviewed and updated in Epic.  Past Medical History:   Diagnosis Date     Concussion      Substance abuse (H)     Done with treatment on 7/16/2018             Past Surgical History:   Reviewed and updated in Epic.  Past Surgical History:   Procedure Laterality Date     HAND SURGERY Left              Social History:     Social History     Tobacco Use     Smoking status: Never Smoker     Smokeless tobacco: Never Used   Substance Use Topics     Alcohol use: Yes     Comment: 1 liters of Vodka per day, last drank 5 hours ago     Drug use: No             Family History:   Reviewed and updated in Epic.  Family History   Problem Relation Age of Onset     Polycystic Kidney Diease Mother      Substance Abuse Mother      Depression Mother      Heart Defect Father      Heart Defect Brother              Allergies:   No Known Allergies          Medications:     Medications Prior to Admission   Medication Sig Dispense Refill Last Dose     acamprosate (CAMPRAL) 333 MG EC tablet Take 2 tablets (666 mg) by mouth 3 times daily 180 tablet 1 12/14/2021 at Unknown time     atomoxetine (STRATTERA) 10 MG capsule " Take 2 capsules (20 mg) by mouth daily 60 capsule 1 12/14/2021 at Unknown time     folic acid (FOLVITE) 1 MG tablet Take 1 tablet (1 mg) by mouth daily 30 tablet 0 More than a month at Unknown time     gabapentin (NEURONTIN) 300 MG capsule Take 1 capsule (300 mg) by mouth 3 times daily as needed (anxiety) 45 capsule 1 More than a month at Unknown time     multivitamin w/minerals (THERA-VIT-M) tablet Take 1 tablet by mouth daily 30 tablet 0 More than a month at Unknown time     pantoprazole (PROTONIX) 40 MG EC tablet Take 1 tablet (40 mg) by mouth 2 times daily 60 tablet 0 More than a month at Unknown time     sucralfate (CARAFATE) 1 GM tablet Take 1 tablet (1 g) by mouth 4 times daily 120 tablet 0 More than a month at Unknown time     thiamine (B-1) 100 MG tablet Take 1 tablet (100 mg) by mouth daily 30 tablet 0 More than a month at Unknown time        Current Facility-Administered Medications   Medication     acetaminophen (TYLENOL) tablet 650 mg     alum & mag hydroxide-simethicone (MAALOX) suspension 30 mL     atenolol (TENORMIN) tablet 50 mg     folic acid (FOLVITE) tablet 1 mg     hydrOXYzine (ATARAX) tablet 25 mg     loperamide (IMODIUM) capsule 2 mg     LORazepam (ATIVAN) tablet 1-4 mg     multivitamin w/minerals (THERA-VIT-M) tablet 1 tablet     ondansetron (ZOFRAN) tablet 4 mg     senna-docusate (SENOKOT-S/PERICOLACE) 8.6-50 MG per tablet 1 tablet     thiamine (B-1) tablet 100 mg     traZODone (DESYREL) tablet 50 mg     Facility-Administered Medications Ordered in Other Encounters   Medication     Self Administer Medications: Behavioral Services            Physical Exam:   Blood pressure 106/73, pulse 89, temperature 97.6  F (36.4  C), temperature source Temporal, resp. rate 14, height 1.829 m (6'), weight 72.6 kg (160 lb), SpO2 94 %.  Body mass index is 21.7 kg/m .  GENERAL: Alert and oriented x 3. NAD, ambulatory without assistance and normal gait, cooperative  HEENT: Anicteric sclera. Mucous membranes  moist. Pupils equal and round. EOMI. NC. AT.  CV: tachycardic, RRR. S1, S2. No murmurs appreciated.   RESPIRATORY: Effort normal on room air. Lungs CTAB with no wheezing, rales, rhonchi.   GI: + mild tenderness to palpation roselyn RUQ, no preciado's sign. Abdomen soft, soft, NABS  MUSCULOSKELETAL: No joint swelling or tenderness.  NEUROLOGICAL: No focal deficits. Moves all extremities.   EXTREMITIES: No asterixis. No peripheral edema. Intact bilateral pedal pulses.   SKIN: No jaundice. No rashes.           Data:   CBC:  Recent Labs   Lab Test 12/15/21  1616   WBC 5.4   RBC 4.30*   HGB 13.8   HCT 38.7*   MCV 90   MCH 32.1   MCHC 35.7   RDW 13.1          CMP:  Recent Labs   Lab Test 12/15/21  1616      POTASSIUM 3.4   CHLORIDE 99   TIMOTHY 8.8   CO2 30   BUN 12   CR 0.72   *   *   *   BILITOTAL 0.3   ALBUMIN 3.7   PROTTOTAL 8.0   ALKPHOS 87       TSH:  TSH   Date Value Ref Range Status   12/15/2021 0.13 (L) 0.40 - 4.00 mU/L Final   05/30/2021 1.04 0.40 - 4.00 mU/L Final       Tox screen: EtOH breath 0.323, urine tox screen negative.   HCG: n/a    Unresulted Labs Ordered in the Past 30 Days of this Admission     No orders found for last 31 day(s).

## 2021-12-16 NOTE — H&P
Identifying information  Patient is a 46-year old  male he is a  he is known to me from previous admissions    Chief complaint alcohol    History of present illness    Patient is a 46-year-old  male who is known to me from previous admission patient came to the emergency room his been drinking 1 L of alcohol daily.  On 12/9/2021 patient came to the emergency room wanting help for his drinking there were no beds he went home and he continued to drink    Patient reports he stopped taking his medications since relapsing.  He had a lot of nausea and felt shaky he was also seeing shadows stuff on the walls that were noted in the past when he was in the emergency room on 12/9/2021  He is drinking 1 L of alcohol  He reports this particular relapse is going for 5 to 7 days  He does have history of complicated withdrawal including shakes      Patient has been using the following substances: Alcohol  Started at age12 , became a problem at 20's     Patient has tolerance, withdrawal, progressive use, loss of control, spending more time and more amount than intended. Patient has made attempts to quit, is experiencing cravings, and reports negative consequences.                    Patient does have a history of seizures.  Patient does not have a history of delirium tremens.complains of having having visual hallucinations.  Seeing tracers also hearing  Muffled voices                     Denies thoughts of suicide or harming others.       Denies auditory or visual hallucinations.      Patient smokes 0 cigarettes     Patient denied any gambling     Substance Age first use First became regular or problematic Most recent use             Cannabis  none       Cocaine NONE       Stimulants NONE       Opioids NONE       Sedatives NONE       Hallucinogens NONE       Inhalants NONE       Other         OTC drugs NONE       Nicotine            Patient does not have a history of overdose.  Patient does not have a  history of IV use.  Patient does not have a history of hepatitis, HIV,  PSYCHIATRIC REVIEW OF SYSTEMS:          Psychiatric Review of Systems:   Depression:   Denied depressed mood, suicidal ideation, decreased interest, changes in sleep, changes in appetite, guilt, hopelessness, helplessness, impaired concentration, decreased energy, irritability.   Denies: depressed mood, suicidal ideation, decreased interest, changes in sleep, changes in appetite, guilt, hopelessness, helplessness, impaired concentration, decreased energy, irritability.  Susan:   denied sleeplessness, impulsiveness, racing thoughts, increased goal-directed activities, pressured speech, increase in energy  Susna Feeling euphoric,Distractible,Impulsive,Grandiose,Talking excessively,Have energy without sleeping,Mood swings,Irritability  Denies: sleeplessness, increased goal-directed activities, abrupt increase in energy, pressured speech  Psychosis:   Reports: visual hallucinations, auditory hallucinations in the context of withdrawl,  Anxiety: anxious feels its 1     Pt has following s/o of anxiety  No Shortness of breath,Rapid heart rate,no Sweaty,shakey Shortness of breath,Butterflies in the stomach,  , nausea    Denies: worries that are difficult to control for the past 6 months, panic attacks  PTSD:     Denies: re-experiencing past trauma, nightmares, increased arousal, avoidance of traumatic stimuli, impaired function.  OCD:     Denies: obsessions, checking, symmetry, cleaning, skin picking.  ED:     Denies: restriction, binging, purging.     Denied Symptoms of attention deficit disorder include a failure to pay attention to detail, a pattern of careless mistakes, a pattern of inattentive listening, a failure to follow through with projects, poor personal organization, losing necessary objects, distractibility, forgetfulness.     deniedSymptoms of borderline personality disorder include a fear of abandonment, unstable self-image, impulsive  behavior, dissociative feeling, intense anger, unstable personal relationships, chronic feelings of boredom, periods of intense depressed mood.                  PSYCHIATRIC HISTORY                     Past court commitments: none  SIB /SUICIDE ATTEMPTS NONE  Psych Hosp :none  Outpatient Programs none  Inpatient cd trt 13   Out pt cd trt none           SOCIAL HISTORY                                                                         Lives in a sober house, unemployed           Family History:   FAMILY HISTORY:             Family History   Problem Relation Age of Onset     Polycystic Kidney Diease Mother       Substance Abuse Mother       Depression Mother       Heart Defect Father       Heart Defect Brother        Family Mental Health History-  Mother has depression     Substance Use Problems - present for mother , mgf        Medical h/o   A 10-point review of systems is reviewed and is negative except for psychiatric symptoms above.       Allergies reviewed  Blood pressure (!) 143/95, pulse 101, temperature 97.7  F (36.5  C), temperature source Temporal, resp. rate 16, height 1.829 m (6'), weight 72.6 kg (160 lb), SpO2 98 %.    vitals  Appearance:  awake, alert, appeared as age stated, adequate groomed and slightly unkempt  Attitude:  cooperative  Eye Contact:  good  Mood:  anxious   Affect:  congruent   Speech:  clear, coherent normal rate   Psychomotor Behavior:  no evidence of tardive dyskinesia, dystonia, or tics  Thought Process:  logical, linear and goal oriented  Associations:  no loose associations  Thought Content:  no evidence of psychotic thought and active suicidal ideation present  Denied any active suicidal /homicidation ideation plan intent   Insight:  fair  Judgment:  fair  Oriented to:  time, person, and place  Attention Span and Concentration:  intact  Recent and Remote Memory:  intact  Language:  english with appropriate syntax and vocabulary  Fund of Knowledge: appropriate  Muscle  Strength and Tone: normal  Gait and Station: Normal    Assessment:         Patient has a biological predisposition with family history positive for alcoholism  Psychologically patient is experiencing alcohol withdrawal patient was endorsing rated visual hallucinations patient has these particular stressors relapse prone relationships job  Patient has chronic illness exacerbation leading to hospitalization progression as described.     Patient has been unable to stop using drugs in the community due to both physical and psychological symptoms.  Continued use will put the patient at risk for medical and/or psychiatric complications.        Inpatient psychiatric hospitalization is warranted at this time for safety, stabilization, and possible adjustment in medications.             Patient has severe exacerbation of chronic alcoholism  ,  been unable to stop using  in the community due to both physical and psychological symptoms.  Continued use will put the patient at risk for medical and/or psychiatric complications.      dx    Diagnosis  Alcohol use disorder severe  Alcohol withdrawal severe    Plan  Alcohol use disorder severe alcohol withdrawal severe  Patient has pulse of 101 elevated blood pressure 143/95 patient has tremor sleep disturbance hallucinations agitation sweats  He scored 11  He is received 2 mg of lorazepam since morning  Since his admission is received 4 mg of lorazepam and 35 mg of Valium    He has a complicated alcohol withdrawal including history of DTs and seizures  We will switch him to Valium if his withdrawal becomes worse he has elevated liver enzymes this is the reason I am not switching him at this time    Elevated liver enzymes   most likely from alcoholism nonviral suspected  We will repeat liver enzymes today  Elevated  most likely from alcoholism    TSH is low 0.13 T4 is normal 0.98 we will put internal medicine consult  RBC is low 4.30 hematocrit is low at 38.7 we  will put internal medicine consult    Discussed in detail about patient's smoking patient was advised to quit patient was told about the impact of smoking.  Patient's willingness to quit was assessed.  I provided methods and skills for cessation including medication management nicotine gum patch.  Patient did not set a quit date.  Patient is interested in quitting .we discussed pharmacotherapy options .patient agreed to take nicotine gum patch lozenge.  We discussed behavioral change techniques when craving nicotine including deep breathing drinking glass of water, taking a walk.    I HAVE REVIEWED LABS WITH PT AND TALKED ABOUT RESULTS WITH PT  I HAVE REVIEWED AND SUMMARIZED OLD RECORDS including his medication reconcilation of his home medications  and PDMP   I HAVE SPOKEN WITH RN ABOUT MEDICATIONS AND withdrawl SCORES  I HAVE SPOKEN WITH CM ABOUT PTS TREATMETN OPTIONS       Length of stay 3 to 5 days  Patient was in by case management internal medicine

## 2021-12-16 NOTE — PROGRESS NOTES
PDMP as of 12/16/2021:     Paulo Franco, 46M  Refine Search  Contact the [employer]  YOB: 1975  Recent Address:  View Linked Records (2)  Other Tools/Metrics  [facility]  Report generated on 12/16/2021. Report Date Range: 12/16/2020 - 12/16/2021  Prather  Narx Scores  Narcotic  000  Sedative  000  Stimulant  000  Explanation and Guidance  Overdose Risk Score  000  (Range 000-999)  Explanation and Guidance  State Indicators (0)  Details  RX Graph   Narcotic   Buprenorphine   Sedative   Stimulant   Other  Learn how to use graph  All Prescribers  Prescribers  2 - Yasir Gonzales  1 - Donaldo GALLARDO Qadr  Timeline  12/16  2m  6m  1y  2y  Disclaimer  Morphine Milligram Equivalent Prescribed Over Time  Last 30 Days  Last 60 Days  Last 90 Days  Last 1 Year  Last 2 Years  MME  Timeframe  0  1  2  11/17/21 12/2/21 12/16/21  0  MME per Day Avg.  0  MME per RX  Disclaimer  Lorazepam MgEq (LME) Prescribed Over Time  Last 30 Days  Last 60 Days  Last 90 Days  Last 1 Year  Last 2 Years  LME  Timeframe  0  1  2  11/17/21 12/2/21 12/16/21  0  LME Per Day Avg.  0  LME mg Per Rx  Disclaimer  Buprenorphine (mg) Prescribed Over Time  Last 30 Days  Last 60 Days  Last 90 Days  Last 1 Year  Last 2 Years  mg  Timeframe  0  1  2  11/17/21 12/2/21 12/16/21  0  mg Per Day Avg.  0  Avg mg Per Rx  Disclaimer  RX Summary  Summary  Total Prescriptions 3  Total Private Pay 0  Total Prescribers 2  Total Pharmacies 2  Opioids* (excluding Buprenorphine)  Current Qty 0  Current MME/day 0.00  30 Day Avg MME/day 0.00  Buprenorphine*  Current Qty 0  Current mg/day 0.00  30 Day Avg mg/day 0.00  RX Summary Expanded  Narcotics (excluding Buprenorphine)  30 Day Avg. MME 0.00  90 Day Avg. MME 0.00  Rx Count/12 Months 0  Prescriber #/6 Months 0  Pharmacy #/6 Months 0  Current Quantity 0  Buprenorphine  30 Day Avg. mg/day 0.00  90 Day Avg. mg/day 0.00  Rx Count/12 Months 0  Prescriber #/6 Months 0  Pharmacy #/6  Months 0  Current Quantity 0  Sedatives  30 Day Avg. LME 0.00  90 Day Avg. LME 0.00  Rx Count/12 Months 0  Prescriber #/6 Months 0  Pharmacy #/6 Months 0  Current Quantity 0  Stimulants  30 Day Avg. mg/day 0.00  90 Day Avg. mg/day 0.00  Rx Count/12 Months 0  Prescriber #/6 Months 0  Pharmacy #/6 Months 0  Current Quantity 0  Prescriptions  Total: 3  Private Pay: 0  Showing 1-3 of 3 Items View 15 Items  1 of 1   Filled  Written  Sold  ID  Drug  QTY  Days  Prescriber  RX #  Dispenser  Refill  Daily Dose*  Pymt Type     10/08/2021 09/20/2021 10/11/2021 2   Gabapentin 300 Mg Capsule  30.00 10 Sr Henrry [id number] Jewel ([id number]) 1/1  Medicaid [place]  09/20/2021 09/20/2021 09/21/2021 2   Gabapentin 300 Mg Capsule  30.00 10 Sr Henrry [id number] Jewel ([id number]) 0/1  Medicaid [place]  05/19/2021 05/19/2021 05/19/2021 1   Gabapentin 300 Mg Capsule  6.00 4 Gh Qad [id number] All ([id number]) 0/0  Comm Ins [place]  Disclaimer  Showing 1-3 of 3 Items View 15 Items  1 of 1   Providers  Total: 2  Showing 1-2 of 2 Items View 15 Items  1 of 1   Name  Address  City  State  Zipcode  Phone   Donaldo Trevinodri [address] [place] [address] [facility] [phone]  Sreejaya Veluvali [address] [facility] [address] [phone]  Showing 1-2 of 2 Items View 15 Items  1 of 1   Pharmacies  Total: 2  Showing 1-2 of 2 Items View 15 Items  1 of 1   Name  Address  City  State  Zipcode  Phone   [facility] [phone] [place] [address] [facility] [phone]  [facility] [phone] [address] [facility] [phone]  Showing 1-2 of 2 Items View 15 Items  1 of 1   The report provided is based upon the search criteria entered and the corresponding data as it has been reported by dispenser(s). If erroneous information is identified or additional information is needed, please contact the dispenser or the prescriber provided on the report. Date Sold signifies the date the prescription was sold (left the  pharmacy). The absence of Date Sold does not necessarily indicate the prescription was not dispensed. Fill Date represents the date the medication was filled or prepared by the pharmacy. Note, federal regulation (CFR Title 42: Part 2) requires patient consent prior to releasing certain patient data from federally funded opioid treatment programs (OTPs). As such, controlled substances dispensed from OTPs for medication-assisted treatment may not appear in the [place]  report. Morphine milligram equivalent (MME) conversion factors published by the CDC are used in the MME calculation. Per the CDC, the MME conversion factor is intended only for analytic purposes where prescription data are used to retrospectively calculate daily MME to inform analyses of risks associated with opioid prescribing. This value does not constitute clinical guidance or recommendations for converting patients from one form of opioid analgesic to another. Per the CDC, the conversion factors for drugs prescribed or provided, as part of medication-assisted treatment for opioid use disorder should not be used to benchmark against MME dosage thresholds meant for opioids prescribed for pain. Buprenorphine products listed in the CDC s MME file do not have an associated conversion factor. Lastly, the CDC notes, in clinical practice, calculating MME for methadone often involves a sliding-scale approach, whereby the conversion factor increases with increasing dose. The conversion factor of 3 for methadone presented in this file could underestimate MME for a given patient. This report contains confidential information, including patient identifiers, and is not a public record. The information on this report must be treated as protected health information and is only to be disclosed to others as authorized by applicable state and Federal regulations.

## 2021-12-16 NOTE — CONSULTS
TELEPSYCHIATRY TELEPHONE ADMISSION NOTE    Discussed with nurse Lazaro.  46-year-old male presented for alcohol detox.  Patient has been medically cleared.  PMH negative for significant medical illness.  NKDA.  History of withdrawal seizures.  Lab findings: COVID negative, transaminitis. UDS negative. Alcohol level: 0.323.     PLAN:    --Disposition: Admit to detox.  Routine admission orders placed.  --Precautions: Seizure/withdrawal.  --Withdrawal Management: Monitoring and detox protocol for acute alcohol withdrawal.    --Medications: Hold.          Merritt Sheikh MD  Psychiatrist

## 2021-12-17 VITALS
SYSTOLIC BLOOD PRESSURE: 140 MMHG | WEIGHT: 160 LBS | OXYGEN SATURATION: 98 % | HEART RATE: 112 BPM | RESPIRATION RATE: 16 BRPM | HEIGHT: 72 IN | TEMPERATURE: 97.5 F | DIASTOLIC BLOOD PRESSURE: 103 MMHG | BODY MASS INDEX: 21.67 KG/M2

## 2021-12-17 PROCEDURE — 250N000013 HC RX MED GY IP 250 OP 250 PS 637: Performed by: PSYCHIATRY & NEUROLOGY

## 2021-12-17 PROCEDURE — 99239 HOSP IP/OBS DSCHRG MGMT >30: CPT | Performed by: PSYCHIATRY & NEUROLOGY

## 2021-12-17 RX ORDER — ACAMPROSATE CALCIUM 333 MG/1
666 TABLET, DELAYED RELEASE ORAL 3 TIMES DAILY
Qty: 180 TABLET | Refills: 1 | Status: ON HOLD | OUTPATIENT
Start: 2021-12-17 | End: 2022-06-23

## 2021-12-17 RX ORDER — PANTOPRAZOLE SODIUM 40 MG/1
40 TABLET, DELAYED RELEASE ORAL 2 TIMES DAILY
Qty: 60 TABLET | Refills: 0 | COMMUNITY
Start: 2021-12-17 | End: 2021-12-17

## 2021-12-17 RX ORDER — MULTIPLE VITAMINS W/ MINERALS TAB 9MG-400MCG
1 TAB ORAL DAILY
Qty: 30 TABLET | Refills: 0 | Status: ON HOLD | OUTPATIENT
Start: 2021-12-17 | End: 2022-06-23

## 2021-12-17 RX ORDER — PANTOPRAZOLE SODIUM 40 MG/1
40 TABLET, DELAYED RELEASE ORAL 2 TIMES DAILY
Qty: 60 TABLET | Refills: 0 | Status: SHIPPED | OUTPATIENT
Start: 2021-12-17 | End: 2021-12-17

## 2021-12-17 RX ORDER — TRAZODONE HYDROCHLORIDE 50 MG/1
50 TABLET, FILM COATED ORAL
Qty: 30 TABLET | Refills: 0 | Status: ON HOLD | OUTPATIENT
Start: 2021-12-17 | End: 2022-06-23

## 2021-12-17 RX ORDER — LANOLIN ALCOHOL/MO/W.PET/CERES
100 CREAM (GRAM) TOPICAL DAILY
Qty: 30 TABLET | Refills: 0 | Status: ON HOLD | OUTPATIENT
Start: 2021-12-17 | End: 2022-06-23

## 2021-12-17 RX ORDER — HYDROXYZINE HYDROCHLORIDE 25 MG/1
25 TABLET, FILM COATED ORAL EVERY 4 HOURS PRN
Qty: 30 TABLET | Refills: 0 | Status: ON HOLD | OUTPATIENT
Start: 2021-12-17 | End: 2022-06-23

## 2021-12-17 RX ORDER — PANTOPRAZOLE SODIUM 40 MG/1
40 TABLET, DELAYED RELEASE ORAL DAILY
Qty: 30 TABLET | Refills: 0 | Status: ON HOLD | OUTPATIENT
Start: 2021-12-17 | End: 2022-06-23

## 2021-12-17 RX ORDER — FOLIC ACID 1 MG/1
1 TABLET ORAL DAILY
Qty: 30 TABLET | Refills: 0 | Status: ON HOLD | OUTPATIENT
Start: 2021-12-17 | End: 2022-06-23

## 2021-12-17 RX ADMIN — PANTOPRAZOLE SODIUM 40 MG: 40 TABLET, DELAYED RELEASE ORAL at 08:13

## 2021-12-17 RX ADMIN — THIAMINE HCL TAB 100 MG 100 MG: 100 TAB at 08:13

## 2021-12-17 RX ADMIN — ACAMPROSATE CALCIUM 666 MG: 333 TABLET, DELAYED RELEASE ORAL at 08:13

## 2021-12-17 RX ADMIN — MULTIPLE VITAMINS W/ MINERALS TAB 1 TABLET: TAB at 08:13

## 2021-12-17 RX ADMIN — FOLIC ACID 1 MG: 1 TABLET ORAL at 08:13

## 2021-12-17 NOTE — DISCHARGE SUMMARY
Paulo Franco MRN# [id number]   Age: 46 year old YOB: 1975     Date of Admission:  12/15/2021  Date of Discharge:  12/17/2021  Admitting Physician:  Merritt Sheikh MD  Discharge Physician:  Yasir Gonzales MD      DISCHARGE  DX    Alcohol use disorder severe  -TransaminitisAST [id number]   -219 most likely from alcoholism nonviral suspect         Event Leading to Hospitalization:     See Admission note by admitting provider for patient encounter. for additional details.          Hospital Course:   PATIENT was admitted to Station 3Awith attending  under DR gonzales, please review the detailed admit note on    The patient was placed under status 15 (15 minute checks) to ensure patient safety.   MSSA protocol was initiated due to the patient's history of alcohol abuse and concern for withdrawal symptoms.  CBC, BMP and utox obtained.    All outpatient medications were continued    PATIENTdid participate in groups and was visible in the milieu.     The patient's symptoms of alcohol withdrawal improved.     Patients energy motivation , sleep appetite improved.  Pt completed detox . It was un eventful.      Discussed with patient medications for craving.  Spoke with patient about triggers coping skills relapse prevention.    CONSULTS DONE DURING PATIENTS HOSPITALIZATION.  Patient was seen by medicine on date 12/16/2021    This as per their medical consult    Referring Provider: Behavioral Health - Merritt Sheikh MD  Reason for Visit: General Medical Evaluation, EtOH dependence and withdrawal          Assessment and Recommendations:   Paulo Franco is a 46 year old year old man with a history of polysubstance us d/o, EtOH dependence, Hx of DT, Hx of EtOH-withdrawal seizure, esophagitis who is admitted to station 3A with EtOH dependence and withdrawal.  .        EtOH dependence and withdrawal   Hx of DT  Hx of withdrawal seizure (early 2020)  Polysubstance use d/o.   Drinks 1pint-1L EtOH/day,  last drink afternoon of 12/15/21. EtOH breath 0.323, urine tox screen negative.   - Management per psychiatry team.    - agree with B1, B21, MVI, folate replacement     Hx Esophagitis (resolved)  Hx of hematemesis (resolved)  Occurs in the setting of EtOH dependence  - Hospitalized in 6/2021 for esophagitis w/ hematemesis.   - protonix 40mg daily, continue on discharge     Chronic cough  Pt states occurs in the setting of lactose intolerance/dairy intake  - encourage avoidance of dairy/offending foods  - suspect PPI will be helpful for reflex and likely post-nasal drip     Medicine will sign off, please page with any additional concerns.                 Pt was seen by cm  As per recommendations from cm  Met with Pt for discharge planning.  Pt reports a plan of entering long term treatment at [facility] in [place], [place].  Pt reports he was in [facility] and decided he needed a longer term program.  [facility] counselor faxed assessment.  Pt signed ADILIA and this was faxed to [facility].  Will fax clinical information when available.  Pt reports he can return to [facility] to wait for bed at [facility] in needed.  Update:  Clinical faxed to program.  Writer will follow up on 12/17/21.           Labs:reviewed with patient       Recent Results (from the past 48 hour(s))   Alcohol breath test POCT    Collection Time: 12/15/21  3:10 PM   Result Value Ref Range    Alcohol Breath Test 0.323 (A) 0.00 - 0.01   Comprehensive metabolic panel    Collection Time: 12/15/21  4:16 PM   Result Value Ref Range    Sodium 137 133 - 144 mmol/L    Potassium 3.4 3.4 - 5.3 mmol/L    Chloride 99 94 - 109 mmol/L    Carbon Dioxide (CO2) 30 20 - 32 mmol/L    Anion Gap 8 3 - 14 mmol/L    Urea Nitrogen 12 7 - 30 mg/dL    Creatinine 0.72 0.66 - 1.25 mg/dL    Calcium 8.8 8.5 - 10.1 mg/dL    Glucose 136 (H) 70 - 99 mg/dL    Alkaline Phosphatase 87 40 - 150 U/L     (H) 0 - 45 U/L     (H) 0 - 70 U/L    Protein Total 8.0 6.8 - 8.8 g/dL    Albumin 3.7 3.4 -  5.0 g/dL    Bilirubin Total 0.3 0.2 - 1.3 mg/dL    GFR Estimate >90 >60 mL/min/1.73m2   Magnesium    Collection Time: 12/15/21  4:16 PM   Result Value Ref Range    Magnesium 2.5 (H) 1.6 - 2.3 mg/dL   CBC with platelets and differential    Collection Time: 12/15/21  4:16 PM   Result Value Ref Range    WBC Count 5.4 4.0 - 11.0 10e3/uL    RBC Count 4.30 (L) 4.40 - 5.90 10e6/uL    Hemoglobin 13.8 13.3 - 17.7 g/dL    Hematocrit 38.7 (L) 40.0 - 53.0 %    MCV 90 78 - 100 fL    MCH 32.1 26.5 - 33.0 pg    MCHC 35.7 31.5 - 36.5 g/dL    RDW 13.1 10.0 - 15.0 %    Platelet Count 218 150 - 450 10e3/uL    % Neutrophils 50 %    % Lymphocytes 40 %    % Monocytes 9 %    % Eosinophils 0 %    % Basophils 1 %    % Immature Granulocytes 0 %    NRBCs per 100 WBC 0 <1 /100    Absolute Neutrophils 2.7 1.6 - 8.3 10e3/uL    Absolute Lymphocytes 2.2 0.8 - 5.3 10e3/uL    Absolute Monocytes 0.5 0.0 - 1.3 10e3/uL    Absolute Eosinophils 0.0 0.0 - 0.7 10e3/uL    Absolute Basophils 0.0 0.0 - 0.2 10e3/uL    Absolute Immature Granulocytes 0.0 <=0.4 10e3/uL    Absolute NRBCs 0.0 10e3/uL   GGT    Collection Time: 12/15/21  4:16 PM   Result Value Ref Range     (H) 0 - 75 U/L   TSH with free T4 reflex    Collection Time: 12/15/21  4:16 PM   Result Value Ref Range    TSH 0.13 (L) 0.40 - 4.00 mU/L   T4 free    Collection Time: 12/15/21  4:16 PM   Result Value Ref Range    Free T4 0.98 0.76 - 1.46 ng/dL   Asymptomatic COVID-19 Virus (Coronavirus) by PCR Nasopharyngeal    Collection Time: 12/15/21  4:42 PM    Specimen: Nasopharyngeal; Swab   Result Value Ref Range    SARS CoV2 PCR Negative Negative, Testing sent to reference lab. Results will be returned via unsolicited result   Drug abuse screen 1 urine (ED)    Collection Time: 12/15/21  7:07 PM   Result Value Ref Range    Amphetamines Urine Screen Negative Screen Negative    Barbiturates Urine Screen Negative Screen Negative    Benzodiazepines Urine Screen Negative Screen Negative    Cannabinoids  Urine Screen Negative Screen Negative    Cocaine Urine Screen Negative Screen Negative    Opiates Urine Screen Negative Screen Negative   Hepatic panel    Collection Time: 12/16/21  1:43 PM   Result Value Ref Range    Bilirubin Total 0.4 0.2 - 1.3 mg/dL    Bilirubin Direct 0.1 0.0 - 0.2 mg/dL    Protein Total 6.6 (L) 6.8 - 8.8 g/dL    Albumin 3.0 (L) 3.4 - 5.0 g/dL    Alkaline Phosphatase 70 40 - 150 U/L     (H) 0 - 45 U/L     (H) 0 - 70 U/L         Recent Results (from the past 240 hour(s))   Alcohol breath test POCT    Collection Time: 12/10/21 12:09 AM   Result Value Ref Range    Alcohol Breath Test 0.296 (A) 0.00 - 0.01   Alcohol breath test POCT    Collection Time: 12/15/21  3:10 PM   Result Value Ref Range    Alcohol Breath Test 0.323 (A) 0.00 - 0.01   Comprehensive metabolic panel    Collection Time: 12/15/21  4:16 PM   Result Value Ref Range    Sodium 137 133 - 144 mmol/L    Potassium 3.4 3.4 - 5.3 mmol/L    Chloride 99 94 - 109 mmol/L    Carbon Dioxide (CO2) 30 20 - 32 mmol/L    Anion Gap 8 3 - 14 mmol/L    Urea Nitrogen 12 7 - 30 mg/dL    Creatinine 0.72 0.66 - 1.25 mg/dL    Calcium 8.8 8.5 - 10.1 mg/dL    Glucose 136 (H) 70 - 99 mg/dL    Alkaline Phosphatase 87 40 - 150 U/L     (H) 0 - 45 U/L     (H) 0 - 70 U/L    Protein Total 8.0 6.8 - 8.8 g/dL    Albumin 3.7 3.4 - 5.0 g/dL    Bilirubin Total 0.3 0.2 - 1.3 mg/dL    GFR Estimate >90 >60 mL/min/1.73m2   Magnesium    Collection Time: 12/15/21  4:16 PM   Result Value Ref Range    Magnesium 2.5 (H) 1.6 - 2.3 mg/dL   CBC with platelets and differential    Collection Time: 12/15/21  4:16 PM   Result Value Ref Range    WBC Count 5.4 4.0 - 11.0 10e3/uL    RBC Count 4.30 (L) 4.40 - 5.90 10e6/uL    Hemoglobin 13.8 13.3 - 17.7 g/dL    Hematocrit 38.7 (L) 40.0 - 53.0 %    MCV 90 78 - 100 fL    MCH 32.1 26.5 - 33.0 pg    MCHC 35.7 31.5 - 36.5 g/dL    RDW 13.1 10.0 - 15.0 %    Platelet Count 218 150 - 450 10e3/uL    % Neutrophils 50 %    %  Lymphocytes 40 %    % Monocytes 9 %    % Eosinophils 0 %    % Basophils 1 %    % Immature Granulocytes 0 %    NRBCs per 100 WBC 0 <1 /100    Absolute Neutrophils 2.7 1.6 - 8.3 10e3/uL    Absolute Lymphocytes 2.2 0.8 - 5.3 10e3/uL    Absolute Monocytes 0.5 0.0 - 1.3 10e3/uL    Absolute Eosinophils 0.0 0.0 - 0.7 10e3/uL    Absolute Basophils 0.0 0.0 - 0.2 10e3/uL    Absolute Immature Granulocytes 0.0 <=0.4 10e3/uL    Absolute NRBCs 0.0 10e3/uL   GGT    Collection Time: 12/15/21  4:16 PM   Result Value Ref Range     (H) 0 - 75 U/L   TSH with free T4 reflex    Collection Time: 12/15/21  4:16 PM   Result Value Ref Range    TSH 0.13 (L) 0.40 - 4.00 mU/L   T4 free    Collection Time: 12/15/21  4:16 PM   Result Value Ref Range    Free T4 0.98 0.76 - 1.46 ng/dL   Asymptomatic COVID-19 Virus (Coronavirus) by PCR Nasopharyngeal    Collection Time: 12/15/21  4:42 PM    Specimen: Nasopharyngeal; Swab   Result Value Ref Range    SARS CoV2 PCR Negative Negative, Testing sent to reference lab. Results will be returned via unsolicited result   Drug abuse screen 1 urine (ED)    Collection Time: 12/15/21  7:07 PM   Result Value Ref Range    Amphetamines Urine Screen Negative Screen Negative    Barbiturates Urine Screen Negative Screen Negative    Benzodiazepines Urine Screen Negative Screen Negative    Cannabinoids Urine Screen Negative Screen Negative    Cocaine Urine Screen Negative Screen Negative    Opiates Urine Screen Negative Screen Negative   Hepatic panel    Collection Time: 12/16/21  1:43 PM   Result Value Ref Range    Bilirubin Total 0.4 0.2 - 1.3 mg/dL    Bilirubin Direct 0.1 0.0 - 0.2 mg/dL    Protein Total 6.6 (L) 6.8 - 8.8 g/dL    Albumin 3.0 (L) 3.4 - 5.0 g/dL    Alkaline Phosphatase 70 40 - 150 U/L     (H) 0 - 45 U/L     (H) 0 - 70 U/L            Because this patient meets criteria for an Alcohol Use Disorder, I performed the following brief intervention on the date of this note:              1)  Expressed concern that the patient is drinking at unhealthy levels known to increase their risk of alcohol related problems              2) Gave feedback linking alcohol use and health, including personalized feedback explaining how alcohol use can interact with their medical and/or psychiatric problems, and with prescribed medications.              3) Advised patient to abstain.    PT counseled on nicotine cessation and nicotine replacement provided    Discussed with patient many issues of addiction,triggers, relapse, and establishing a solid recovery program.    DISCHARGE MENTAL STATUS EXAMINATION:  The patient is alert, oriented x3.  Good fund of knowledge.  Good use of language.  Recent and remote memory, language, fund of knowledge are all adequate.  Euthymic mood congruent affect  Speech normal rate/rhythm linear tp no loose asso,The patient does not have any active suicidal or homicidal ideation.  Does not have any auditory or visual hallucination.  Fair insight/judgment At this time, the patient was stable to be discharged.        Pt was not determined to not be a danger to himself or others. At the current time of discharge, the patient does not meet criteria for involuntary hospitalization. On the day of discharge, the patient reports that they do not have suicidal or homicidal ideation and would never hurt themselves or others. Steps taken to minimize risk include: assessing patient s behavior and thought process daily during hospital stay, discharging patient with adequate plan for follow up for mental and physical health and discussing safety plan of returning to the hospital should the patient ever have thoughts of harming themselves or others. Therefore, based on all available evidence including the factors cited above, the patient does not appear to be at imminent risk for self-harm, and is appropriate for outpatient level of care.     Educated about side effects/risk vs benefits /alternative  "including non treatment.Pt consented to be on medication.     .Total time spent on discharge summary more than 35 min  More than  20 min  planning, coordination of care, medication reconciliation and performance of physical exam on day of discharge.Care was coordinated with unit RN and unit therapist         Medication List      There are no discharge medications for this visit.          Disposition: Patient going back to sober house     Facts about COVID19 at www.cdc.gov/COVID19 and www.[place].gov/covid19     Keeping hands clean is one of the most important steps we can take to avoid getting sick and spreading germs to others.  Please wash your hands frequently and lather with soap for at least 20 seconds!     Medical Follow-Up:  Primary Provider:  [facility]  Shawn Hinton MD  Addiction Medicine  [phone]  Please follow up with primary MD  within 30 days for post hospitalization checkup.                  \"Much or all of the text in this note was generated through the use of Dragon Dictate voice to text software. Errors in spelling or words which appear to be out of contact are unintentional, may be present due having escaped editing\"     "

## 2021-12-17 NOTE — PROGRESS NOTES
Call received from [facility]. They believe Pt needs to complete a residential treatment program prior to entering their program.  They are will ing to consider Pt for step down treatment.  Pt informed and requesting referral to [facility] in [place].  Pt needs update to assessment and will sign ADILIA for referral.  Pt to follow up from home.

## 2021-12-17 NOTE — PLAN OF CARE
"  Problem: General Rehab Plan of Care  Goal: Therapeutic Recreation/Music Therapy Goal (Art Therapy)  Description: The patient and/or their representative will achieve their patient-specific goals related to the plan of care.  The patient-specific goals include:  Outcome: Improving     Art Therapy directive is to create two hand tracings symbolizing what patient is \"reaching for and wanting to let go of\" using art media of pts choice.  Goals of directive: to identify positive strengths and goals, emotional expression, assessing motivation for change.  Pt was an active participant, focused on task for the full duration of group. Pt finished artwork and briefly shared with group. Pt identified several positive goals.  Pts mood was calm, pleasant participant.          "

## 2021-12-17 NOTE — DISCHARGE INSTRUCTIONS
Behavioral Discharge Planning and Instructions  THANK YOU FOR CHOOSING THE [address]  [phone]    Summary: You were admitted to Station 3A on 12/15/21 for detoxification from alcohol.  A medical exam was performed that included lab work. You have met with a  and opted to pursue treatment at [facility].  Please take care and make your recovery a priority!    Major Treatments, Procedures and Findings:  You have withdrawn from alcohol using the [facility] protocol with Valium.  You have met with a  to develop a treatment plan for discharge.  You have had labs drawn and those results have been reviewed with you. Please take a copy of your lab work with you to your next primary care physician appointment.    Main Diagnosis: Per Dr. Yasir Gonzales MD;  303.90 (F10.20) Alcohol Use Disorder Severe    Recommendation:  Your clinical information has been faxed to:   [address] [address] [phone]  Please contact the program directly to coordinate your admission.    Disposition: Home      Primary Provider:  [facility]  Shawn Hinton MD  Addiction Medicine  [phone]  Please follow up with primary MD  within 30 days for post hospitalization checkup.      Symptoms to Report:  If you experience more anxiety, confusion, sleeplessness, deep sadness or thoughts of suicide, notify your treatment team or notify your primary care physician. IF ANY OF THE SYMPTOMS YOU ARE EXPERIENCING ARE A MEDICAL EMERGENCY CALL 911 IMMEDIATELY.     Lifestyle Adjustment: Adjust your lifestyle to get enough sleep, relaxation, exercise and  good nutrition. Continue to develop healthy coping skills to decrease stress and promote a sober living environment. Do not use alcohol, illegal drugs or addictive medications other than what is currently prescribed. AA, NA, and  Sponsor are excellent resources for support.     General Medication  "Instructions:   See your medication sheet(s) for instructions.   Take all medicines as directed.  Make no changes unless your doctor suggests them.       DISCHARGE RESOURCES:  Facts about COVID19 at www.cdc.gov/COVID19 and www.[place].gov/covid19    Keeping hands clean is one of the most important steps we can take to avoid getting sick and spreading germs to others.  Please wash your hands frequently and lather with soap for at least 20 seconds!    Recovery apps for your phone to locate current in person and zoom recovery meetings  [place] - meeting bernardino  AA  - meeting bernardino  Meeting guide - meeting bernardino  Quick NA meeting - meeting bernardino  [employer]- has various apps    Great Pod casts for nutrition and wellness  Listen on Apple Podcasts  Dishing Up Nutrition   [employer]   Nutrition       Understand the connection between what you eat and how you feel. Hosted by licensed nutritionists and dietitians from [employer] we share practical, real-life solutions for healthier living through nutrition.     Resources:   Resources for on line recovery meetings:  *due to covid-19 AA/NA meetings are being held online*  AA meetings can be found online; search for them at: http://aa-intergroup.org/directory.php  AA meetings via ZOOM for [place] area can be found online at: https://a[place].org/find-a-meeting/holiday-closings/  NA meetings via ZOOM for [place] area can be found online at: https://sites.[employer].com/view/[place]regionofnarcoticsanonymous/home?authuser=2  Www.[employer]  has online resources for meeting and recovery care including Podcast \"Let's Talk:Addiction & Recovery Podcasts  Www.Olacabs.org   -SMART Recovery - self management for addiction recovery:  www.smartGlobalPrint Systems.org    -Pathways ~ A Health Crisis Resource & Support Center: 823.909.5493.  -[facility] [place] [phone]   -[facility] Behavioral Intake 099-547-0384 or " 290.151.5053.  -Suicide Awareness Voices of Education (SAVE) (www.save.org): 196-090-GDGA (0079)  -National Suicide Prevention Line (www.mentalhealthmn.org): 374-531-TAHY (6662)  -National Rebecca on Mental Illness (www.[place].trevor.org): 576.366.9540 or 866-954-1170.  -Rnth8ajmk: text the word LIFE to 47281 for immediate support and crisis intervention  -Mental Health Consumer/Survivor Network of [place] (www.mhcsn.net): 949.791.6260 or 895-544-4644  -Mental Health Association of [place] (www.mentalhealth.org): 342.337.8426 or 155-791-0544     -Substance Abuse and Mental Health Services (www.samhsa.gov)  -Harm Reduction Coalition (www. Harmreduction.org)  -www.prescribetoprevent.org or http://prescribetoprevent.org/video  -Poison control 2-213-136-4339   **[place] Opioid Prevention Coalition: www.opioidcoalition.org    Sober Support Group Information:  AA/NA & Sponsor/Support  -Alcoholics Anonymous (www.alcoholics-anonymous.org): for local information 24 hours/day  -AA Intergroup service office in [place] (http://www.aastpaul.org/) 881.581.5007  -AA Intergroup service office in [facility]: 727.308.1677. (http://www.a[place].org/)  -Narcotics Anonymous (www.naminn[place].org) (106) 167-6318   **Sober Fun Activities: www.sober-activities.CoverMe.Talkito/cities//[facility] Recovery Connection (MRC)  [facility] connects people seeking recovery to resources that help foster and sustain long-term recovery.  Whether you are seeking resources for treatment, transportation, housing, job training, education, health care or other pathways to recovery, [facility] is a great place to start.    Phone: 569.145.1603.  www.[place].Assistance.net Inc (Great listing of all types of recovery and non-recovery related resources)    Any follow up concerns:  Nursing questions call the Unit 3A-[facility] [phone]  Medical Record call [phone]  Outpatient Behavioral Intake call [phone]  LP+ Wait List/Bed Availability call  [phone]    The entire treatment team has appreciated the opportunity to work with you.  We wish you the best in the future and with your lifelong recovery goals. Please bring this discharge folder with you to all follow up appointments.  It contains your lab results, diagnosis, medication list and discharge recommendations.    THANK YOU FOR CHOOSING THE [facility]

## 2021-12-17 NOTE — PROGRESS NOTES
Paulo slept throughout the night without any concerns or incidents. Scored 3 on MSSA. Will continue to monitor.

## 2021-12-17 NOTE — PLAN OF CARE
Pt continues to be monitored for alcohol withdrawal. MSSA = 5 and 3 this shift.  Pt symptoms are minimal. He is visible in the lounge. He is pleasant and cooperative. He denies SI/SIB/HI.  Discharge plan is still being formulated.  BP (!) 149/100   Pulse 88   Temp 97.7  F (36.5  C) (Temporal)   Resp 16   Ht 1.829 m (6')   Wt 72.6 kg (160 lb)   SpO2 98%   BMI 21.70 kg/m

## 2021-12-17 NOTE — PLAN OF CARE
Pt verbalized complete understanding of all discharge instructions. Pt discharged to home transported by self.
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
## 2021-12-17 NOTE — PROGRESS NOTES
Met with Pt and updated assessment.  Pt signed ADILIA and clinical was faxed to [facility].  Pt to follow up from the community.

## 2022-02-07 NOTE — ED PROVIDER NOTES
History     Chief Complaint   Patient presents with     Drug / Alcohol Assessment     HPI  Paulo Franco is a 46 year old male with history of alcohol abuse who presents seeking detox from alcohol. He reports that he drinks 1 liter of alcohol daily. He denies any other illicit drug use. He reports history of withdrawal seizures and possible DTs. He denies any suicidal or homicidal ideation. He denies any other chronic medical problems. He denies any known sick contacts or COVID 19 exposure. He denies any fever, cough, chest pain, shortness of breath, vomiting, diarrhea, abdominal pain, or other complaints. His last drink was shortly prior to arrival.       Allergies:  No Known Allergies    Problem List:    Patient Active Problem List    Diagnosis Date Noted     Alcohol withdrawal (H) 12/16/2021     Priority: Medium     Alcohol use disorder, severe, dependence (H) 10/15/2021     Priority: Medium     Acute alcoholic intoxication in alcoholism without complication (H) 08/24/2021     Priority: Medium     History of Lyme disease 08/02/2018     Priority: Medium        Past Medical History:    Past Medical History:   Diagnosis Date     Concussion      Substance abuse (H)        Past Surgical History:    Past Surgical History:   Procedure Laterality Date     HAND SURGERY Left        Family History:    Family History   Problem Relation Age of Onset     Polycystic Kidney Diease Mother      Substance Abuse Mother      Depression Mother      Heart Defect Father      Heart Defect Brother        Social History:  Marital Status:  Single [1]  Social History     Tobacco Use     Smoking status: Never Smoker     Smokeless tobacco: Never Used   Substance Use Topics     Alcohol use: Yes     Comment: 1 liters of Vodka per day, last drank 5 hours ago     Drug use: No        Medications:    acamprosate (CAMPRAL) 333 MG EC tablet  folic acid (FOLVITE) 1 MG tablet  hydrOXYzine (ATARAX) 25 MG tablet  multivitamin w/minerals (THERA-VIT-M)  tablet  pantoprazole (PROTONIX) 40 MG EC tablet  thiamine (B-1) 100 MG tablet  traZODone (DESYREL) 50 MG tablet          Review of Systems  Pertinent positives and negatives are documented in the HPI. All other systems reviewed and are negative.    Physical Exam   BP: 114/76  Pulse: (!) 139  Temp: 98.4  F (36.9  C)  Resp: 16  Height: 182.9 cm (6')  Weight: 72.6 kg (160 lb)  SpO2: 93 %  Sitting Orthostatic BP: 142/103  Sitting Orthostatic Pulse: 108 bpm      Physical Exam  Vitals reviewed.   Constitutional:       General: He is not in acute distress.     Appearance: He is well-developed.   HENT:      Head: Normocephalic and atraumatic.   Eyes:      Extraocular Movements: Extraocular movements intact.      Conjunctiva/sclera: Conjunctivae normal.      Pupils: Pupils are equal, round, and reactive to light.   Cardiovascular:      Rate and Rhythm: Normal rate and regular rhythm.      Pulses: Normal pulses.      Heart sounds: Normal heart sounds. No murmur heard.      Pulmonary:      Effort: Pulmonary effort is normal. No respiratory distress.      Breath sounds: Normal breath sounds. No stridor. No wheezing or rales.   Abdominal:      General: Bowel sounds are normal. There is no distension.      Palpations: Abdomen is soft. There is no mass.      Tenderness: There is no abdominal tenderness. There is no guarding or rebound.   Musculoskeletal:         General: Normal range of motion.      Cervical back: Normal range of motion and neck supple.   Skin:     General: Skin is warm and dry.      Capillary Refill: Capillary refill takes less than 2 seconds.      Findings: No rash.   Neurological:      General: No focal deficit present.      Mental Status: He is alert and oriented to person, place, and time.      GCS: GCS eye subscore is 4. GCS verbal subscore is 5. GCS motor subscore is 6.      Cranial Nerves: No cranial nerve deficit.      Sensory: No sensory deficit.      Motor: No weakness or abnormal muscle tone.    Psychiatric:      Comments: intoxicated         ED Course          Procedures               The medical record was reviewed and interpreted.  Current labs reviewed and interpreted.           Labs Ordered and Resulted from Time of ED Arrival to Time of ED Departure   COMPREHENSIVE METABOLIC PANEL - Abnormal       Result Value    Sodium 137      Potassium 3.4      Chloride 99      Carbon Dioxide (CO2) 30      Anion Gap 8      Urea Nitrogen 12      Creatinine 0.72      Calcium 8.8      Glucose 136 (*)     Alkaline Phosphatase 87       (*)      (*)     Protein Total 8.0      Albumin 3.7      Bilirubin Total 0.3      GFR Estimate >90     MAGNESIUM - Abnormal    Magnesium 2.5 (*)    CBC WITH PLATELETS AND DIFFERENTIAL - Abnormal    WBC Count 5.4      RBC Count 4.30 (*)     Hemoglobin 13.8      Hematocrit 38.7 (*)     MCV 90      MCH 32.1      MCHC 35.7      RDW 13.1      Platelet Count 218      % Neutrophils 50      % Lymphocytes 40      % Monocytes 9      % Eosinophils 0      % Basophils 1      % Immature Granulocytes 0      NRBCs per 100 WBC 0      Absolute Neutrophils 2.7      Absolute Lymphocytes 2.2      Absolute Monocytes 0.5      Absolute Eosinophils 0.0      Absolute Basophils 0.0      Absolute Immature Granulocytes 0.0      Absolute NRBCs 0.0     ALCOHOL BREATH TEST POCT - Abnormal    Alcohol Breath Test 0.323 (*)    COVID-19 VIRUS (CORONAVIRUS) BY PCR - Normal    SARS CoV2 PCR Negative     DRUG ABUSE SCREEN 1 URINE (ED) - Normal    Amphetamines Urine Screen Negative      Barbiturates Urine Screen Negative      Benzodiazepines Urine Screen Negative      Cannabinoids Urine Screen Negative      Cocaine Urine Screen Negative      Opiates Urine Screen Negative           Medications   multivitamin, therapeutic (THERA-VIT) tablet 1 tablet (1 tablet Oral Given 12/15/21 1604)   folic acid (FOLVITE) tablet 1 mg (1 mg Oral Given 12/15/21 1604)   thiamine (B-1) tablet 100 mg (100 mg Oral Given 12/15/21 1604)    0.9% sodium chloride BOLUS (0 mLs Intravenous Stopped 12/15/21 1643)   ondansetron (ZOFRAN) injection 4 mg (4 mg Intravenous Given 12/15/21 1634)   sodium chloride 0.9 % infusion (0 mLs  Stopped 12/15/21 1750)       Assessments & Plan (with Medical Decision Making)    Patient presents requesting detox from alcohol. He denies any medical complaints. He is intoxicated to 0.323 at this time. He however is ambulating independently. He reports history of seizures and was placed on MSSA protocol. He was given oral thiamine, folic acid, and multivitamin. He was also given IV fluids due to tachycardia which improved. He was also given zofran for nausea later in his course. Labs were obtained. His transaminases are elevated likely related to alcohol use. He has no abdominal pain or tenderness. Bilirubin and Alk phos within normal limits. Remainder of labs unremarkable. He is medically stable for detox and voluntary. Detox has a bed available. He was admitted to the detox unit in stable condition.       I have reviewed the nursing notes.    I have reviewed the findings, diagnosis, plan and need for follow up with the patient.        Final diagnoses:   Alcohol use disorder, severe, dependence (H)   Alcohol withdrawal syndrome without complication (H)       12/15/2021   [facility] MENTAL HEALTH & ADDI[facility]     Nayeli Grace MD  02/07/22 0018

## 2022-06-21 ENCOUNTER — HOSPITAL ENCOUNTER (INPATIENT)
Facility: CLINIC | Age: 47
LOS: 2 days | Discharge: HOME | End: 2022-06-23
Attending: EMERGENCY MEDICINE | Admitting: PSYCHIATRY & NEUROLOGY
Payer: COMMERCIAL

## 2022-06-21 ENCOUNTER — TELEPHONE (OUTPATIENT)
Dept: BEHAVIORAL HEALTH | Facility: CLINIC | Age: 47
End: 2022-06-21

## 2022-06-21 DIAGNOSIS — F10.20 ALCOHOL USE DISORDER, SEVERE, DEPENDENCE (H): ICD-10-CM

## 2022-06-21 DIAGNOSIS — F10.120 ALCOHOL ABUSE WITH UNCOMPLICATED INTOXICATION (H): ICD-10-CM

## 2022-06-21 DIAGNOSIS — F10.10 ALCOHOL ABUSE: ICD-10-CM

## 2022-06-21 DIAGNOSIS — F32.9 CURRENT EPISODE OF MAJOR DEPRESSIVE DISORDER WITHOUT PRIOR EPISODE, UNSPECIFIED DEPRESSION EPISODE SEVERITY: ICD-10-CM

## 2022-06-21 DIAGNOSIS — Z11.52 ENCOUNTER FOR SCREENING LABORATORY TESTING FOR SEVERE ACUTE RESPIRATORY SYNDROME CORONAVIRUS 2 (SARS-COV-2): ICD-10-CM

## 2022-06-21 DIAGNOSIS — F10.930 ALCOHOL WITHDRAWAL SYNDROME WITHOUT COMPLICATION (H): ICD-10-CM

## 2022-06-21 DIAGNOSIS — R45.851 SUICIDAL IDEATION: ICD-10-CM

## 2022-06-21 LAB
ALBUMIN SERPL-MCNC: 3.8 G/DL (ref 3.4–5)
ALCOHOL BREATH TEST: 0.2 (ref 0–0.01)
ALP SERPL-CCNC: 92 U/L (ref 40–150)
ALT SERPL W P-5'-P-CCNC: 40 U/L (ref 0–70)
AMPHETAMINES UR QL SCN: ABNORMAL
ANION GAP SERPL CALCULATED.3IONS-SCNC: 14 MMOL/L (ref 3–14)
AST SERPL W P-5'-P-CCNC: 60 U/L (ref 0–45)
BARBITURATES UR QL: ABNORMAL
BASOPHILS # BLD AUTO: 0 10E3/UL (ref 0–0.2)
BASOPHILS NFR BLD AUTO: 0 %
BENZODIAZ UR QL: ABNORMAL
BILIRUB SERPL-MCNC: 0.9 MG/DL (ref 0.2–1.3)
BUN SERPL-MCNC: 15 MG/DL (ref 7–30)
CALCIUM SERPL-MCNC: 8.4 MG/DL (ref 8.5–10.1)
CANNABINOIDS UR QL SCN: ABNORMAL
CHLORIDE BLD-SCNC: 100 MMOL/L (ref 94–109)
CO2 SERPL-SCNC: 25 MMOL/L (ref 20–32)
COCAINE UR QL: ABNORMAL
CREAT SERPL-MCNC: 0.69 MG/DL (ref 0.66–1.25)
EOSINOPHIL # BLD AUTO: 0 10E3/UL (ref 0–0.7)
EOSINOPHIL NFR BLD AUTO: 0 %
ERYTHROCYTE [DISTWIDTH] IN BLOOD BY AUTOMATED COUNT: 13 % (ref 10–15)
GFR SERPL CREATININE-BSD FRML MDRD: >90 ML/MIN/1.73M2
GLUCOSE BLD-MCNC: 139 MG/DL (ref 70–99)
HCT VFR BLD AUTO: 40.1 % (ref 40–53)
HGB BLD-MCNC: 14.6 G/DL (ref 13.3–17.7)
IMM GRANULOCYTES # BLD: 0 10E3/UL
IMM GRANULOCYTES NFR BLD: 0 %
LYMPHOCYTES # BLD AUTO: 2 10E3/UL (ref 0.8–5.3)
LYMPHOCYTES NFR BLD AUTO: 21 %
MCH RBC QN AUTO: 32.7 PG (ref 26.5–33)
MCHC RBC AUTO-ENTMCNC: 36.4 G/DL (ref 31.5–36.5)
MCV RBC AUTO: 90 FL (ref 78–100)
MONOCYTES # BLD AUTO: 0.5 10E3/UL (ref 0–1.3)
MONOCYTES NFR BLD AUTO: 5 %
NEUTROPHILS # BLD AUTO: 6.9 10E3/UL (ref 1.6–8.3)
NEUTROPHILS NFR BLD AUTO: 74 %
NRBC # BLD AUTO: 0 10E3/UL
NRBC BLD AUTO-RTO: 0 /100
OPIATES UR QL SCN: ABNORMAL
PLATELET # BLD AUTO: 236 10E3/UL (ref 150–450)
POTASSIUM BLD-SCNC: 3.6 MMOL/L (ref 3.4–5.3)
PROT SERPL-MCNC: 7.9 G/DL (ref 6.8–8.8)
RBC # BLD AUTO: 4.46 10E6/UL (ref 4.4–5.9)
SARS-COV-2 RNA RESP QL NAA+PROBE: NEGATIVE
SODIUM SERPL-SCNC: 139 MMOL/L (ref 133–144)
WBC # BLD AUTO: 9.5 10E3/UL (ref 4–11)

## 2022-06-21 PROCEDURE — H2032 ACTIVITY THERAPY, PER 15 MIN: HCPCS

## 2022-06-21 PROCEDURE — 36415 COLL VENOUS BLD VENIPUNCTURE: CPT | Performed by: EMERGENCY MEDICINE

## 2022-06-21 PROCEDURE — 250N000013 HC RX MED GY IP 250 OP 250 PS 637: Performed by: EMERGENCY MEDICINE

## 2022-06-21 PROCEDURE — 250N000013 HC RX MED GY IP 250 OP 250 PS 637: Performed by: PSYCHIATRY & NEUROLOGY

## 2022-06-21 PROCEDURE — 80053 COMPREHEN METABOLIC PANEL: CPT | Performed by: EMERGENCY MEDICINE

## 2022-06-21 PROCEDURE — 85014 HEMATOCRIT: CPT | Performed by: EMERGENCY MEDICINE

## 2022-06-21 PROCEDURE — 82075 ASSAY OF BREATH ETHANOL: CPT | Performed by: EMERGENCY MEDICINE

## 2022-06-21 PROCEDURE — U0005 INFEC AGEN DETEC AMPLI PROBE: HCPCS | Performed by: EMERGENCY MEDICINE

## 2022-06-21 PROCEDURE — C9803 HOPD COVID-19 SPEC COLLECT: HCPCS | Performed by: EMERGENCY MEDICINE

## 2022-06-21 PROCEDURE — 80307 DRUG TEST PRSMV CHEM ANLYZR: CPT | Performed by: EMERGENCY MEDICINE

## 2022-06-21 PROCEDURE — HZ2ZZZZ DETOXIFICATION SERVICES FOR SUBSTANCE ABUSE TREATMENT: ICD-10-PCS | Performed by: PSYCHIATRY & NEUROLOGY

## 2022-06-21 PROCEDURE — 99285 EMERGENCY DEPT VISIT HI MDM: CPT | Mod: 25 | Performed by: EMERGENCY MEDICINE

## 2022-06-21 PROCEDURE — 90791 PSYCH DIAGNOSTIC EVALUATION: CPT

## 2022-06-21 PROCEDURE — 82040 ASSAY OF SERUM ALBUMIN: CPT | Performed by: EMERGENCY MEDICINE

## 2022-06-21 PROCEDURE — 99284 EMERGENCY DEPT VISIT MOD MDM: CPT | Performed by: EMERGENCY MEDICINE

## 2022-06-21 PROCEDURE — 128N000004 HC R&B CD ADULT

## 2022-06-21 RX ORDER — MAGNESIUM HYDROXIDE/ALUMINUM HYDROXICE/SIMETHICONE 120; 1200; 1200 MG/30ML; MG/30ML; MG/30ML
30 SUSPENSION ORAL EVERY 4 HOURS PRN
Status: DISCONTINUED | OUTPATIENT
Start: 2022-06-21 | End: 2022-06-23 | Stop reason: HOSPADM

## 2022-06-21 RX ORDER — FOLIC ACID 1 MG/1
1 TABLET ORAL DAILY
Status: DISCONTINUED | OUTPATIENT
Start: 2022-06-22 | End: 2022-06-23 | Stop reason: HOSPADM

## 2022-06-21 RX ORDER — ATENOLOL 50 MG/1
50 TABLET ORAL DAILY PRN
Status: DISCONTINUED | OUTPATIENT
Start: 2022-06-21 | End: 2022-06-21

## 2022-06-21 RX ORDER — ACETAMINOPHEN 325 MG/1
650 TABLET ORAL EVERY 4 HOURS PRN
Status: DISCONTINUED | OUTPATIENT
Start: 2022-06-21 | End: 2022-06-23 | Stop reason: HOSPADM

## 2022-06-21 RX ORDER — FOLIC ACID 1 MG/1
1 TABLET ORAL DAILY
Status: DISCONTINUED | OUTPATIENT
Start: 2022-06-21 | End: 2022-06-21

## 2022-06-21 RX ORDER — TRAZODONE HYDROCHLORIDE 50 MG/1
50 TABLET, FILM COATED ORAL
Status: DISCONTINUED | OUTPATIENT
Start: 2022-06-21 | End: 2022-06-23 | Stop reason: HOSPADM

## 2022-06-21 RX ORDER — MULTIPLE VITAMINS W/ MINERALS TAB 9MG-400MCG
1 TAB ORAL DAILY
Status: DISCONTINUED | OUTPATIENT
Start: 2022-06-22 | End: 2022-06-23 | Stop reason: HOSPADM

## 2022-06-21 RX ORDER — DIAZEPAM 5 MG
5-20 TABLET ORAL EVERY 30 MIN PRN
Status: DISCONTINUED | OUTPATIENT
Start: 2022-06-21 | End: 2022-06-23 | Stop reason: HOSPADM

## 2022-06-21 RX ORDER — DIAZEPAM 5 MG
5-20 TABLET ORAL EVERY 30 MIN PRN
Status: DISCONTINUED | OUTPATIENT
Start: 2022-06-21 | End: 2022-06-21

## 2022-06-21 RX ORDER — ATENOLOL 50 MG/1
50 TABLET ORAL DAILY PRN
Status: DISCONTINUED | OUTPATIENT
Start: 2022-06-21 | End: 2022-06-23 | Stop reason: HOSPADM

## 2022-06-21 RX ORDER — PANTOPRAZOLE SODIUM 40 MG/1
40 TABLET, DELAYED RELEASE ORAL DAILY
Status: DISCONTINUED | OUTPATIENT
Start: 2022-06-21 | End: 2022-06-23 | Stop reason: HOSPADM

## 2022-06-21 RX ORDER — MULTIPLE VITAMINS W/ MINERALS TAB 9MG-400MCG
1 TAB ORAL DAILY
Status: DISCONTINUED | OUTPATIENT
Start: 2022-06-21 | End: 2022-06-21

## 2022-06-21 RX ORDER — HYDROXYZINE HYDROCHLORIDE 25 MG/1
25 TABLET, FILM COATED ORAL EVERY 4 HOURS PRN
Status: DISCONTINUED | OUTPATIENT
Start: 2022-06-21 | End: 2022-06-23 | Stop reason: HOSPADM

## 2022-06-21 RX ADMIN — DIAZEPAM 10 MG: 5 TABLET ORAL at 06:20

## 2022-06-21 RX ADMIN — ATENOLOL 50 MG: 50 TABLET ORAL at 04:41

## 2022-06-21 RX ADMIN — DIAZEPAM 10 MG: 5 TABLET ORAL at 11:16

## 2022-06-21 RX ADMIN — DIAZEPAM 10 MG: 5 TABLET ORAL at 15:14

## 2022-06-21 RX ADMIN — DIAZEPAM 10 MG: 5 TABLET ORAL at 09:13

## 2022-06-21 RX ADMIN — DIAZEPAM 10 MG: 5 TABLET ORAL at 12:36

## 2022-06-21 RX ADMIN — DIAZEPAM 10 MG: 5 TABLET ORAL at 16:06

## 2022-06-21 RX ADMIN — MULTIPLE VITAMINS W/ MINERALS TAB 1 TABLET: TAB at 07:29

## 2022-06-21 RX ADMIN — FOLIC ACID 1 MG: 1 TABLET ORAL at 07:29

## 2022-06-21 RX ADMIN — DIAZEPAM 10 MG: 5 TABLET ORAL at 04:41

## 2022-06-21 RX ADMIN — DIAZEPAM 15 MG: 5 TABLET ORAL at 10:10

## 2022-06-21 RX ADMIN — DIAZEPAM 5 MG: 5 TABLET ORAL at 07:40

## 2022-06-21 RX ADMIN — PANTOPRAZOLE SODIUM 40 MG: 40 TABLET, DELAYED RELEASE ORAL at 16:06

## 2022-06-21 RX ADMIN — THIAMINE HCL TAB 100 MG 100 MG: 100 TAB at 07:29

## 2022-06-21 ASSESSMENT — LIFESTYLE VARIABLES
SKIP TO QUESTIONS 9-10: 0
AUDIT-C TOTAL SCORE: 12

## 2022-06-21 ASSESSMENT — ACTIVITIES OF DAILY LIVING (ADL)
WEAR_GLASSES_OR_BLIND: NO
LAUNDRY: WITH SUPERVISION
ORAL_HYGIENE: INDEPENDENT
DRESSING/BATHING_DIFFICULTY: NO
FALL_HISTORY_WITHIN_LAST_SIX_MONTHS: YES
NUMBER_OF_TIMES_PATIENT_HAS_FALLEN_WITHIN_LAST_SIX_MONTHS: 1
HEARING_DIFFICULTY_OR_DEAF: NO
DIFFICULTY_COMMUNICATING: NO
WALKING_OR_CLIMBING_STAIRS_DIFFICULTY: NO
HYGIENE/GROOMING: INDEPENDENT
TOILETING_ISSUES: NO
DRESS: SCRUBS (BEHAVIORAL HEALTH)
DIFFICULTY_EATING/SWALLOWING: NO
CONCENTRATING,_REMEMBERING_OR_MAKING_DECISIONS_DIFFICULTY: NO
DOING_ERRANDS_INDEPENDENTLY_DIFFICULTY: NO
CHANGE_IN_FUNCTIONAL_STATUS_SINCE_ONSET_OF_CURRENT_ILLNESS/INJURY: NO

## 2022-06-21 NOTE — ED PROVIDER NOTES
"ED Provider Note  [facility]      History     Chief Complaint   Patient presents with     Suicidal     With plan to hang self or jump from bridge.  Has relapsed on drinking alcohol and is feeling \"so terrible.\"     HPI  Paulo Franco is a 46 year old male with a PMH of alcohol abuse, alcohol withdrawal, Lyme disease and concussion who presents to the ED today reporting suicidal ideation.  Patient reports a longstanding history of alcohol abuse.  States he was previously sober for 6 months and then relapsed 2 to 3 weeks ago.  He drinks a liter of hard alcohol per day.  He is interested in \"getting clean\".  He states his alcohol use and living situation has caused some increased stress/depression.  He is feeling suicidal.  He has never felt like this before.  He states he spent a long time on the bridge today, contemplating jumping off.  He also notes that he loosened his shoelaces, but was not quite sure what he intended to do.  Says he is strongly contemplating suicide and wants to do it in the \"most feeling this way possible\".  He does state that he feels better in the ED and feels that he can keep himself safe while here.  Denies any additional substance abuse.  Denies any medical complaints.        Past Medical History  Past Medical History:   Diagnosis Date     Concussion      Substance abuse (H)     Done with treatment on 7/16/2018     Past Surgical History:   Procedure Laterality Date     HAND SURGERY Left      No current outpatient medications on file.    No Known Allergies  Family History  Family History   Problem Relation Age of Onset     Polycystic Kidney Diease Mother      Substance Abuse Mother      Depression Mother      Heart Defect Father      Heart Defect Brother      Social History   Social History     Tobacco Use     Smoking status: Never Smoker     Smokeless tobacco: Never Used   Substance Use Topics     Alcohol use: Yes     Comment: 1 liters of Vodka per day, last " drank 2 hours ago     Drug use: No      Past medical history, past surgical history, medications, allergies, family history, and social history were reviewed with the patient. No additional pertinent items.       Review of Systems   Constitutional: Negative for chills, fatigue and fever.   HENT: Negative for congestion and sore throat.    Eyes: Negative for pain and visual disturbance.   Respiratory: Negative for cough, chest tightness and shortness of breath.    Cardiovascular: Negative for chest pain and palpitations.   Gastrointestinal: Negative for abdominal distention, abdominal pain, constipation, diarrhea, nausea and vomiting.   Genitourinary: Negative for difficulty urinating, dysuria, frequency and urgency.   Musculoskeletal: Negative for arthralgias, back pain, myalgias and neck pain.   Skin: Negative for color change and rash.   Neurological: Negative for dizziness, weakness and headaches.   Psychiatric/Behavioral: Positive for dysphoric mood and suicidal ideas. Negative for agitation, confusion, hallucinations and self-injury. The patient is nervous/anxious.      A complete review of systems was performed with pertinent positives and negatives noted in the HPI, and all other systems negative.    Physical Exam   BP: 133/87  Pulse: (!) 123  Temp: 99.1  F (37.3  C)  Resp: 16  Height: 182.9 cm (6')  Weight: 77.1 kg (170 lb)  SpO2: 99 %  Physical Exam  Vitals and nursing note reviewed.   Constitutional:       General: He is not in acute distress.     Appearance: Normal appearance.      Comments: Disheveled appearance.  Leaves in hair.   HENT:      Head: Normocephalic.      Nose: Nose normal.   Eyes:      Pupils: Pupils are equal, round, and reactive to light.   Cardiovascular:      Rate and Rhythm: Normal rate and regular rhythm.   Pulmonary:      Effort: Pulmonary effort is normal.   Abdominal:      General: There is no distension.   Musculoskeletal:         General: No deformity. Normal range of motion.       Cervical back: Normal range of motion.   Skin:     General: Skin is warm.   Neurological:      Mental Status: He is alert and oriented to person, place, and time.   Psychiatric:         Mood and Affect: Mood is depressed.         Speech: Speech normal.         Behavior: Behavior is cooperative.         Thought Content: Thought content is not paranoid or delusional. Thought content includes suicidal ideation. Thought content includes suicidal plan. Thought content does not include homicidal plan.         Judgment: Judgment is impulsive.       ED Course     Mental Health Risk Assessment      PSS-3    Date and Time Over the past 2 weeks have you felt down, depressed, or hopeless? Over the past 2 weeks have you had thoughts of killing yourself? Have you ever attempted to kill yourself? When did this last happen? User   06/21/22 0300 yes yes no -- JN      C-SSRS ([place])    Date and Time Q1 Wished to be Dead (Past Month) Q2 Suicidal Thoughts (Past Month) Q3 Suicidal Thought Method Q4 Suicidal Intent without Specific Plan Q5 Suicide Intent with Specific Plan Q6 Suicide Behavior (Lifetime) Within the Past 3 Months? RETIRED: Level of Risk per Screen Screening Not Complete User   06/21/22 1500 no yes no no no no -- -- -- MKB   06/21/22 0758 yes yes no no no yes -- -- -- IH   06/21/22 0300 yes yes yes -- -- -- -- -- -- JN   06/21/22 0200 yes yes yes -- -- -- -- -- -- JN                     Results for orders placed or performed during the hospital encounter of 06/21/22   Comprehensive metabolic panel     Status: Abnormal   Result Value Ref Range    Sodium 139 133 - 144 mmol/L    Potassium 3.6 3.4 - 5.3 mmol/L    Chloride 100 94 - 109 mmol/L    Carbon Dioxide (CO2) 25 20 - 32 mmol/L    Anion Gap 14 3 - 14 mmol/L    Urea Nitrogen 15 7 - 30 mg/dL    Creatinine 0.69 0.66 - 1.25 mg/dL    Calcium 8.4 (L) 8.5 - 10.1 mg/dL    Glucose 139 (H) 70 - 99 mg/dL    Alkaline Phosphatase 92 40 - 150 U/L    AST 60 (H) 0 - 45 U/L    ALT 40 0  - 70 U/L    Protein Total 7.9 6.8 - 8.8 g/dL    Albumin 3.8 3.4 - 5.0 g/dL    Bilirubin Total 0.9 0.2 - 1.3 mg/dL    GFR Estimate >90 >60 mL/min/1.73m2   Asymptomatic COVID-19 Virus (Coronavirus) by PCR Nasopharyngeal     Status: Normal    Specimen: Nasopharyngeal; Swab   Result Value Ref Range    SARS CoV2 PCR Negative Negative    Narrative    Testing was performed using the valarie  SARS-CoV-2 & Influenza A/B Assay on the valarie  Miya  System.  This test should be ordered for the detection of SARS-COV-2 in individuals who meet SARS-CoV-2 clinical and/or epidemiological criteria. Test performance is unknown in asymptomatic patients.  This test is for in vitro diagnostic use under the FDA EUA for laboratories certified under CLIA to perform moderate and/or high complexity testing. This test has not been FDA cleared or approved.  A negative test does not rule out the presence of PCR inhibitors in the specimen or target RNA in concentration below the limit of detection for the assay. The possibility of a false negative should be considered if the patient's recent exposure or clinical presentation suggests COVID-19.  [facility] are certified under the Clinical Laboratory Improvement Amendments of 1988 (CLIA-88) as qualified to perform moderate and/or high complexity laboratory testing.   CBC with platelets and differential     Status: None   Result Value Ref Range    WBC Count 9.5 4.0 - 11.0 10e3/uL    RBC Count 4.46 4.40 - 5.90 10e6/uL    Hemoglobin 14.6 13.3 - 17.7 g/dL    Hematocrit 40.1 40.0 - 53.0 %    MCV 90 78 - 100 fL    MCH 32.7 26.5 - 33.0 pg    MCHC 36.4 31.5 - 36.5 g/dL    RDW 13.0 10.0 - 15.0 %    Platelet Count 236 150 - 450 10e3/uL    % Neutrophils 74 %    % Lymphocytes 21 %    % Monocytes 5 %    % Eosinophils 0 %    % Basophils 0 %    % Immature Granulocytes 0 %    NRBCs per 100 WBC 0 <1 /100    Absolute Neutrophils 6.9 1.6 - 8.3 10e3/uL    Absolute Lymphocytes 2.0 0.8 - 5.3 10e3/uL     Absolute Monocytes 0.5 0.0 - 1.3 10e3/uL    Absolute Eosinophils 0.0 0.0 - 0.7 10e3/uL    Absolute Basophils 0.0 0.0 - 0.2 10e3/uL    Absolute Immature Granulocytes 0.0 <=0.4 10e3/uL    Absolute NRBCs 0.0 10e3/uL   Drug abuse screen 1 urine (ED)     Status: Abnormal   Result Value Ref Range    Amphetamines Urine Screen Negative Screen Negative    Barbiturates Urine Screen Negative Screen Negative    Benzodiazepines Urine Screen Positive (A) Screen Negative    Cannabinoids Urine Screen Negative Screen Negative    Cocaine Urine Screen Negative Screen Negative    Opiates Urine Screen Negative Screen Negative   Alcohol breath test POCT     Status: Abnormal   Result Value Ref Range    Alcohol Breath Test 0.2 (A) 0.00 - 0.01   CBC with platelets differential     Status: None    Narrative    The following orders were created for panel order CBC with platelets differential.  Procedure                               Abnormality         Status                     ---------                               -----------         ------                     CBC with platelets and d...[[id number]]                      Final result                 Please view results for these tests on the individual orders.   Urine Drugs of Abuse Screen     Status: Abnormal    Narrative    The following orders were created for panel order Urine Drugs of Abuse Screen.  Procedure                               Abnormality         Status                     ---------                               -----------         ------                     Drug abuse screen 1 urin...[[id number]]  Abnormal            Final result                 Please view results for these tests on the individual orders.     Medications   pantoprazole (PROTONIX) EC tablet 40 mg (40 mg Oral Given 6/21/22 1606)   diazepam (VALIUM) tablet 5-20 mg (10 mg Oral Given 6/22/22 0037)   atenolol (TENORMIN) tablet 50 mg (has no administration in time range)   thiamine (B-1) tablet 100 mg (has no  administration in time range)   folic acid (FOLVITE) tablet 1 mg (has no administration in time range)   multivitamin w/minerals (THERA-VIT-M) tablet 1 tablet (has no administration in time range)   acetaminophen (TYLENOL) tablet 650 mg (has no administration in time range)   alum & mag hydroxide-simethicone (MAALOX) suspension 30 mL (has no administration in time range)   traZODone (DESYREL) tablet 50 mg (has no administration in time range)   hydrOXYzine (ATARAX) tablet 25 mg (has no administration in time range)        Assessments & Plan (with Medical Decision Making)   Presents to the ED for evaluation of alcohol intoxication and suicidal thoughts.  He has a plan to jump off a bridge or possibly strangle himself.    On arrival, blood alcohol 0.2.  Patient is calm and cooperative.  He is persistently suicidal.  Plan to board in the ED overnight.  Will need mental health assessment when sober.  Will sign out to morning provider to follow-up on mental health recommendations and disposition accordingly.        I have reviewed the nursing notes. I have reviewed the findings, diagnosis, plan and need for follow up with the patient.    Current Discharge Medication List          Final diagnoses:   Alcohol abuse       --  Jose Angel Plummer DO  [facility] EMERGENCY DEPARTMENT  6/21/2022     Jose Angel Plummer DO  06/22/22 0603

## 2022-06-21 NOTE — ED NOTES
"ED to Behavioral Floor Handoff    SITUATION  Paulo Franco is a 46 year old male who speaks English and lives is homeless alone The patient arrived in the ED by ambulance with a complaint of Suicidal (With plan to hang self or jump from bridge.  Has relapsed on drinking alcohol and is feeling \"so terrible.\")  .The patient's current symptoms started/worsened 1 day(s) ago and during this time the symptoms have increased.   In the ED, pt was diagnosed with   Final diagnoses:   Alcohol abuse        Initial vitals were: BP: 133/87  Pulse: (!) 123  Temp: 99.1  F (37.3  C)  Resp: 16  Weight: 77.1 kg (170 lb)  SpO2: 99 %   --------  Is the patient diabetic? No   If yes, last blood glucose? --     If yes, was this treated in the ED? --  --------  Is the patient inebriated (ETOH) Yes or Impaired on other substances? No  MSSA done? Yes  Last MSSA score: 17  Were withdrawal symptoms treated? Yes  Does the patient have a seizure history? Yes. If yes, date of most recent seizure--  unknown  Is the patient patient experiencing suicidal ideation? reports suicidal ideation with out intention or a suicidal plan    Homicidal ideation? denies current or recent homicidal ideation or behaviors.    Self-injurious behavior/urges? denies current or recent self injurious behavior or ideation.  ------  Was pt aggressive in the ED No  Was a code called No  Is the pt now cooperative? Yes  -------  Meds given in ED:   Medications   diazepam (VALIUM) tablet 5-20 mg (10 mg Oral Given 6/21/22 1236)   thiamine (B-1) tablet 100 mg (100 mg Oral Given 6/21/22 0729)   folic acid (FOLVITE) tablet 1 mg (1 mg Oral Given 6/21/22 0729)   multivitamin w/minerals (THERA-VIT-M) tablet 1 tablet (1 tablet Oral Given 6/21/22 0729)   atenolol (TENORMIN) tablet 50 mg (50 mg Oral Given 6/21/22 6321)      Family present during ED course? No  Family currently present? No    BACKGROUND  Does the patient have a cognitive impairment or developmental disability? " No  Allergies: No Known Allergies.   Social demographics are   Social History     Socioeconomic History     Marital status: Single     Spouse name: None     Number of children: None     Years of education: None     Highest education level: None   Tobacco Use     Smoking status: Never Smoker     Smokeless tobacco: Never Used   Substance and Sexual Activity     Alcohol use: Yes     Comment: 1 liters of Vodka per day, last drank 2 hours ago     Drug use: No     Sexual activity: Yes     Partners: Female     Birth control/protection: None        ASSESSMENT  Labs results   Labs Ordered and Resulted from Time of ED Arrival to Time of ED Departure   COMPREHENSIVE METABOLIC PANEL - Abnormal       Result Value    Sodium 139      Potassium 3.6      Chloride 100      Carbon Dioxide (CO2) 25      Anion Gap 14      Urea Nitrogen 15      Creatinine 0.69      Calcium 8.4 (*)     Glucose 139 (*)     Alkaline Phosphatase 92      AST 60 (*)     ALT 40      Protein Total 7.9      Albumin 3.8      Bilirubin Total 0.9      GFR Estimate >90     DRUG ABUSE SCREEN 1 URINE (ED) - Abnormal    Amphetamines Urine Screen Negative      Barbiturates Urine Screen Negative      Benzodiazepines Urine Screen Positive (*)     Cannabinoids Urine Screen Negative      Cocaine Urine Screen Negative      Opiates Urine Screen Negative     ALCOHOL BREATH TEST POCT - Abnormal    Alcohol Breath Test 0.2 (*)    COVID-19 VIRUS (CORONAVIRUS) BY PCR - Normal    SARS CoV2 PCR Negative     CBC WITH PLATELETS AND DIFFERENTIAL    WBC Count 9.5      RBC Count 4.46      Hemoglobin 14.6      Hematocrit 40.1      MCV 90      MCH 32.7      MCHC 36.4      RDW 13.0      Platelet Count 236      % Neutrophils 74      % Lymphocytes 21      % Monocytes 5      % Eosinophils 0      % Basophils 0      % Immature Granulocytes 0      NRBCs per 100 WBC 0      Absolute Neutrophils 6.9      Absolute Lymphocytes 2.0      Absolute Monocytes 0.5      Absolute Eosinophils 0.0      Absolute  Basophils 0.0      Absolute Immature Granulocytes 0.0      Absolute NRBCs 0.0        Imaging Studies: No results found for this or any previous visit (from the past 24 hour(s)).   Most recent vital signs /70   Pulse 82   Temp 97.8  F (36.6  C) (Oral)   Resp 16   Wt 77.1 kg (170 lb)   SpO2 96%   BMI 23.06 kg/m     Abnormal labs/tests/findings requiring intervention:---   Pain control: good  Nausea control: pt had none    RECOMMENDATION  Are any infection precautions needed (MRSA, VRE, etc.)? No If yes, what infection? --  ---  Does the patient have mobility issues? independently. If yes, what device does the pt use? ---  ---  Is patient on 72 hour hold or commitment? No If on 72 hour hold, have hold and rights been given to patient? N/A  Are admitting orders written if after 10 p.m. ?N/A  Tasks needing to be completed: none    Oliva Cardenas RN     [phone] [facility]   [phone] [facility]

## 2022-06-21 NOTE — PLAN OF CARE
"  Problem: Suicidal Behavior  Goal: Suicidal Behavior is Absent or Managed  Outcome: Ongoing, Not Progressing  Flowsheets (Taken 6/21/2022 5461)  Mutually Determined Action Steps (Suicidal Behavior Absent/Managed): verbalizes safety check rationale     Problem: Alcohol Withdrawal  Goal: Alcohol Withdrawal Symptom Control  Description: 1. Detoxification from Alcohol using the [facility] valium protocol  2. Patient will complete assessment paperwork  3.  Patient will meet with  to discuss treatment and discharge planning  4. Physical examination and Lab evaluation by MD  5. Patients oral intake will be greater than 75 % of meals to meet estimated needs  6. Adequate fluid intake    Outcome: Ongoing, Not Progressing   Goal Outcome Evaluation:    Plan of Care Reviewed With: patient       46 year old male admitted  from [facility] Ed for treatment and evaluation alcohol use and mental health stabilization.     Pt has been drinking one liter of vodka daily x 4 days when he said he walked away from his sober house [facility].   Pt states he last drank last night 1 pint of vodka.     He reports history of DT's and alcohol withdrawal seizures.     Pt says he is currently having auditory and visual hallucinations here on the unit and also while in ED.     Pt stated \"I'm hallucinating as we speak, auditory and visual, there are tracers in the air and the walls are fuzzy. \" States his feet and hands feel \"Like they are on pins and needles\"    Pt alert and orientated during admission process and did not appear to be responding to internal or external stimuli.       Pt had received a total of 70 mg of valium while in ED. No sedation noted.       Pt reports history of lymes disease and repair of left hand  fracture and pins in place.     He has been to several treatments including [facility] in October 2021 and then [facility] and [facility] [place].     Pt says he came down to [facility] due to probation issues (domestic assault " "charges) and has been working as a cook.     Pt says he is unsure where his car is and has been riding a bike and living on the streets x 4 days. Pt is unkempt.    He says he fell down last night but did not hit his head.   Does have a history of concussions.     Pt is interested in going to [facility] treatment program upon discharge.     Pt has not been taking any medications since he left  October 2021.     Pt endorsed suicidal thoughts stating he [place] coming to [place]  stating \"It was starting to look like jumping wasn't such a bad idea.\"    Pt denies SI upon admission. Contracts for safety on the unit.     Denies past MH admissions. Denies past SIB/SA.     Dx with ADHD.     Pt signed a ADILIA for his GF.     Plan: MSSA protocol with valium, suicide, withdrawal, falls precaution.   Obtained admission orders per Dr. Gonzales. Pt orientated to unit.                     "

## 2022-06-21 NOTE — ED TRIAGE NOTES
Pt. suicidal with plan to jump off bridge of hang self.       Triage Assessment     Row Name 06/21/22 0108       Triage Assessment (Adult)    Airway WDL WDL       Respiratory WDL    Respiratory WDL WDL       Skin Circulation/Temperature WDL    Skin Circulation/Temperature WDL WDL       Cardiac WDL    Cardiac WDL WDL       Peripheral/Neurovascular WDL    Peripheral Neurovascular WDL WDL       Cognitive/Neuro/Behavioral WDL    Cognitive/Neuro/Behavioral WDL WDL

## 2022-06-21 NOTE — CONSULTS
6/21/2022  Paulo Franco 1975                                                                                                                          TRIAGE AND TRANSITION CLINICAL CHART REVIEW    Patient location: [facility] at [facility] - so unable to interview at this moment.   Reason for coming to ED - was suicidal d/t relapse on alcohol. See ED note.    Patient chart was reviewed for relevant clinical information as follows:    history of polysubstance us d/o, EtOH dependence, Hx of DT, Hx of EtOH-withdrawal seizure, esophagitis     Multiple admissions to detox unit at [facility] recently - and elsewhere.   Seen at [facility] ed on 6/19 for alcohol use and altered mental status.   Problem list in Epic via [employer] includes past mood dx of unspecified anxiety disorder, ADHD, and substance induced mood disorder - in addition to substance use/alcohol dx's
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
## 2022-06-21 NOTE — PROGRESS NOTES
06/21/22 1434   Patient Belongings   Patient Belongings Put in Hospital Secure Location (Security or Locker, etc.) other (see comments)   Belongings Search Yes   Clothing Search Yes   Second Staff Remy, Psych Associate   Storage - Shoes, pants with string,  Med Room - wallet, keys, [place]. ID, Social Security card, insurance cards x2, EBT card, Go[employer] Card, [place] Debit/MasterCard, $7.00 Cash,   A               Admission:  I am responsible for any personal items that are not sent to the safe or pharmacy.  [facility] is not responsible for loss, theft or damage of any property in my possession.    Signature:  _________________________________ Date: _______  Time: _____                                              Staff Signature:  ____________________________ Date: ________  Time: _____      2nd Staff person, if patient is unable/unwilling to sign:    Signature: ________________________________ Date: ________  Time: _____     Discharge:  [facility] has returned all of my personal belongings:    Signature: _________________________________ Date: ________  Time: _____                                          Staff Signature:  ____________________________ Date: ________  Time: _____

## 2022-06-21 NOTE — ED NOTES
Pt signed out to me by Dr. Plummer at 7 am.       Situation: Patient is a 46-year-old male with a history of alcohol abuse who presented to the ER acutely intoxicated with complaints of suicidal thoughts.  Patient was seen and he was thinking of jumping from a bridge.  Patient had labs in the ER that are stable.  Patient's electrolytes CBC and COVID are all negative.    Plan:  -The patient to be evaluated by behavioral health assessment regarding his ongoing suicidal thoughts.    Shift Report:    Per DEC:  Started drinking last week; drinking 2 pints daily. Last drink yesterday at 8pm. Currently having withdrawal symptoms. Was feeling suicidal yesterday while he was drinking. Not homicidal, no meds. No other substances of abuse. Is wanting detox. Last admitted to detox in Oct. 2022. Pt is requesting a detox admission.     Will plan for a detox admission.     Signed:  Henrietta Caldwell MD  June 21, 2022 at 11:51 AM       Henrietta Caldwell MD  06/21/22 6525
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
## 2022-06-22 ENCOUNTER — APPOINTMENT (OUTPATIENT)
Dept: GENERAL RADIOLOGY | Facility: CLINIC | Age: 47
End: 2022-06-22
Attending: NURSE PRACTITIONER
Payer: COMMERCIAL

## 2022-06-22 LAB
CHOLEST SERPL-MCNC: 287 MG/DL
FOLATE SERPL-MCNC: 11.7 NG/ML (ref 4.6–34.8)
GGT SERPL-CCNC: 31 U/L (ref 0–75)
HDLC SERPL-MCNC: 67 MG/DL
LDLC SERPL CALC-MCNC: 188 MG/DL
NONHDLC SERPL-MCNC: 220 MG/DL
TRIGL SERPL-MCNC: 161 MG/DL
TSH SERPL DL<=0.005 MIU/L-ACNC: 1.91 MU/L (ref 0.4–4)
VIT B12 SERPL-MCNC: 649 PG/ML (ref 232–1245)

## 2022-06-22 PROCEDURE — 82746 ASSAY OF FOLIC ACID SERUM: CPT | Performed by: PSYCHIATRY & NEUROLOGY

## 2022-06-22 PROCEDURE — 82977 ASSAY OF GGT: CPT | Performed by: PSYCHIATRY & NEUROLOGY

## 2022-06-22 PROCEDURE — H2032 ACTIVITY THERAPY, PER 15 MIN: HCPCS

## 2022-06-22 PROCEDURE — 128N000004 HC R&B CD ADULT

## 2022-06-22 PROCEDURE — 73562 X-RAY EXAM OF KNEE 3: CPT | Mod: RT

## 2022-06-22 PROCEDURE — 73562 X-RAY EXAM OF KNEE 3: CPT | Mod: 26 | Performed by: RADIOLOGY

## 2022-06-22 PROCEDURE — 84443 ASSAY THYROID STIM HORMONE: CPT | Performed by: PSYCHIATRY & NEUROLOGY

## 2022-06-22 PROCEDURE — 99223 1ST HOSP IP/OBS HIGH 75: CPT | Mod: AI | Performed by: PSYCHIATRY & NEUROLOGY

## 2022-06-22 PROCEDURE — 36415 COLL VENOUS BLD VENIPUNCTURE: CPT | Performed by: PSYCHIATRY & NEUROLOGY

## 2022-06-22 PROCEDURE — 82607 VITAMIN B-12: CPT | Performed by: PSYCHIATRY & NEUROLOGY

## 2022-06-22 PROCEDURE — 250N000013 HC RX MED GY IP 250 OP 250 PS 637: Performed by: PSYCHIATRY & NEUROLOGY

## 2022-06-22 PROCEDURE — 99221 1ST HOSP IP/OBS SF/LOW 40: CPT | Performed by: NURSE PRACTITIONER

## 2022-06-22 PROCEDURE — 80061 LIPID PANEL: CPT | Performed by: PSYCHIATRY & NEUROLOGY

## 2022-06-22 RX ORDER — ACAMPROSATE CALCIUM 333 MG/1
666 TABLET, DELAYED RELEASE ORAL 3 TIMES DAILY
Status: DISCONTINUED | OUTPATIENT
Start: 2022-06-22 | End: 2022-06-23 | Stop reason: HOSPADM

## 2022-06-22 RX ADMIN — DIAZEPAM 10 MG: 5 TABLET ORAL at 12:37

## 2022-06-22 RX ADMIN — ATENOLOL 50 MG: 50 TABLET ORAL at 16:49

## 2022-06-22 RX ADMIN — TRAZODONE HYDROCHLORIDE 50 MG: 50 TABLET ORAL at 21:30

## 2022-06-22 RX ADMIN — ACAMPROSATE CALCIUM 666 MG: 333 TABLET, DELAYED RELEASE ORAL at 14:11

## 2022-06-22 RX ADMIN — DIAZEPAM 10 MG: 5 TABLET ORAL at 07:06

## 2022-06-22 RX ADMIN — DIAZEPAM 10 MG: 5 TABLET ORAL at 00:37

## 2022-06-22 RX ADMIN — FOLIC ACID 1 MG: 1 TABLET ORAL at 07:55

## 2022-06-22 RX ADMIN — MULTIPLE VITAMINS W/ MINERALS TAB 1 TABLET: TAB at 07:55

## 2022-06-22 RX ADMIN — PANTOPRAZOLE SODIUM 40 MG: 40 TABLET, DELAYED RELEASE ORAL at 07:55

## 2022-06-22 RX ADMIN — ACAMPROSATE CALCIUM 666 MG: 333 TABLET, DELAYED RELEASE ORAL at 20:29

## 2022-06-22 RX ADMIN — THIAMINE HCL TAB 100 MG 100 MG: 100 TAB at 07:55

## 2022-06-22 ASSESSMENT — ENCOUNTER SYMPTOMS
COLOR CHANGE: 0
CONFUSION: 0
AGITATION: 0
DYSPHORIC MOOD: 1
MYALGIAS: 0
NECK PAIN: 0
HEADACHES: 0
DIFFICULTY URINATING: 0
PALPITATIONS: 0
DYSURIA: 0
HALLUCINATIONS: 0
ABDOMINAL DISTENTION: 0
FATIGUE: 0
CHILLS: 0
NERVOUS/ANXIOUS: 1
CONSTIPATION: 0
COUGH: 0
ARTHRALGIAS: 0
NAUSEA: 0
DIARRHEA: 0
FEVER: 0
CHEST TIGHTNESS: 0
DIZZINESS: 0
ABDOMINAL PAIN: 0
WEAKNESS: 0
FREQUENCY: 0
SORE THROAT: 0
EYE PAIN: 0
VOMITING: 0
BACK PAIN: 0
SHORTNESS OF BREATH: 0

## 2022-06-22 ASSESSMENT — ACTIVITIES OF DAILY LIVING (ADL)
LAUNDRY: WITH SUPERVISION
DRESS: SCRUBS (BEHAVIORAL HEALTH)
ORAL_HYGIENE: INDEPENDENT
HYGIENE/GROOMING: INDEPENDENT

## 2022-06-22 NOTE — PLAN OF CARE
Problem: Suicidal Behavior  Goal: Suicidal Behavior is Absent or Managed  Outcome: Ongoing, Progressing     Problem: Alcohol Withdrawal  Goal: Alcohol Withdrawal Symptom Control  Description: 1. Detoxification from Alcohol using the [facility] valium protocol  2. Patient will complete assessment paperwork  3.  Patient will meet with  to discuss treatment and discharge planning  4. Physical examination and Lab evaluation by MD  5. Patients oral intake will be greater than 75 % of meals to meet estimated needs  6. Adequate fluid intake    Intervention: Minimize or Manage Alcohol Withdrawal Symptoms  Recent Flowsheet Documentation  Taken 6/22/2022 0858 by Bri Keys RN  Seizure Precautions: clutter-free environment maintained   Goal Outcome Evaluation:    Plan of Care Reviewed With: patient     Pt being monitored for alcohol withdrawal. Pt had been medicated at 0700 by night staff and given 10 mg of valium.     At noon pt medicated x 1 with valium 10 mg. Pt has received a total of 120 mg of valium since hospitalization. No  oversedation noted.   + hand tremors.   /80   Pulse 111   Temp 97.3  F (36.3  C) (Temporal)   Resp 16   Ht 1.829 m (6')   Wt 77.1 kg (170 lb)   SpO2 97%   BMI 23.06 kg/m      Pt out on unit.     Pt making phone calls attempting to locate where is car may be in [facility].     Pt denies hallucinations. Pt denies SI.     Rates his anxiety level a 8 on a 0-10 severe scale and his depression level a 5 on a 0-10 severe scale.     Handout on coping skills for anxiety provided.     Pt reports that he can return to his sober house.     See order for campral per Dr. Gonzales.     Discharge plans pending.

## 2022-06-22 NOTE — PLAN OF CARE
Behavioral Team Discussion: (6/22/2022)    Continued Stay Criteria/Rationale: Patient admitted for Chemical Use Issues.  Plan: The following services will be provided to the patient; psychiatric assessment, medication management, therapeutic milieu, individual and group support, and skills groups.   Participants: 3A Provider: Dr. Yasir Gonzales MD; 3A RN: Bri Keys, RN; 3A CM's: Lesa Montes.  Summary/Recommendation: Providers will assess today for treatment recommendations, discharge planning, and aftercare plans. CM will meet with pt for discharge planning.   Medical/Physical: Per ED note:  PMH of alcohol abuse, alcohol withdrawal, Lyme disease  Precautions:   Behavioral Orders   Procedures     Code 1 - Restrict to Unit     Discontinue 1:1 attendant for suicide risk     Order Specific Question:   I have performed an in person assessment of the patient     Answer:   Based on this assessment the patient no longer requires a one on one attendant at this point in time.     Order Specific Question:   Rationale     Answer:   Patient States able to remain safe in hospital     Fall precautions     Routine Programming     As clinically indicated     Seizure precautions     Status 15     Every 15 minutes.     Suicide precautions     Patients on Suicide Precautions should have a Combination Diet ordered that includes a Diet selection(s) AND a Behavioral Tray selection for Safe Tray - with utensils, or Safe Tray - NO utensils       Withdrawal precautions     Rationale for change in precautions or plan: N/A  Progress: Initial     ASAM Dimension Scale Ratings:  Dimension 1: 2 Client has some difficulty tolerating and coping with withdrawal discomfort. Client's intoxication may be severe, but responds to support and treatment such that the client does not immediately endanger self or others. Client displays moderate signs and symptoms with moderate risk of severe withdrawal.  Dimension 2: 2 Client has difficulty  tolerating and coping with physical problems or has other biomedical problems that interfere with recovery and treatment. Client neglects or does not seek care for serious biomedical problems.  Dimension 3: 2 Client has difficulty with impulse control and lacks coping skills. Client has thoughts of suicide or harm to others without means; however, the thoughts may interfere with participation in some treatment activities. Client has difficulty functioning in significant life areas. Client has moderate symptoms of emotional, behavioral, or cognitive problems. Client is able to participate in most treatment activities.  Dimension 4: 2 Client displays verbal compliance, but lacks consistent behaviors; has low motivation for change; and is passively involved in treatment.  Dimension 5: 4 No awareness of the negative impact of mental health problems or substance abuse. No coping skills to arrest mental health or addiction illnesses, or prevent relapse.  Dimension 6: 4 Client has (A) Chronically antagonistic significant other, living environment, family, peer group or long-term criminal justice involvement that is harmful to recovery or treatment progress, or (B) Client has an actively antagonistic significant other, family, work, or living environment with immediate threat to the client's safety and well-being.

## 2022-06-22 NOTE — CONSULTS
"  Internal Medicine Consult - Initial Visit       Paulo Franco MRN# [id number]   YOB: 1975 Age: 46 year old   Date of Admission: 6/21/2022  PCP: No Ref-Primary, Physician  Date of Service: 6/22/2022    Referring Provider: Yasir Gonzales MD  Reason for Consult: Medical co-management of detox          Assessment and Recommendations:   Paulo Franco is a 46 year old male with a history of alcohol use disorder, withdrawal seizures, DTs, and ADDHD admitted to station 3A for alcohol withdrawal and detox.       # Alcohol withdrawal, hx of alcohol use disorder - MSSA 11 this shift.  Reports hx of one withdrawal seizures in the past, date unknown.  Relapsed after 6 month sobriety about 1 week ago, with gradual escalation of beer to pints of hard liquor.  Tends to have auditory hallucinations during withdrawals and aware when it occurs.    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  Reports vague injury to his R knee several years ago followed by similar insult about 6 months ago.  Since then has had gradual worsening of knee pain along medial aspect of knee, feels throbbing pain and sensation of knee \"giving out\" and \"popping\".  Has noted trace swelling at times as well.  Usually needs a brace to go down stairs.  - XR R knee ordered   - Will need outpatient follow up with Sports Medicine   - Pain: alternate APAP w/ ibuprofen, heat/cold packs PRN if helpful     # Isolated AST elevation - 2/2 alcohol use.  Asymptomatic.   - No indication to trend unless new symptoms develop       Medicine will continue to follow along for XR results.  Recommendations relayed to primary team via this progress note.  Thank you for the opportunity to be involved in this patient's care.      Raisa Huffman, CNP, APRN  Internal Medicine " "ROSI Hospitalist  [facility]  Pager [phone]           History of Present Illness:   History is obtained from the patient and medical record.     This patient is a 46 year old male with a history of alcohol use disorder, polysubstance use disorder, withdrawal seizures, DTs, and ADHD admitted to station 3A for alcohol withdrawal and detox.       Internal Medicine service was asked to see patient for medical co-management of detox.  Paulo is seen in the exam room.  He reports relapsing on alcohol about a week ago after 6 months sober.  Reports one withdrawal seizure.  Felt dehydrated yesterday and thinks he passed out from heat, alcohol intoxication, and no food.  Feeling better now, eating and drinking as usual.  He reports sometimes having auditory hallucinations with his withdrawals, but this is improving.  He denies nausea, vomiting, chest pain, dyspnea, and abdominal pain.  He is concerned about an injury to his R knee.  He states that he works as a  and prone to repetitive motion injuries.   Reports vague injury to his R knee several years ago followed by similar insult about 6 months ago.  Since then has had gradual worsening of knee pain along medial aspect of knee, feels throbbing pain and sensation of knee \"giving out\" and \"popping\".  Has noted trace swelling at times as well.  Usually needs a brace to go down stairs.  Denies weakness and foot drop.                Review of Systems:   A 10 point ROS was performed and negative unless otherwise noted in HPI.           Past Medical History:   Reviewed and updated in Epic.  Past Medical History:   Diagnosis Date     Concussion      Substance abuse (H)     Done with treatment on 7/16/2018             Past Surgical History:   Reviewed and updated in Epic.  Past Surgical History:   Procedure Laterality Date     HAND SURGERY Left              Social History:   Reviewed and updated in [facility].  Social History     Socioeconomic History "     Marital status: Single     Spouse name: Not on file     Number of children: Not on file     Years of education: Not on file     Highest education level: Not on file   Occupational History     Not on file   Tobacco Use     Smoking status: Never Smoker     Smokeless tobacco: Never Used   Substance and Sexual Activity     Alcohol use: Yes     Comment: 1 liters of Vodka per day, last drank 2 hours ago     Drug use: No     Sexual activity: Yes     Partners: Female     Birth control/protection: None   Other Topics Concern     Parent/sibling w/ CABG, MI or angioplasty before 65F 55M? Not Asked   Social History Narrative     Not on file     Social Determinants of Health     Financial Resource Strain: Not on file   Food Insecurity: Not on file   Transportation Needs: Not on file   Physical Activity: Not on file   Stress: Not on file   Social Connections: Not on file   Intimate Partner Violence: Not on file   Housing Stability: Not on file              Family History:   Reviewed and updated in Epic.  Family History   Problem Relation Age of Onset     Polycystic Kidney Diease Mother      Substance Abuse Mother      Depression Mother      Heart Defect Father      Heart Defect Brother              Allergies:   No Known Allergies          Medications:     Current Facility-Administered Medications   Medication     acetaminophen (TYLENOL) tablet 650 mg     alum & mag hydroxide-simethicone (MAALOX) suspension 30 mL     atenolol (TENORMIN) tablet 50 mg     diazepam (VALIUM) tablet 5-20 mg     folic acid (FOLVITE) tablet 1 mg     hydrOXYzine (ATARAX) tablet 25 mg     multivitamin w/minerals (THERA-VIT-M) tablet 1 tablet     pantoprazole (PROTONIX) EC tablet 40 mg     thiamine (B-1) tablet 100 mg     traZODone (DESYREL) tablet 50 mg     Facility-Administered Medications Ordered in Other Encounters   Medication     Self Administer Medications: Behavioral Services            Physical Exam:   Blood pressure 123/87, pulse 101,  temperature 97.4  F (36.3  C), temperature source Temporal, resp. rate 16, height 1.829 m (6'), weight 77.1 kg (170 lb), SpO2 97 %.  Body mass index is 23.06 kg/m .    GENERAL: Alert and oriented x 3. Well nourished, well developed.  No acute distress.    HEENT: Normocephalic, atraumatic. Anicteric sclera. Mucous membranes moist.   CV: RRR. S1, S2. No murmurs appreciated.   RESPIRATORY: Effort normal on room air. Lungs CTAB with no wheezing, rales, or rhonchi.   GI: Abdomen soft and non distended, bowel sounds present x all 4 quadrants. No tenderness, rebound, or guarding.   NEUROLOGICAL: No focal deficits. Follows commands.  Strength equal in upper and lower extremities.   MUSCULOSKELETAL: Trace swelling about R knee more medial than lateral.  Limited flexion/extension RLE due to pain.   EXTREMITIES: No gross deformities. No peripheral edema.  SKIN: Grossly warm, dry, and intact. No jaundice. No rashes.             Data:   I personally reviewed the following studies:    ROUTINE IP LABS (Last four results)  CMP Recent Labs   Lab 06/21/22  0358      POTASSIUM 3.6   CHLORIDE 100   CO2 25   ANIONGAP 14   *   BUN 15   CR 0.69   TIMOTHY 8.4*   PROTTOTAL 7.9   ALBUMIN 3.8   BILITOTAL 0.9   ALKPHOS 92   AST 60*   ALT 40     CBC   Recent Labs   Lab 06/21/22  0358   WBC 9.5   RBC 4.46   HGB 14.6   HCT 40.1   MCV 90   MCH 32.7   MCHC 36.4   RDW 13.0        INR No lab results found in last 7 days.      Unresulted Labs Ordered in the Past 30 Days of this Admission     Date and Time Order Name Status Description    6/22/2022 12:30 AM Folate In process     6/22/2022 12:30 AM Vitamin B12 In process     6/22/2022 12:30 AM TSH with free T4 reflex and/or T3 as indicated In process     6/22/2022 12:30 AM Lipid panel In process     6/22/2022 12:30 AM GGT In process

## 2022-06-22 NOTE — PROGRESS NOTES
" 06/21/22 2100   Group Therapy Session   Time Session Began 1645   Time Session Ended 1738   Total Time patient participated (minutes) 53   Total # Attendees 3-5   Group Type expressive therapy   Group Topic Covered disease of addiction/choices in recovery;relapse prevention   Group Session Detail \"Starting Over\" :MT shared original songs on the topic with self-care letter for patients to fill out and share, if they wished.    Patient Response/Contribution cooperative with task;discussed personal experience with topic   Patient Response Detail Shared about writing a letter to his girlfriend of gratitude for her support.  Calm affect.        "

## 2022-06-22 NOTE — PLAN OF CARE
Goal Outcome Evaluation:    Plan of Care Reviewed With: patient     Patient continues in alcohol detox.    He was visible in the milieu for the first few hours of the shift. He laid down around 1830, woke up for 2000 vitals, returned to bed, and slept for the rest of the evening.    He ate 100% of his dinner.    He rated anxiety 2/10 and depression 4/10.  Pt denied SI/SIB/HI. Pt contracts for safety.    He denied pain.    1600 vitals: BP (!) 131/90   Pulse 83   Temp 97.5  F (36.4  C) (Temporal)  SpO2 96%    2000 vitals: BP (!) 141/99   Pulse 80   Temp 97.9  F (36.6  C) (Temporal)  SpO2 96%    MSSA: 9 & 6    PRNs administered this shift: valium 10 mg
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
## 2022-06-22 NOTE — PROGRESS NOTES
"Met with pt to discuss aftercare plans. Pt states he has spoken with his sober house \"[facility]\" and is planning to return. States he has new rules to follow due to relapse including daily breathalyzers and AA meetings. Pt declines interest in treatment. States he is feeling less depressed and no longer endorses thoughts of suicide. Reports he has spoken with his girlfriend and \"gotten things sorted out.\" Pt was in bed resting, will discuss possible referrals to therapy and psychiatry later in the afternoon when pt is more alert. Pt has Blue Plus for insurance, AVS initiated.     Update: Scheduled pt for psychiatry and therapy at [facility] in [place]. Psychiatry for 7/14 at 11:30 am, and therapy for 7/11 at 2:30 pm.  "

## 2022-06-22 NOTE — PROGRESS NOTES
Pt leaving unit for x ray of knee on a ticket to ride escorted by [employer] staff.     Dr. galaviz approved a code 2 for procedure.

## 2022-06-22 NOTE — PROGRESS NOTES
06/22/22 1800   Group Therapy Session   Group Attendance attended group session   Time Session Began 1645   Time Session Ended 1735   Total Time patient participated (minutes) 30   Total # Attendees 7   Group Type recreation   Group Topic Covered coping skills/lifestyle management   Group Session Detail healthy coping skills   Patient Response/Contribution cooperative with task   Patient Response Detail Pt participated in Therapeutic Recreation group with focus on leisure education and acquisition of knowledge and skills. Pt entered group late, but was fully engaged and cooperative in group recreational intervention; leisure inventory. Pt was focused on the written portion for the first part of group and then contributed to the group discussion following. Pt discussed several recreational interests and positive coping skills that are healthy outlets. Pt mood was calm and was appropriate with interactions.

## 2022-06-22 NOTE — PLAN OF CARE
Problem: Alcohol Withdrawal  Goal: Alcohol Withdrawal Symptom Control  Outcome: Ongoing, Progressing       The patient continues on MSSA for alcohol withdrawal.  The patient denied pain and all mental health symptoms. Will continue to monitor.

## 2022-06-22 NOTE — DISCHARGE INSTRUCTIONS
Behavioral Discharge Planning and Instructions  THANK YOU FOR CHOOSING [address]  [phone]    Summary: You were admitted to [facility] on 6/21/22 for detoxification from alcohol.  A medical exam was performed that included lab work. You have met with a  and opted to return to sober living, AA, therapy and psychiatry appointments.  Please take care and make your recovery a daily priority, Paulo!  It was a pleasure working with you and the entire treatment team here wishes you the very best in your recovery!     Recommendation:  AA, psychiatry and therapy follow-up    Main Diagnoses:  Per Dr. Yasir Gonzales MD; psychiatrist  Alcohol use disorder severe  Alcohol withdrawal severe  Suicide ideation in the emergency room  Alcohol induced mood disorder    Major Treatments, Procedures and Findings:  Your alcohol withdrawal was treated with valium.     You declined a chemical dependency assessment. You had labs drawn and those results were reviewed with you. Please take a copy of your lab work with you to your next primary care provider appointment.    Symptoms to Report:  If you experience more anxiety, confusion, sleeplessness, deep sadness or thoughts of suicide, notify your treatment team or notify your primary care provider. IF ANY OF THE SYMPTOMS YOU ARE EXPERIENCING ARE A MEDICAL EMERGENCY CALL 911 IMMEDIATELY.     Lifestyle Adjustment: Health Action Plan:  1.Create a daily schedule  2. Eat Healthy  3. Plan Enjoyable Sober Activities  4. Use Problem Solving Skills and Deal with Issues as they Arise.   5. Be Physically Active  6. Take your medications as prescribed  7. Get enough restful sleep  8. Practice Relaxation  9. Spend time with Supportive People  10. No use of alcohol, illegal drugs or addictive medications other than what is currently prescribed.   11.AA, NA Sponsor are excellent resources for support  12. Explore how  you can utilize spirituality in your  recovery    Disposition: [facility]    Facts about COVID19 at www.cdc.gov/COVID19 and www.[place].gov/covid19    Keeping hands clean is one of the most important steps we can take to avoid getting sick and spreading germs to others.  Please wash your hands frequently and lather with soap for at least 20 seconds!    Medical Follow-Up:  [facility] in [place], [place]  COVID-19 info: [employer]  Address: [address], [place] [address]  Hours:   Open ? Closes 5PM  Phone: [phone]    You are aware you should make a follow up appointment with your primary care doctor for medical and medication management      Psychiatry Follow-Up:  [facility]  Appointment: Thursday July 14th at 11:30 am  Gayathribeba Benjamin CNP  [address], [place], [place] [address] [phone]  *In person appointment    Therapy Follow-Up:  [facility]  Appointment: Monday July 11th at 2:30 pm  Gaby Lino LICSW  [address], [place], [place] [address] [phone]  *In person appointment    Recovery apps for your phone to locate current in person and zoom recovery meetings  [place] - meeting bernardino  AA  - meeting bernardino  Meeting guide - meeting bernardino  Quick NA meeting - meeting bernardino  Jolie- has various apps    Resources:  *due to covid-19 most AA/NA meetings are being held online however some are in-person or a hybrid combination please check online to verify*  AA meetings search for them at: https://aa-intergroup.org (worldwide meeting listings)  AA meetings for [place] area can be found online at: https://a[place].org (click local online meetings listings)  NA meetings for [place] area can be found online at: https://www.nam[place].org  (click find a meeting)  AA and NA Sponsors are excellent resources for support and you can find one at any support group meeting.   Alcoholics Anonymous (https://aa.org/): for information 24  hours/day  AA Intergroup service office in [place] (http://www.aastpaul.org/) 658.445.9489  AA Intergroup service office in [facility]: [phone]. (http://www.a[place].org/)  Narcotics Anonymous (www.nam[place].org) (325) 358-1880  https://aafairviewriverside.org/meetings  SMART Recovery - self management for addiction recovery:  www.smartrecExoprisey.org  Pathways ~ A Health Crisis Resource & Support Center:  174.404.4193.  https://prescribetoprevent.org/patient-education/videos/  http://www.harmreduction.org  [facility] 167-929-1989  Support Group:  AA/NA and Sponsor/support.  National [place] on Mental Illness (www.[place].trevor.org): 259.251.5092 or 861-361-2172.  Alcoholics Anonymous (www.alcoholics-anonymous.org): Check your phone book for your local chapter.  Suicide Awareness Voices of Education (SAVE) (www.save.org): 413-920-ENIU (0488)  National Suicide Prevention Line (www.mentalhealthmn.org): 736-657-PXBJ (4082)  Mental Health Consumer/Survivor Network of [place] (www.mhcsn.net): 166.598.5861 or 721-991-8726  Mental Health Association of [place] (www.mentalhealth.org): 938.764.5858 or 394-632-8108   Substance Abuse and Mental Health Services (www.samhsa.gov)  [place] Opioid Prevention Coalition: www.opioidcoalition.org    [place] Recovery Connection (MRC)  [facility] connects people seeking recovery to resources that help foster and sustain long-term recovery.  Whether you are seeking resources for treatment, transportation, housing, job training, education, health care or other pathways to recovery, [facility] is a great place to start.  860.481.4070.  www.Zerista.org    Great Pod casts for nutrition and wellness  Listen on Apple Podcasts  Dishing [employer] Nutrition   [employer]   Nutrition       Understand the connection between what you eat and how you feel. Hosted by licensed nutritionists and dietitians from [employer] we share practical,  real-life solutions for healthier living through nutrition.     General Medication Instructions:   See your medication sheet(s) for instructions.   Take all medications as prescribed.  Make no changes unless your primary care provider suggests them.   Go to all your primary care provider visits.  Be sure to have all your required lab tests. This way, your medicines can be refilled on time.  Do not use any forms of alcohol.    Please Note:  If you have any questions at anytime after you are discharged please call [facility] detox unit 3AW at [phone].  [facility], Behavioral Intake [phone]  Medical Records call [phone]  Outpatient Behavioral Intake call [phone]  LP+ Wait List/Bed Availability call [phone]    Please remember to take all of your behavioral discharge planning and lab paperwork to any follow up appointments, it contains your lab results, diagnosis, medication list and discharge recommendations.      THANK YOU FOR CHOOSING [facility]

## 2022-06-23 VITALS
TEMPERATURE: 97.4 F | RESPIRATION RATE: 16 BRPM | BODY MASS INDEX: 23.03 KG/M2 | HEIGHT: 72 IN | HEART RATE: 91 BPM | OXYGEN SATURATION: 95 % | WEIGHT: 170 LBS | DIASTOLIC BLOOD PRESSURE: 74 MMHG | SYSTOLIC BLOOD PRESSURE: 114 MMHG

## 2022-06-23 PROCEDURE — 99239 HOSP IP/OBS DSCHRG MGMT >30: CPT | Performed by: PSYCHIATRY & NEUROLOGY

## 2022-06-23 PROCEDURE — 250N000013 HC RX MED GY IP 250 OP 250 PS 637: Performed by: PSYCHIATRY & NEUROLOGY

## 2022-06-23 RX ORDER — FOLIC ACID 1 MG/1
1 TABLET ORAL DAILY
Qty: 30 TABLET | Refills: 0 | Status: ON HOLD | OUTPATIENT
Start: 2022-06-23 | End: 2024-04-24

## 2022-06-23 RX ORDER — TRAZODONE HYDROCHLORIDE 50 MG/1
50 TABLET, FILM COATED ORAL
Qty: 30 TABLET | Refills: 0 | Status: ON HOLD | OUTPATIENT
Start: 2022-06-23 | End: 2024-04-19

## 2022-06-23 RX ORDER — ACAMPROSATE CALCIUM 333 MG/1
666 TABLET, DELAYED RELEASE ORAL 3 TIMES DAILY
Qty: 180 TABLET | Refills: 0 | Status: ON HOLD | OUTPATIENT
Start: 2022-06-23 | End: 2024-04-19

## 2022-06-23 RX ORDER — HYDROXYZINE HYDROCHLORIDE 25 MG/1
25 TABLET, FILM COATED ORAL EVERY 4 HOURS PRN
Qty: 30 TABLET | Refills: 0 | Status: ON HOLD | OUTPATIENT
Start: 2022-06-23 | End: 2024-04-24

## 2022-06-23 RX ORDER — LANOLIN ALCOHOL/MO/W.PET/CERES
100 CREAM (GRAM) TOPICAL DAILY
Qty: 30 TABLET | Refills: 0 | Status: ON HOLD | OUTPATIENT
Start: 2022-06-23 | End: 2024-04-24

## 2022-06-23 RX ORDER — PANTOPRAZOLE SODIUM 40 MG/1
40 TABLET, DELAYED RELEASE ORAL DAILY
Qty: 30 TABLET | Refills: 0 | Status: ON HOLD | OUTPATIENT
Start: 2022-06-23 | End: 2024-04-24

## 2022-06-23 RX ORDER — MULTIPLE VITAMINS W/ MINERALS TAB 9MG-400MCG
1 TAB ORAL DAILY
Qty: 30 TABLET | Refills: 0 | Status: ON HOLD | OUTPATIENT
Start: 2022-06-23 | End: 2024-04-24

## 2022-06-23 RX ADMIN — ACAMPROSATE CALCIUM 666 MG: 333 TABLET, DELAYED RELEASE ORAL at 08:14

## 2022-06-23 RX ADMIN — MULTIPLE VITAMINS W/ MINERALS TAB 1 TABLET: TAB at 08:14

## 2022-06-23 RX ADMIN — THIAMINE HCL TAB 100 MG 100 MG: 100 TAB at 08:15

## 2022-06-23 RX ADMIN — FOLIC ACID 1 MG: 1 TABLET ORAL at 08:14

## 2022-06-23 RX ADMIN — PANTOPRAZOLE SODIUM 40 MG: 40 TABLET, DELAYED RELEASE ORAL at 08:15

## 2022-06-23 ASSESSMENT — ACTIVITIES OF DAILY LIVING (ADL)
HYGIENE/GROOMING: INDEPENDENT
DRESS: STREET CLOTHES
ORAL_HYGIENE: INDEPENDENT
LAUNDRY: WITH SUPERVISION

## 2022-06-23 NOTE — PLAN OF CARE
Problem: Alcohol Withdrawal  Goal: Alcohol Withdrawal Symptom Control  Outcome: Ongoing, Progressing   Goal Outcome Evaluation:    The patient's MSSA score was a three and he appeared to have slept throughout the night. No concerns reported or observed.

## 2022-06-23 NOTE — PLAN OF CARE
Goal Outcome Evaluation:    Problem: Alcohol Withdrawal  Goal: Alcohol Withdrawal Symptom Control  Outcome: Ongoing, Progressing     Patient has been visible in the milieu. Attended groups. AOx3.    MSSAs were 7 and 4. HR was elevated at 115; atenolol 50mg was administered during the first assessment. Patient denied having withdrawal symptoms. HR did reduce to 85.    Patient denied anxiety and depression. Denied SI/SIB.    No concerns voiced. We'll continue to monitor.

## 2022-06-23 NOTE — PLAN OF CARE
Problem: Plan of Care - These are the overarching goals to be used throughout the patient stay.    Goal: Readiness for Transition of Care  Outcome: Adequate for Care Transition   Goal Outcome Evaluation:    Plan of Care Reviewed With: patient     Overall Patient Progress: improving         Problem: Suicidal Behavior  Goal: Suicidal Behavior is Absent or Managed  Intervention: Provide Immediate and Ongoing Protective Physical Environment  Recent Flowsheet Documentation  Taken 6/23/2022 1124 by Bri Keys, RN  Safe Transition Promotion: protective factors promoted     Problem: Alcohol Withdrawal  Goal: Alcohol Withdrawal Symptom Control  Description: 1. Detoxification from Alcohol using the MSSA valium protocol  2. Patient will complete assessment paperwork  3.  Patient will meet with  to discuss treatment and discharge planning  4. Physical examination and Lab evaluation by MD  5. Patients oral intake will be greater than 75 % of meals to meet estimated needs  6. Adequate fluid intake    Intervention: Minimize or Manage Alcohol Withdrawal Symptoms  Recent Flowsheet Documentation  Taken 6/23/2022 1124 by Bri Keys, RN  Seizure Precautions: clutter-free environment maintained        Pt being discharged to sober home.   Pt will be taking a bus home and a bus token provided.   No medications for alcohol withdrawal x 24 hours. Alcohol withdrawal is completed.       Pt out on unit attending unit programming.     Pt reports his mood as good. Denies feeling depressed. Denies feeling anxious.     Denies SI.     Appetite and sleep have been good.     Pt met with unit  and has referral for psychiatrist and therapist.     Pt plans to go to bank and look for his car.     Reports support is PAT guerrero .    Coping skills include going to meetings or calling his support people.   Or go for a bike ride.     Pt has showered and gotten dressed.     No report of knee  pain.    Pt provided contact information for [facility] [place] [facility] for medical follow up prn.     Reviewed discharge instructions with patient and he verbalized understanding.     Reviewed labs with patient.     Discharge medications provided.      See discharge instructions.

## 2022-06-23 NOTE — DISCHARGE SUMMARY
Paulo Franco MRN# [id number]   Age: 46 year old YOB: 1975     Date of Admission:  6/21/2022  Date of Discharge:  6/23/2022  Admitting Physician:  Yasir Gonzales MD  Discharge Physician:  Yasir Gonzales MD      DISCHARGE  DX      Alcohol use disorder severe         Event Leading to Hospitalization:     See Admission note by admitting provider for patient encounter. for additional details.          Hospital Course:   PATIENT was admitted to [facility]with attending  under DR gonzales, please review the detailed admit note on 6/22/22   The patient was placed under status 15 (15 minute checks) to ensure patient safety.   MSSA protocol was initiated due to the patient's history of alcohol abuse and concern for withdrawal symptoms.  CBC, BMP and utox obtained.    All outpatient medications were continued    PATIENTdid participate in groups and was visible in the milieu.     The patient's symptoms of alochol withdrawal  improved.     Patients energy motivation , sleep appetite improved.  Pt completed detox . It was un eventful.  Pt denied any active suicidal/homicidal ideation plan intent .    Pt report s he has his girlfriend to think about   Discussed with patient medications for craving.  Spoke with patient about triggers coping skills relapse prevention.    CONSULTS DONE DURING PATIENTS HOSPITALIZATION.  Patient was seen by medicine on date6/22/22    This as per their medical consult         Assessment and Recommendations:   Paulo Franco is a 46 year old male with a history of alcohol use disorder, withdrawal seizures, DTs, and ADDHD admitted to station 3A for alcohol withdrawal and detox.        # Alcohol withdrawal, hx of alcohol use disorder - MSSA 11 this shift.  Reports hx of one withdrawal seizures in the past, date unknown.  Relapsed after 6 month sobriety about 1 week ago, with gradual escalation of beer to pints of hard liquor.  Tends to have auditory hallucinations during  "withdrawals and aware when it occurs.    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  Reports vague injury to his R knee several years ago followed by similar insult about 6 months ago.  Since then has had gradual worsening of knee pain along medial aspect of knee, feels throbbing pain and sensation of knee \"giving out\" and \"popping\".  Has noted trace swelling at times as well.  Usually needs a brace to go down stairs.  - XR R knee ordered   - Will need outpatient follow up with Sports Medicine   - Pain: alternate APAP w/ ibuprofen, heat/cold packs PRN if helpful     # Isolated AST elevation - 2/2 alcohol use.  Asymptomatic.   - No indication to trend unless new symptoms develop         Medicine will continue to follow along for XR results.  Recommendations relayed to primary team via this progress note.  Thank you for the opportunity to be involved in this patient's care.              Pt was seen by cm  As per recommendations from cm  Met with pt to discuss aftercare plans. Pt states he has spoken with his sober house \"[facility]\" and is planning to return. States he has new rules to follow due to relapse including daily breathalyzers and AA meetings. Pt declines interest in treatment. States he is feeling less depressed and no longer endorses thoughts of suicide. Reports he has spoken with his girlfriend and \"gotten things sorted out.\" Pt was in bed resting, will discuss possible referrals to therapy and psychiatry later in the afternoon when pt is more alert. Pt has Blue Plus for insurance, AVS initiated.      Update: Scheduled pt for psychiatry and therapy at [facility] in [place]. Psychiatry for 7/14 at 11:30 am, and therapy for 7/11 at 2:30 pm.           Labs:reviewed with patient       Recent " Results (from the past 48 hour(s))   Drug abuse screen 1 urine (ED)    Collection Time: 06/21/22 10:05 AM   Result Value Ref Range    Amphetamines Urine Screen Negative Screen Negative    Barbiturates Urine Screen Negative Screen Negative    Benzodiazepines Urine Screen Positive (A) Screen Negative    Cannabinoids Urine Screen Negative Screen Negative    Cocaine Urine Screen Negative Screen Negative    Opiates Urine Screen Negative Screen Negative   GGT    Collection Time: 06/22/22  7:59 AM   Result Value Ref Range    GGT 31 0 - 75 U/L   Lipid panel    Collection Time: 06/22/22  7:59 AM   Result Value Ref Range    Cholesterol 287 (H) <200 mg/dL    Triglycerides 161 (H) <150 mg/dL    Direct Measure HDL 67 >=40 mg/dL    LDL Cholesterol Calculated 188 (H) <=100 mg/dL    Non HDL Cholesterol 220 (H) <130 mg/dL   TSH with free T4 reflex and/or T3 as indicated    Collection Time: 06/22/22  7:59 AM   Result Value Ref Range    TSH 1.91 0.40 - 4.00 mU/L   Vitamin B12    Collection Time: 06/22/22  7:59 AM   Result Value Ref Range    Vitamin B12 649 232 - 1,245 pg/mL   Folate    Collection Time: 06/22/22  7:59 AM   Result Value Ref Range    Folic Acid 11.7 4.6 - 34.8 ng/mL         Recent Results (from the past 240 hour(s))   Alcohol breath test POCT    Collection Time: 06/21/22  1:21 AM   Result Value Ref Range    Alcohol Breath Test 0.2 (A) 0.00 - 0.01   Comprehensive metabolic panel    Collection Time: 06/21/22  3:58 AM   Result Value Ref Range    Sodium 139 133 - 144 mmol/L    Potassium 3.6 3.4 - 5.3 mmol/L    Chloride 100 94 - 109 mmol/L    Carbon Dioxide (CO2) 25 20 - 32 mmol/L    Anion Gap 14 3 - 14 mmol/L    Urea Nitrogen 15 7 - 30 mg/dL    Creatinine 0.69 0.66 - 1.25 mg/dL    Calcium 8.4 (L) 8.5 - 10.1 mg/dL    Glucose 139 (H) 70 - 99 mg/dL    Alkaline Phosphatase 92 40 - 150 U/L    AST 60 (H) 0 - 45 U/L    ALT 40 0 - 70 U/L    Protein Total 7.9 6.8 - 8.8 g/dL    Albumin 3.8 3.4 - 5.0 g/dL    Bilirubin Total 0.9 0.2 -  1.3 mg/dL    GFR Estimate >90 >60 mL/min/1.73m2   Asymptomatic COVID-19 Virus (Coronavirus) by PCR Nasopharyngeal    Collection Time: 06/21/22  3:58 AM    Specimen: Nasopharyngeal; Swab   Result Value Ref Range    SARS CoV2 PCR Negative Negative   CBC with platelets and differential    Collection Time: 06/21/22  3:58 AM   Result Value Ref Range    WBC Count 9.5 4.0 - 11.0 10e3/uL    RBC Count 4.46 4.40 - 5.90 10e6/uL    Hemoglobin 14.6 13.3 - 17.7 g/dL    Hematocrit 40.1 40.0 - 53.0 %    MCV 90 78 - 100 fL    MCH 32.7 26.5 - 33.0 pg    MCHC 36.4 31.5 - 36.5 g/dL    RDW 13.0 10.0 - 15.0 %    Platelet Count 236 150 - 450 10e3/uL    % Neutrophils 74 %    % Lymphocytes 21 %    % Monocytes 5 %    % Eosinophils 0 %    % Basophils 0 %    % Immature Granulocytes 0 %    NRBCs per 100 WBC 0 <1 /100    Absolute Neutrophils 6.9 1.6 - 8.3 10e3/uL    Absolute Lymphocytes 2.0 0.8 - 5.3 10e3/uL    Absolute Monocytes 0.5 0.0 - 1.3 10e3/uL    Absolute Eosinophils 0.0 0.0 - 0.7 10e3/uL    Absolute Basophils 0.0 0.0 - 0.2 10e3/uL    Absolute Immature Granulocytes 0.0 <=0.4 10e3/uL    Absolute NRBCs 0.0 10e3/uL   Drug abuse screen 1 urine (ED)    Collection Time: 06/21/22 10:05 AM   Result Value Ref Range    Amphetamines Urine Screen Negative Screen Negative    Barbiturates Urine Screen Negative Screen Negative    Benzodiazepines Urine Screen Positive (A) Screen Negative    Cannabinoids Urine Screen Negative Screen Negative    Cocaine Urine Screen Negative Screen Negative    Opiates Urine Screen Negative Screen Negative   GGT    Collection Time: 06/22/22  7:59 AM   Result Value Ref Range    GGT 31 0 - 75 U/L   Lipid panel    Collection Time: 06/22/22  7:59 AM   Result Value Ref Range    Cholesterol 287 (H) <200 mg/dL    Triglycerides 161 (H) <150 mg/dL    Direct Measure HDL 67 >=40 mg/dL    LDL Cholesterol Calculated 188 (H) <=100 mg/dL    Non HDL Cholesterol 220 (H) <130 mg/dL   TSH with free T4 reflex and/or T3 as indicated     Collection Time: 06/22/22  7:59 AM   Result Value Ref Range    TSH 1.91 0.40 - 4.00 mU/L   Vitamin B12    Collection Time: 06/22/22  7:59 AM   Result Value Ref Range    Vitamin B12 649 232 - 1,245 pg/mL   Folate    Collection Time: 06/22/22  7:59 AM   Result Value Ref Range    Folic Acid 11.7 4.6 - 34.8 ng/mL            Because this patient meets criteria for an Alcohol Use Disorder, I performed the following brief intervention on the date of this note:              1) Expressed concern that the patient is drinking at unhealthy levels known to increase their risk of alcohol related problems              2) Gave feedback linking alcohol use and health, including personalized feedback explaining how alcohol use can interact with their medical and/or psychiatric problems, and with prescribed medications.              3) Advised patient to abstain.    PT counseled on nicotine cessation and nicotine replacement provided    Discussed with patient many issues of addiction,triggers, relapse, and establishing a solid recovery program.    DISCHARGE MENTAL STATUS EXAMINATION:  The patient is alert, oriented x3.  Good fund of knowledge.  Good use of language.  Recent and remote memory, language, fund of knowledge are all adequate.  Euthymic mood congruent affect  Speech normal rate/rhythm linear tp no loose asso,The patient does not have any active suicidal or homicidal ideation.  Does not have any auditory or visual hallucination.  Fair insight/judgment At this time, the patient was stable to be discharged.        Pt was not determined to not be a danger to himself or others. At the current time of discharge, the patient does not meet criteria for involuntary hospitalization. On the day of discharge, the patient reports that they do not have suicidal or homicidal ideation and would never hurt themselves or others. Steps taken to minimize risk include: assessing patient s behavior and thought process daily during hospital stay,  discharging patient with adequate plan for follow up for mental and physical health and discussing safety plan of returning to the hospital should the patient ever have thoughts of harming themselves or others. Therefore, based on all available evidence including the factors cited above, the patient does not appear to be at imminent risk for self-harm, and is appropriate for outpatient level of care.     Educated about side effects/risk vs benefits /alternative including non treatment.Pt consented to be on medication.     .Total time spent on discharge summary more than 35 min  More than  20 min  planning, coordination of care, medication reconciliation and performance of physical exam on day of discharge.Care was coordinated with unit RN and unit therapist         Review of your medicines      UNREVIEWED medicines. Ask your doctor about these medicines      Dose / Directions   acamprosate 333 MG EC tablet  Commonly known as: CAMPRAL  Used for: Alcohol use disorder, severe, dependence (H)      Dose: 666 mg  Take 2 tablets (666 mg) by mouth 3 times daily  Quantity: 180 tablet  Refills: 1     folic acid 1 MG tablet  Commonly known as: FOLVITE  Used for: Alcohol withdrawal syndrome without complication (H)      Dose: 1 mg  Take 1 tablet (1 mg) by mouth daily  Quantity: 30 tablet  Refills: 0     hydrOXYzine 25 MG tablet  Commonly known as: ATARAX  Used for: Alcohol withdrawal syndrome without complication (H)      Dose: 25 mg  Take 1 tablet (25 mg) by mouth every 4 hours as needed for anxiety  Quantity: 30 tablet  Refills: 0     multivitamin w/minerals tablet  Used for: Alcohol withdrawal syndrome without complication (H)      Dose: 1 tablet  Take 1 tablet by mouth daily  Quantity: 30 tablet  Refills: 0     pantoprazole 40 MG EC tablet  Commonly known as: PROTONIX  Used for: Alcohol withdrawal syndrome without complication (H)      Dose: 40 mg  Take 1 tablet (40 mg) by mouth daily  Quantity: 30 tablet  Refills: 0    "  thiamine 100 MG tablet  Commonly known as: B-1  Used for: Alcohol withdrawal syndrome without complication (H)      Dose: 100 mg  Take 1 tablet (100 mg) by mouth daily  Quantity: 30 tablet  Refills: 0     traZODone 50 MG tablet  Commonly known as: DESYREL  Used for: Alcohol withdrawal syndrome without complication (H)      Dose: 50 mg  Take 1 tablet (50 mg) by mouth nightly as needed for sleep (may repeat after 60 minutes)  Quantity: 30 tablet  Refills: 0             Disposition: [facility]       Facts about COVID19 at www.cdc.gov/COVID19 and www.[place].gov/covid19     Keeping hands clean is one of the most important steps we can take to avoid getting sick and spreading germs to others.  Please wash your hands frequently and lather with soap for at least 20 seconds!     Medical Follow-Up:Medical Follow-Up:  [facility] in [place], [place]  COVID-19 info: [facility]  Address: [address] [place] [address]  Hours:   Open ? Closes 5PM  Phone: [phone]     Psychiatry Follow-Up:  [facility]  Appointment: Thursday July 14th at 11:30 am  Sergio Alexander CNP  [address] [facility] [address], [place] [address]  [phone]  *In person appointment     Therapy Follow-Up:  [facility]  Appointment: Monday July 11th at 2:30 pm  Gaby BLACKSW  [address] [facility] [address] [facility] [address] [facility] [address], [place] [address] [phone]  *In person appointment            Treatment Follow-Up:aa  .        \"Much or all of the text in this note was generated through the use of Dragon Dictate voice to text software. Errors in spelling or words which appear to be out of contact are unintentional, may be present due having escaped editing\"     "

## 2022-06-24 ENCOUNTER — PATIENT OUTREACH (OUTPATIENT)
Dept: CARE COORDINATION | Facility: CLINIC | Age: 47
End: 2022-06-24

## 2022-06-24 DIAGNOSIS — Z71.89 OTHER SPECIFIED COUNSELING: ICD-10-CM

## 2022-06-24 NOTE — PROGRESS NOTES
Clinic Care Coordination Contact  [facility]/Voicemail       Clinical Data: Care Coordinator Outreach  Outreach attempted x 1.  Left message on patient's voicemail with call back information and requested return call.  Plan: Care Coordinator will try to reach patient again in 1-2 business days.    SUNDEEP Guzman  Connected Care Resource Center  [facility]     *Connected Care Resource Team does NOT follow patient ongoing. Referrals are identified based on internal discharge reports and the outreach is to ensure patient has an understanding of their discharge instructions.

## 2022-06-27 NOTE — PROGRESS NOTES
Clinic Care Coordination Contact  [facility]/Voicemail       Clinical Data: Care Coordinator Outreach  Outreach attempted x 2.  Left message on patient's voicemail with call back information and requested return call.  Plan: Care Coordinator will make no further outreaches at this time.    SUNDEEP Song   Social Work Clinic Care Coordinator   [facility]  PH: [phone]  [email or web address]

## 2024-02-21 NOTE — PLAN OF CARE
Problem: Substance Misuse (Alcohol Withdrawal)  Goal: Readiness for Change Identified  Outcome: No Change     Behavioral  Pt appeared sleeping comfortably overnight; breathing was even and unlabored.      Medical  Pt out of detox from alcohol.   No new medical concerns noted.     Insertion of Mirena IUD under sonogram guidance

## 2024-04-17 ENCOUNTER — APPOINTMENT (OUTPATIENT)
Dept: GENERAL RADIOLOGY | Facility: CLINIC | Age: 49
End: 2024-04-17
Attending: STUDENT IN AN ORGANIZED HEALTH CARE EDUCATION/TRAINING PROGRAM
Payer: COMMERCIAL

## 2024-04-17 ENCOUNTER — TELEPHONE (OUTPATIENT)
Dept: BEHAVIORAL HEALTH | Facility: CLINIC | Age: 49
End: 2024-04-17

## 2024-04-17 ENCOUNTER — HOSPITAL ENCOUNTER (INPATIENT)
Facility: CLINIC | Age: 49
LOS: 5 days | Discharge: PSYCHIATRIC OR SUBSTANCE USE FACILITY | End: 2024-04-24
Attending: STUDENT IN AN ORGANIZED HEALTH CARE EDUCATION/TRAINING PROGRAM | Admitting: INTERNAL MEDICINE
Payer: COMMERCIAL

## 2024-04-17 DIAGNOSIS — R19.7 DIARRHEA, UNSPECIFIED TYPE: ICD-10-CM
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
## 2024-04-17 DIAGNOSIS — F10.930 ALCOHOL WITHDRAWAL SYNDROME WITHOUT COMPLICATION (H): ICD-10-CM

## 2024-04-17 DIAGNOSIS — R09.02 HYPOXIA: ICD-10-CM

## 2024-04-17 DIAGNOSIS — F10.20 ALCOHOL USE DISORDER, SEVERE, DEPENDENCE (H): ICD-10-CM

## 2024-04-17 DIAGNOSIS — E87.29 ALCOHOLIC KETOACIDOSIS: ICD-10-CM

## 2024-04-17 DIAGNOSIS — R06.02 SHORTNESS OF BREATH: ICD-10-CM

## 2024-04-17 DIAGNOSIS — F10.288 ALCOHOL DEPENDENCE WITH OTHER ALCOHOL-INDUCED DISORDER (H): Primary | ICD-10-CM

## 2024-04-17 DIAGNOSIS — G89.18 ACUTE POST-OPERATIVE PAIN: ICD-10-CM

## 2024-04-17 DIAGNOSIS — F10.10 ALCOHOL ABUSE: ICD-10-CM

## 2024-04-17 LAB
ALBUMIN SERPL BCG-MCNC: 4.4 G/DL (ref 3.5–5.2)
ALP SERPL-CCNC: 122 U/L (ref 40–150)
ALT SERPL W P-5'-P-CCNC: 104 U/L (ref 0–70)
ANION GAP SERPL CALCULATED.3IONS-SCNC: 22 MMOL/L (ref 7–15)
AST SERPL W P-5'-P-CCNC: 223 U/L (ref 0–45)
BASE EXCESS BLDV CALC-SCNC: 0.5 MMOL/L (ref -3–3)
BASOPHILS # BLD AUTO: 0 10E3/UL (ref 0–0.2)
BASOPHILS NFR BLD AUTO: 0 %
BILIRUB SERPL-MCNC: 0.3 MG/DL
BUN SERPL-MCNC: 11.1 MG/DL (ref 6–20)
CALCIUM SERPL-MCNC: 8.2 MG/DL (ref 8.6–10)
CHLORIDE SERPL-SCNC: 95 MMOL/L (ref 98–107)
CREAT SERPL-MCNC: 0.66 MG/DL (ref 0.67–1.17)
D DIMER PPP FEU-MCNC: 2.1 UG/ML FEU (ref 0–0.5)
DEPRECATED HCO3 PLAS-SCNC: 22 MMOL/L (ref 22–29)
EGFRCR SERPLBLD CKD-EPI 2021: >90 ML/MIN/1.73M2
EOSINOPHIL # BLD AUTO: 0 10E3/UL (ref 0–0.7)
EOSINOPHIL NFR BLD AUTO: 0 %
ERYTHROCYTE [DISTWIDTH] IN BLOOD BY AUTOMATED COUNT: 13.5 % (ref 10–15)
ETHANOL SERPL-MCNC: 0.38 G/DL
GLUCOSE SERPL-MCNC: 84 MG/DL (ref 70–99)
HCO3 BLDV-SCNC: 25 MMOL/L (ref 21–28)
HCT VFR BLD AUTO: 41.1 % (ref 40–53)
HGB BLD-MCNC: 14.4 G/DL (ref 13.3–17.7)
IMM GRANULOCYTES # BLD: 0 10E3/UL
IMM GRANULOCYTES NFR BLD: 0 %
INR PPP: 1.01 (ref 0.85–1.15)
LACTATE SERPL-SCNC: 2.9 MMOL/L (ref 0.7–2)
LACTATE SERPL-SCNC: 3.5 MMOL/L (ref 0.7–2)
LACTATE SERPL-SCNC: 4 MMOL/L (ref 0.7–2)
LYMPHOCYTES # BLD AUTO: 1.5 10E3/UL (ref 0.8–5.3)
LYMPHOCYTES NFR BLD AUTO: 34 %
MAGNESIUM SERPL-MCNC: 1.9 MG/DL (ref 1.7–2.3)
MCH RBC QN AUTO: 32.8 PG (ref 26.5–33)
MCHC RBC AUTO-ENTMCNC: 35 G/DL (ref 31.5–36.5)
MCV RBC AUTO: 94 FL (ref 78–100)
MONOCYTES # BLD AUTO: 0.4 10E3/UL (ref 0–1.3)
MONOCYTES NFR BLD AUTO: 9 %
NEUTROPHILS # BLD AUTO: 2.5 10E3/UL (ref 1.6–8.3)
NEUTROPHILS NFR BLD AUTO: 57 %
NRBC # BLD AUTO: 0 10E3/UL
NRBC BLD AUTO-RTO: 0 /100
O2/TOTAL GAS SETTING VFR VENT: 36 %
OXYHGB MFR BLDV: 87 % (ref 70–75)
PCO2 BLDV: 40 MM HG (ref 40–50)
PH BLDV: 7.41 [PH] (ref 7.32–7.43)
PHOSPHATE SERPL-MCNC: 3 MG/DL (ref 2.5–4.5)
PLATELET # BLD AUTO: 283 10E3/UL (ref 150–450)
PO2 BLDV: 57 MM HG (ref 25–47)
POTASSIUM SERPL-SCNC: 3.8 MMOL/L (ref 3.4–5.3)
PROT SERPL-MCNC: 8 G/DL (ref 6.4–8.3)
RBC # BLD AUTO: 4.39 10E6/UL (ref 4.4–5.9)
SAO2 % BLDV: 88.4 % (ref 70–75)
SODIUM SERPL-SCNC: 139 MMOL/L (ref 135–145)
WBC # BLD AUTO: 4.5 10E3/UL (ref 4–11)

## 2024-04-17 PROCEDURE — 82077 ASSAY SPEC XCP UR&BREATH IA: CPT | Performed by: STUDENT IN AN ORGANIZED HEALTH CARE EDUCATION/TRAINING PROGRAM

## 2024-04-17 PROCEDURE — 83605 ASSAY OF LACTIC ACID: CPT | Performed by: STUDENT IN AN ORGANIZED HEALTH CARE EDUCATION/TRAINING PROGRAM

## 2024-04-17 PROCEDURE — 258N000003 HC RX IP 258 OP 636: Performed by: STUDENT IN AN ORGANIZED HEALTH CARE EDUCATION/TRAINING PROGRAM

## 2024-04-17 PROCEDURE — 87040 BLOOD CULTURE FOR BACTERIA: CPT | Performed by: STUDENT IN AN ORGANIZED HEALTH CARE EDUCATION/TRAINING PROGRAM

## 2024-04-17 PROCEDURE — 93010 ELECTROCARDIOGRAM REPORT: CPT | Performed by: STUDENT IN AN ORGANIZED HEALTH CARE EDUCATION/TRAINING PROGRAM

## 2024-04-17 PROCEDURE — 36415 COLL VENOUS BLD VENIPUNCTURE: CPT | Performed by: STUDENT IN AN ORGANIZED HEALTH CARE EDUCATION/TRAINING PROGRAM

## 2024-04-17 PROCEDURE — G0378 HOSPITAL OBSERVATION PER HR: HCPCS

## 2024-04-17 PROCEDURE — 96365 THER/PROPH/DIAG IV INF INIT: CPT | Mod: 59 | Performed by: STUDENT IN AN ORGANIZED HEALTH CARE EDUCATION/TRAINING PROGRAM

## 2024-04-17 PROCEDURE — 96361 HYDRATE IV INFUSION ADD-ON: CPT

## 2024-04-17 PROCEDURE — 93005 ELECTROCARDIOGRAM TRACING: CPT | Performed by: STUDENT IN AN ORGANIZED HEALTH CARE EDUCATION/TRAINING PROGRAM

## 2024-04-17 PROCEDURE — 84100 ASSAY OF PHOSPHORUS: CPT | Performed by: STUDENT IN AN ORGANIZED HEALTH CARE EDUCATION/TRAINING PROGRAM

## 2024-04-17 PROCEDURE — 250N000011 HC RX IP 250 OP 636: Performed by: STUDENT IN AN ORGANIZED HEALTH CARE EDUCATION/TRAINING PROGRAM

## 2024-04-17 PROCEDURE — 85025 COMPLETE CBC W/AUTO DIFF WBC: CPT | Performed by: STUDENT IN AN ORGANIZED HEALTH CARE EDUCATION/TRAINING PROGRAM

## 2024-04-17 PROCEDURE — 85610 PROTHROMBIN TIME: CPT | Performed by: STUDENT IN AN ORGANIZED HEALTH CARE EDUCATION/TRAINING PROGRAM

## 2024-04-17 PROCEDURE — 82805 BLOOD GASES W/O2 SATURATION: CPT | Performed by: STUDENT IN AN ORGANIZED HEALTH CARE EDUCATION/TRAINING PROGRAM

## 2024-04-17 PROCEDURE — 250N000013 HC RX MED GY IP 250 OP 250 PS 637: Performed by: STUDENT IN AN ORGANIZED HEALTH CARE EDUCATION/TRAINING PROGRAM

## 2024-04-17 PROCEDURE — 96361 HYDRATE IV INFUSION ADD-ON: CPT | Performed by: STUDENT IN AN ORGANIZED HEALTH CARE EDUCATION/TRAINING PROGRAM

## 2024-04-17 PROCEDURE — 83735 ASSAY OF MAGNESIUM: CPT | Performed by: STUDENT IN AN ORGANIZED HEALTH CARE EDUCATION/TRAINING PROGRAM

## 2024-04-17 PROCEDURE — 80053 COMPREHEN METABOLIC PANEL: CPT | Performed by: STUDENT IN AN ORGANIZED HEALTH CARE EDUCATION/TRAINING PROGRAM

## 2024-04-17 PROCEDURE — 99285 EMERGENCY DEPT VISIT HI MDM: CPT | Mod: 25 | Performed by: STUDENT IN AN ORGANIZED HEALTH CARE EDUCATION/TRAINING PROGRAM

## 2024-04-17 PROCEDURE — 85379 FIBRIN DEGRADATION QUANT: CPT | Performed by: STUDENT IN AN ORGANIZED HEALTH CARE EDUCATION/TRAINING PROGRAM

## 2024-04-17 PROCEDURE — 71046 X-RAY EXAM CHEST 2 VIEWS: CPT

## 2024-04-17 RX ORDER — CLONIDINE HYDROCHLORIDE 0.1 MG/1
0.1 TABLET ORAL EVERY 8 HOURS
Status: DISCONTINUED | OUTPATIENT
Start: 2024-04-17 | End: 2024-04-24 | Stop reason: HOSPADM

## 2024-04-17 RX ORDER — GABAPENTIN 100 MG/1
100 CAPSULE ORAL EVERY 8 HOURS
Qty: 9 CAPSULE | Refills: 0 | Status: DISCONTINUED | OUTPATIENT
Start: 2024-04-25 | End: 2024-04-24 | Stop reason: HOSPADM

## 2024-04-17 RX ORDER — MAGNESIUM SULFATE HEPTAHYDRATE 40 MG/ML
2 INJECTION, SOLUTION INTRAVENOUS ONCE
Status: COMPLETED | OUTPATIENT
Start: 2024-04-17 | End: 2024-04-17

## 2024-04-17 RX ORDER — MULTIPLE VITAMINS W/ MINERALS TAB 9MG-400MCG
1 TAB ORAL DAILY
Status: DISCONTINUED | OUTPATIENT
Start: 2024-04-17 | End: 2024-04-23

## 2024-04-17 RX ORDER — GABAPENTIN 300 MG/1
300 CAPSULE ORAL EVERY 8 HOURS
Qty: 6 CAPSULE | Refills: 0 | Status: DISCONTINUED | OUTPATIENT
Start: 2024-04-23 | End: 2024-04-24 | Stop reason: HOSPADM

## 2024-04-17 RX ORDER — FOLIC ACID 1 MG/1
1 TABLET ORAL DAILY
Status: DISCONTINUED | OUTPATIENT
Start: 2024-04-17 | End: 2024-04-23

## 2024-04-17 RX ORDER — GABAPENTIN 300 MG/1
900 CAPSULE ORAL EVERY 8 HOURS
Qty: 27 CAPSULE | Refills: 0 | Status: COMPLETED | OUTPATIENT
Start: 2024-04-18 | End: 2024-04-20

## 2024-04-17 RX ORDER — IOPAMIDOL 755 MG/ML
100 INJECTION, SOLUTION INTRAVASCULAR ONCE
Status: COMPLETED | OUTPATIENT
Start: 2024-04-18 | End: 2024-04-18

## 2024-04-17 RX ORDER — GABAPENTIN 300 MG/1
600 CAPSULE ORAL EVERY 8 HOURS
Qty: 12 CAPSULE | Refills: 0 | Status: COMPLETED | OUTPATIENT
Start: 2024-04-21 | End: 2024-04-22

## 2024-04-17 RX ORDER — HALOPERIDOL 5 MG/ML
2.5-5 INJECTION INTRAMUSCULAR EVERY 6 HOURS PRN
Status: DISCONTINUED | OUTPATIENT
Start: 2024-04-17 | End: 2024-04-23

## 2024-04-17 RX ORDER — DIAZEPAM 10 MG/2ML
5-10 INJECTION, SOLUTION INTRAMUSCULAR; INTRAVENOUS EVERY 30 MIN PRN
Status: DISCONTINUED | OUTPATIENT
Start: 2024-04-17 | End: 2024-04-23

## 2024-04-17 RX ORDER — FLUMAZENIL 0.1 MG/ML
0.2 INJECTION, SOLUTION INTRAVENOUS
Status: DISCONTINUED | OUTPATIENT
Start: 2024-04-17 | End: 2024-04-24 | Stop reason: HOSPADM

## 2024-04-17 RX ORDER — OLANZAPINE 5 MG/1
5-10 TABLET, ORALLY DISINTEGRATING ORAL EVERY 6 HOURS PRN
Status: DISCONTINUED | OUTPATIENT
Start: 2024-04-17 | End: 2024-04-23

## 2024-04-17 RX ORDER — GABAPENTIN 600 MG/1
1200 TABLET ORAL ONCE
Status: COMPLETED | OUTPATIENT
Start: 2024-04-17 | End: 2024-04-17

## 2024-04-17 RX ORDER — DIAZEPAM 10 MG
10 TABLET ORAL EVERY 30 MIN PRN
Status: DISCONTINUED | OUTPATIENT
Start: 2024-04-17 | End: 2024-04-23

## 2024-04-17 RX ORDER — ACETAMINOPHEN 500 MG
1000 TABLET ORAL ONCE
Status: COMPLETED | OUTPATIENT
Start: 2024-04-17 | End: 2024-04-17

## 2024-04-17 RX ADMIN — DIAZEPAM 10 MG: 10 TABLET ORAL at 23:25

## 2024-04-17 RX ADMIN — MAGNESIUM SULFATE HEPTAHYDRATE 2 G: 40 INJECTION, SOLUTION INTRAVENOUS at 17:23

## 2024-04-17 RX ADMIN — DIAZEPAM 10 MG: 10 TABLET ORAL at 19:01

## 2024-04-17 RX ADMIN — SODIUM CHLORIDE 1000 ML: 9 INJECTION, SOLUTION INTRAVENOUS at 18:06

## 2024-04-17 RX ADMIN — SODIUM CHLORIDE 1000 ML: 9 INJECTION, SOLUTION INTRAVENOUS at 17:23

## 2024-04-17 RX ADMIN — FOLIC ACID 1 MG: 1 TABLET ORAL at 17:38

## 2024-04-17 RX ADMIN — CLONIDINE HYDROCHLORIDE 0.1 MG: 0.1 TABLET ORAL at 17:38

## 2024-04-17 RX ADMIN — THIAMINE HCL TAB 100 MG 100 MG: 100 TAB at 17:38

## 2024-04-17 RX ADMIN — ACETAMINOPHEN 1000 MG: 500 TABLET ORAL at 21:51

## 2024-04-17 RX ADMIN — DEXTROSE AND SODIUM CHLORIDE 500 ML: 5; 900 INJECTION, SOLUTION INTRAVENOUS at 20:14

## 2024-04-17 RX ADMIN — GABAPENTIN 1200 MG: 600 TABLET, FILM COATED ORAL at 17:26

## 2024-04-17 RX ADMIN — Medication 1 TABLET: at 17:38

## 2024-04-17 ASSESSMENT — ACTIVITIES OF DAILY LIVING (ADL)
ADLS_ACUITY_SCORE: 37

## 2024-04-17 ASSESSMENT — LIFESTYLE VARIABLES
ORIENTATION AND CLOUDING OF SENSORIUM: CANNOT DO SERIAL ADDITIONS OR IS UNCERTAIN ABOUT DATE
ORIENTATION AND CLOUDING OF SENSORIUM: CANNOT DO SERIAL ADDITIONS OR IS UNCERTAIN ABOUT DATE
HEADACHE, FULLNESS IN HEAD: MODERATE
AUDITORY DISTURBANCES: NOT PRESENT
NAUSEA AND VOMITING: NO NAUSEA AND NO VOMITING
AUDITORY DISTURBANCES: NOT PRESENT
PAROXYSMAL SWEATS: BARELY PERCEPTIBLE SWEATING, PALMS MOIST
AGITATION: SOMEWHAT MORE THAN NORMAL ACTIVITY
ANXIETY: NO ANXIETY, AT EASE
VISUAL DISTURBANCES: MILD SENSITIVITY
VISUAL DISTURBANCES: VERY MILD SENSITIVITY
TREMOR: NOT VISIBLE, BUT CAN BE FELT FINGERTIP TO FINGERTIP
HEADACHE, FULLNESS IN HEAD: MILD
ANXIETY: NO ANXIETY, AT EASE
NAUSEA AND VOMITING: NO NAUSEA AND NO VOMITING
TOTAL SCORE: 10
TOTAL SCORE: 10
ANXIETY: NO ANXIETY, AT EASE
AGITATION: 2
TOTAL SCORE: 6
PAROXYSMAL SWEATS: BARELY PERCEPTIBLE SWEATING, PALMS MOIST
VISUAL DISTURBANCES: MILD SENSITIVITY
AUDITORY DISTURBANCES: NOT PRESENT
TREMOR: 2
TREMOR: 2
AGITATION: NORMAL ACTIVITY
PAROXYSMAL SWEATS: BARELY PERCEPTIBLE SWEATING, PALMS MOIST
HEADACHE, FULLNESS IN HEAD: MILD
NAUSEA AND VOMITING: MILD NAUSEA WITH NO VOMITING
ORIENTATION AND CLOUDING OF SENSORIUM: ORIENTED AND CAN DO SERIAL ADDITIONS

## 2024-04-17 ASSESSMENT — COLUMBIA-SUICIDE SEVERITY RATING SCALE - C-SSRS
6. HAVE YOU EVER DONE ANYTHING, STARTED TO DO ANYTHING, OR PREPARED TO DO ANYTHING TO END YOUR LIFE?: NO
1. IN THE PAST MONTH, HAVE YOU WISHED YOU WERE DEAD OR WISHED YOU COULD GO TO SLEEP AND NOT WAKE UP?: NO
2. HAVE YOU ACTUALLY HAD ANY THOUGHTS OF KILLING YOURSELF IN THE PAST MONTH?: NO

## 2024-04-17 NOTE — ED TRIAGE NOTES
Last drink was noon, a liter of vodka, denies any seizure hx, denies any SI     Triage Assessment (Adult)       Row Name 04/17/24 6661          Respiratory WDL    Respiratory WDL WDL        Skin Circulation/Temperature WDL    Skin Circulation/Temperature WDL WDL        Cardiac WDL    Cardiac WDL WDL        Peripheral/Neurovascular WDL    Peripheral Neurovascular WDL WDL        Cognitive/Neuro/Behavioral WDL    Cognitive/Neuro/Behavioral WDL WDL

## 2024-04-17 NOTE — ED PROVIDER NOTES
ED Provider Note  [facility]      History     Chief Complaint   Patient presents with    Alcohol Problem     Last drink was noon, a liter of vodka, denies any seizure hx, denies any SI     The history is provided by the patient and medical records.     Paulo Franco is a 48 year old male with a history of severe alcohol use disorder with a history of DTs, alcoholic hepatitis without ascites, and TBI who presents to the emergency department for detox.    Patient states he is here to detox from alcohol.  Last drink was approximately 1 hour ago.  Patient is not taking any other medication and denies any other substance use.  Patient denies fall.  Of note, patient has sling on left arm from recent brachial plexus surgery 3/29/2024.  Patient states he has no feeling in his left hand.    Past Medical History  Past Medical History:   Diagnosis Date    Concussion     Substance abuse (H)     Done with treatment on 7/16/2018     Past Surgical History:   Procedure Laterality Date    HAND SURGERY Left      acamprosate (CAMPRAL) 333 MG EC tablet  folic acid (FOLVITE) 1 MG tablet  hydrOXYzine (ATARAX) 25 MG tablet  multivitamin w/minerals (THERA-VIT-M) tablet  pantoprazole (PROTONIX) 40 MG EC tablet  thiamine (B-1) 100 MG tablet  traZODone (DESYREL) 50 MG tablet      Allergies   Allergen Reactions    Propranolol Other (See Comments)     Hair loss     Family History  Family History   Problem Relation Age of Onset    Polycystic Kidney Diease Mother     Substance Abuse Mother     Depression Mother     Heart Defect Father     Heart Defect Brother      Social History   Social History     Tobacco Use    Smoking status: Never    Smokeless tobacco: Never   Substance Use Topics    Alcohol use: Yes     Comment: 1 liters of Vodka per day, last drank 2 hours ago    Drug use: No         A medically appropriate review of systems was performed with pertinent positives and negatives noted in the HPI, and all other  systems negative.    Physical Exam   BP: 123/79  Pulse: (!) 124  Temp: 97.6  F (36.4  C)  Resp: 16  Height: 182.9 cm (6')  Weight: 81.6 kg (180 lb)  SpO2: 94 %  Physical Exam  Constitutional:       General: He is not in acute distress.     Appearance: Normal appearance. He is ill-appearing. He is not toxic-appearing.   HENT:      Head: Atraumatic.   Eyes:      General: No scleral icterus.     Conjunctiva/sclera: Conjunctivae normal.   Cardiovascular:      Rate and Rhythm: Tachycardia present.      Heart sounds: Normal heart sounds.   Pulmonary:      Effort: Pulmonary effort is normal. No respiratory distress.      Breath sounds: Normal breath sounds. No stridor. No wheezing, rhonchi or rales.   Chest:      Chest wall: No tenderness.   Abdominal:      General: There is no distension.      Palpations: Abdomen is soft. There is no mass.      Tenderness: There is no abdominal tenderness. There is no guarding or rebound.      Hernia: No hernia is present.   Musculoskeletal:         General: No deformity.      Cervical back: Neck supple.      Comments: Left arm in sling, does not appear acutely infected, baseline lack of strength and sensation patient reports is unchanged   Skin:     General: Skin is warm.   Neurological:      Mental Status: He is alert.           ED Course, Procedures, & Data      Procedures               Results for orders placed or performed during the hospital encounter of 04/17/24   XR Chest 2 Views     Status: None    Narrative    EXAM: XR CHEST 2 VIEWS  LOCATION: [facility]  DATE: 4/17/2024    INDICATION: Chest pain.  COMPARISON: None.      Impression    IMPRESSION:     Scattered linear bands of atelectasis or scarring within the left mid to lower lung. Lungs are otherwise clear. No pleural effusions or pneumothorax. Normal pulmonary vascularity. Nonenlarged cardiac silhouette.    Remote post traumatic deformities of multiple left ribs. Scattered  surgical clips within the left axilla.   INR     Status: Normal   Result Value Ref Range    INR 1.01 0.85 - 1.15   Comprehensive metabolic panel     Status: Abnormal   Result Value Ref Range    Sodium 139 135 - 145 mmol/L    Potassium 3.8 3.4 - 5.3 mmol/L    Carbon Dioxide (CO2) 22 22 - 29 mmol/L    Anion Gap 22 (H) 7 - 15 mmol/L    Urea Nitrogen 11.1 6.0 - 20.0 mg/dL    Creatinine 0.66 (L) 0.67 - 1.17 mg/dL    GFR Estimate >90 >60 mL/min/1.73m2    Calcium 8.2 (L) 8.6 - 10.0 mg/dL    Chloride 95 (L) 98 - 107 mmol/L    Glucose 84 70 - 99 mg/dL    Alkaline Phosphatase 122 40 - 150 U/L     (H) 0 - 45 U/L     (H) 0 - 70 U/L    Protein Total 8.0 6.4 - 8.3 g/dL    Albumin 4.4 3.5 - 5.2 g/dL    Bilirubin Total 0.3 <=1.2 mg/dL   Lactic acid whole blood     Status: Abnormal   Result Value Ref Range    Lactic Acid 4.0 (HH) 0.7 - 2.0 mmol/L   Magnesium     Status: Normal   Result Value Ref Range    Magnesium 1.9 1.7 - 2.3 mg/dL   Ethyl Alcohol Level     Status: Abnormal   Result Value Ref Range    Alcohol ethyl 0.38 (HH) <=0.01 g/dL   Phosphorus     Status: Normal   Result Value Ref Range    Phosphorus 3.0 2.5 - 4.5 mg/dL   CBC with platelets and differential     Status: Abnormal   Result Value Ref Range    WBC Count 4.5 4.0 - 11.0 10e3/uL    RBC Count 4.39 (L) 4.40 - 5.90 10e6/uL    Hemoglobin 14.4 13.3 - 17.7 g/dL    Hematocrit 41.1 40.0 - 53.0 %    MCV 94 78 - 100 fL    MCH 32.8 26.5 - 33.0 pg    MCHC 35.0 31.5 - 36.5 g/dL    RDW 13.5 10.0 - 15.0 %    Platelet Count 283 150 - 450 10e3/uL    % Neutrophils 57 %    % Lymphocytes 34 %    % Monocytes 9 %    % Eosinophils 0 %    % Basophils 0 %    % Immature Granulocytes 0 %    NRBCs per 100 WBC 0 <1 /100    Absolute Neutrophils 2.5 1.6 - 8.3 10e3/uL    Absolute Lymphocytes 1.5 0.8 - 5.3 10e3/uL    Absolute Monocytes 0.4 0.0 - 1.3 10e3/uL    Absolute Eosinophils 0.0 0.0 - 0.7 10e3/uL    Absolute Basophils 0.0 0.0 - 0.2 10e3/uL    Absolute Immature Granulocytes 0.0  <=0.4 10e3/uL    Absolute NRBCs 0.0 10e3/uL   Lactic acid whole blood     Status: Abnormal   Result Value Ref Range    Lactic Acid 3.5 (H) 0.7 - 2.0 mmol/L   Lactic acid whole blood     Status: Abnormal   Result Value Ref Range    Lactic Acid 2.9 (H) 0.7 - 2.0 mmol/L   D dimer quantitative     Status: Abnormal   Result Value Ref Range    D-Dimer Quantitative 2.10 (H) 0.00 - 0.50 ug/mL FEU    Narrative    This D-dimer assay is intended for use in conjunction with a clinical pretest probability assessment model to exclude pulmonary embolism (PE) and deep venous thrombosis (DVT) in outpatients suspected of PE or DVT. The cut-off value is 0.50 ug/mL FEU.   EKG 12-lead, tracing only     Status: None (Preliminary result)   Result Value Ref Range    Systolic Blood Pressure  mmHg    Diastolic Blood Pressure  mmHg    Ventricular Rate 101 BPM    Atrial Rate 101 BPM    UT Interval 178 ms    QRS Duration 96 ms     ms    QTc 484 ms    P Axis 80 degrees    R AXIS 67 degrees    T Axis 95 degrees    Interpretation ECG       Sinus tachycardia  Nonspecific T wave abnormality  Abnormal ECG     CBC with platelets differential     Status: Abnormal    Narrative    The following orders were created for panel order CBC with platelets differential.  Procedure                               Abnormality         Status                     ---------                               -----------         ------                     CBC with platelets and d...[[id number]]  Abnormal            Final result                 Please view results for these tests on the individual orders.     Medications   cloNIDine (CATAPRES) tablet 0.1 mg (0.1 mg Oral $Given 4/17/24 3779)   OLANZapine zydis (zyPREXA) ODT tab 5-10 mg (has no administration in time range)     Or   haloperidol lactate (HALDOL) injection 2.5-5 mg (has no administration in time range)   flumazenil (ROMAZICON) injection 0.2 mg (has no administration in time range)   melatonin tablet 5 mg (has  no administration in time range)   diazepam (VALIUM) tablet 10 mg (10 mg Oral $Given 4/17/24 1901)     Or   diazepam (VALIUM) injection 5-10 mg ( Intravenous See Alternative 4/17/24 1901)   gabapentin (NEURONTIN) capsule 900 mg (has no administration in time range)   gabapentin (NEURONTIN) capsule 600 mg (has no administration in time range)   gabapentin (NEURONTIN) capsule 300 mg (has no administration in time range)   gabapentin (NEURONTIN) capsule 100 mg (has no administration in time range)   thiamine (B-1) tablet 100 mg (100 mg Oral $Given 4/17/24 1738)   folic acid (FOLVITE) tablet 1 mg (1 mg Oral $Given 4/17/24 1738)   multivitamin w/minerals (THERA-VIT-M) tablet 1 tablet (1 tablet Oral $Given 4/17/24 1738)   sodium chloride 0.9% BOLUS 1,000 mL (0 mLs Intravenous Stopped 4/17/24 1757)   gabapentin (NEURONTIN) tablet 1,200 mg (1,200 mg Oral $Given 4/17/24 1726)   sodium chloride 0.9% BOLUS 1,000 mL (0 mLs Intravenous Stopped 4/17/24 2010)   magnesium sulfate 2 g in 50 mL sterile water intermittent infusion (0 g Intravenous Stopped 4/17/24 1757)   dextrose 5% and 0.9% NaCl BOLUS 500 mL (0 mLs Intravenous Stopped 4/17/24 2134)   acetaminophen (TYLENOL) tablet 1,000 mg (1,000 mg Oral $Given 4/17/24 2151)     Labs Ordered and Resulted from Time of ED Arrival to Time of ED Departure   COMPREHENSIVE METABOLIC PANEL - Abnormal       Result Value    Sodium 139      Potassium 3.8      Carbon Dioxide (CO2) 22      Anion Gap 22 (*)     Urea Nitrogen 11.1      Creatinine 0.66 (*)     GFR Estimate >90      Calcium 8.2 (*)     Chloride 95 (*)     Glucose 84      Alkaline Phosphatase 122       (*)      (*)     Protein Total 8.0      Albumin 4.4      Bilirubin Total 0.3     LACTIC ACID WHOLE BLOOD - Abnormal    Lactic Acid 4.0 (*)    ETHYL ALCOHOL LEVEL - Abnormal    Alcohol ethyl 0.38 (*)    CBC WITH PLATELETS AND DIFFERENTIAL - Abnormal    WBC Count 4.5      RBC Count 4.39 (*)     Hemoglobin 14.4       Hematocrit 41.1      MCV 94      MCH 32.8      MCHC 35.0      RDW 13.5      Platelet Count 283      % Neutrophils 57      % Lymphocytes 34      % Monocytes 9      % Eosinophils 0      % Basophils 0      % Immature Granulocytes 0      NRBCs per 100 WBC 0      Absolute Neutrophils 2.5      Absolute Lymphocytes 1.5      Absolute Monocytes 0.4      Absolute Eosinophils 0.0      Absolute Basophils 0.0      Absolute Immature Granulocytes 0.0      Absolute NRBCs 0.0     LACTIC ACID WHOLE BLOOD - Abnormal    Lactic Acid 3.5 (*)    LACTIC ACID WHOLE BLOOD - Abnormal    Lactic Acid 2.9 (*)    D DIMER QUANTITATIVE - Abnormal    D-Dimer Quantitative 2.10 (*)    INR - Normal    INR 1.01     MAGNESIUM - Normal    Magnesium 1.9     PHOSPHORUS - Normal    Phosphorus 3.0     ROUTINE UA WITH MICROSCOPIC REFLEX TO CULTURE   LACTIC ACID WHOLE BLOOD   BLOOD GAS VENOUS   BLOOD CULTURE   BLOOD CULTURE     XR Chest 2 Views   Final Result   IMPRESSION:       Scattered linear bands of atelectasis or scarring within the left mid to lower lung. Lungs are otherwise clear. No pleural effusions or pneumothorax. Normal pulmonary vascularity. Nonenlarged cardiac silhouette.      Remote post traumatic deformities of multiple left ribs. Scattered surgical clips within the left axilla.      CT Chest Pulmonary Embolism w Contrast    (Results Pending)          Critical care was not performed.     Medical Decision Making  The patient's presentation was of moderate complexity (an undiagnosed new problem with uncertain diagnosis).    The patient's evaluation involved:  review of external note(s) from 3+ sources (see separate area of note for details)  ordering and/or review of 3+ test(s) in this encounter (CBC, CMP, alcohol, lactate, chest x-ray, EKG)  independent interpretation of testing performed by another health professional (chest x-ray)  discussion of management or test interpretation with another health professional (hospitalist and mental health  detox team)    The patient's management necessitated high risk (a decision regarding hospitalization).    Assessment & Plan    Here in the emergency room patient presenting asking for detox, but initially tachycardic and appeared ill so ordered labs that showed a lactate of 4 which down trended to 2.9 with fluids.  Alcohol level on admission was 0.38.  Initially thought about admitting patient to detox, but given elevated lactate and patient becoming intermittently hypoxic here in the emergency room after he reports that around 10 PM he after being here in the emergency room he became acutely short of breath and was found to be in the 80s by nursing.  Patient placed on 3 L, D-dimer ordered, and VBG ordered.  D-dimer positive so CT PE scan ordered.  Patient however with improving hypoxia, now 93/94% on room air.  Therefore plan to admit patient to medicine observation for detox, elevated lactate likely secondary to alcoholic ketoacidosis, and brief intermittent hypoxia pending CT PE study.  Spoke with inpatient hospitalist who accepted patient for admission.  I have reviewed the nursing notes. I have reviewed the findings, diagnosis, plan and need for follow up with the patient.    New Prescriptions    No medications on file       Final diagnoses:   Alcoholic ketoacidosis   I, Ren Evans, am serving as a trained medical scribe to document services personally performed by Jarvis Romeo MD, based to the provider's statements to me.     I, Jarvis Romeo MD, was physically present and have reviewed and verified the accuracy of this note documented by Ren Evans.       Jarvis Romeo MD  [facility] EMERGENCY DEPARTMENT  4/17/2024     Jarvis Romeo MD  04/17/24 7334

## 2024-04-18 ENCOUNTER — TELEPHONE (OUTPATIENT)
Dept: BEHAVIORAL HEALTH | Facility: CLINIC | Age: 49
End: 2024-04-18

## 2024-04-18 ENCOUNTER — APPOINTMENT (OUTPATIENT)
Dept: CT IMAGING | Facility: CLINIC | Age: 49
End: 2024-04-18
Attending: STUDENT IN AN ORGANIZED HEALTH CARE EDUCATION/TRAINING PROGRAM
Payer: COMMERCIAL

## 2024-04-18 LAB
ALBUMIN SERPL BCG-MCNC: 3.4 G/DL (ref 3.5–5.2)
ALBUMIN UR-MCNC: 30 MG/DL
ALP SERPL-CCNC: 97 U/L (ref 40–150)
ALT SERPL W P-5'-P-CCNC: 75 U/L (ref 0–70)
AMPHETAMINES UR QL SCN: NORMAL
ANION GAP SERPL CALCULATED.3IONS-SCNC: 12 MMOL/L (ref 7–15)
APPEARANCE UR: CLEAR
AST SERPL W P-5'-P-CCNC: 153 U/L (ref 0–45)
ATRIAL RATE - MUSE: 101 BPM
BARBITURATES UR QL SCN: NORMAL
BENZODIAZ UR QL SCN: NORMAL
BILIRUB DIRECT SERPL-MCNC: <0.2 MG/DL (ref 0–0.3)
BILIRUB SERPL-MCNC: 0.3 MG/DL
BILIRUB UR QL STRIP: NEGATIVE
BUN SERPL-MCNC: 9.7 MG/DL (ref 6–20)
BZE UR QL SCN: NORMAL
CALCIUM SERPL-MCNC: 7.7 MG/DL (ref 8.6–10)
CANNABINOIDS UR QL SCN: NORMAL
CHLORIDE SERPL-SCNC: 102 MMOL/L (ref 98–107)
COLOR UR AUTO: ABNORMAL
CREAT SERPL-MCNC: 0.68 MG/DL (ref 0.67–1.17)
DEPRECATED HCO3 PLAS-SCNC: 22 MMOL/L (ref 22–29)
DIASTOLIC BLOOD PRESSURE - MUSE: NORMAL MMHG
EGFRCR SERPLBLD CKD-EPI 2021: >90 ML/MIN/1.73M2
ERYTHROCYTE [DISTWIDTH] IN BLOOD BY AUTOMATED COUNT: 13.8 % (ref 10–15)
FENTANYL UR QL: NORMAL
GLUCOSE SERPL-MCNC: 102 MG/DL (ref 70–99)
GLUCOSE UR STRIP-MCNC: NEGATIVE MG/DL
HCT VFR BLD AUTO: 34.5 % (ref 40–53)
HGB BLD-MCNC: 11.5 G/DL (ref 13.3–17.7)
HGB UR QL STRIP: NEGATIVE
INTERPRETATION ECG - MUSE: NORMAL
KETONES UR STRIP-MCNC: 40 MG/DL
LACTATE SERPL-SCNC: 1.3 MMOL/L (ref 0.7–2)
LEUKOCYTE ESTERASE UR QL STRIP: NEGATIVE
MCH RBC QN AUTO: 32.8 PG (ref 26.5–33)
MCHC RBC AUTO-ENTMCNC: 33.3 G/DL (ref 31.5–36.5)
MCV RBC AUTO: 98 FL (ref 78–100)
MUCOUS THREADS #/AREA URNS LPF: PRESENT /LPF
NITRATE UR QL: NEGATIVE
OPIATES UR QL SCN: NORMAL
P AXIS - MUSE: 80 DEGREES
PCP QUAL URINE (ROCHE): NORMAL
PH UR STRIP: 6.5 [PH] (ref 5–7)
PLATELET # BLD AUTO: 189 10E3/UL (ref 150–450)
POTASSIUM SERPL-SCNC: 3.5 MMOL/L (ref 3.4–5.3)
PR INTERVAL - MUSE: 178 MS
PROT SERPL-MCNC: 6.1 G/DL (ref 6.4–8.3)
QRS DURATION - MUSE: 96 MS
QT - MUSE: 374 MS
QTC - MUSE: 484 MS
R AXIS - MUSE: 67 DEGREES
RBC # BLD AUTO: 3.51 10E6/UL (ref 4.4–5.9)
RBC URINE: 1 /HPF
SODIUM SERPL-SCNC: 136 MMOL/L (ref 135–145)
SP GR UR STRIP: >1.05 (ref 1–1.03)
SYSTOLIC BLOOD PRESSURE - MUSE: NORMAL MMHG
T AXIS - MUSE: 95 DEGREES
UROBILINOGEN UR STRIP-MCNC: NORMAL MG/DL
VENTRICULAR RATE- MUSE: 101 BPM
WBC # BLD AUTO: 2.4 10E3/UL (ref 4–11)
WBC URINE: 1 /HPF

## 2024-04-18 PROCEDURE — 82248 BILIRUBIN DIRECT: CPT | Performed by: INTERNAL MEDICINE

## 2024-04-18 PROCEDURE — 250N000013 HC RX MED GY IP 250 OP 250 PS 637: Performed by: INTERNAL MEDICINE

## 2024-04-18 PROCEDURE — 71275 CT ANGIOGRAPHY CHEST: CPT

## 2024-04-18 PROCEDURE — 81001 URINALYSIS AUTO W/SCOPE: CPT | Performed by: STUDENT IN AN ORGANIZED HEALTH CARE EDUCATION/TRAINING PROGRAM

## 2024-04-18 PROCEDURE — 96376 TX/PRO/DX INJ SAME DRUG ADON: CPT

## 2024-04-18 PROCEDURE — 250N000009 HC RX 250: Performed by: PSYCHIATRY & NEUROLOGY

## 2024-04-18 PROCEDURE — 80307 DRUG TEST PRSMV CHEM ANLYZR: CPT | Performed by: STUDENT IN AN ORGANIZED HEALTH CARE EDUCATION/TRAINING PROGRAM

## 2024-04-18 PROCEDURE — G0378 HOSPITAL OBSERVATION PER HR: HCPCS

## 2024-04-18 PROCEDURE — 85027 COMPLETE CBC AUTOMATED: CPT | Performed by: INTERNAL MEDICINE

## 2024-04-18 PROCEDURE — 80053 COMPREHEN METABOLIC PANEL: CPT | Performed by: INTERNAL MEDICINE

## 2024-04-18 PROCEDURE — 250N000011 HC RX IP 250 OP 636: Performed by: PSYCHIATRY & NEUROLOGY

## 2024-04-18 PROCEDURE — 96375 TX/PRO/DX INJ NEW DRUG ADDON: CPT

## 2024-04-18 PROCEDURE — 250N000013 HC RX MED GY IP 250 OP 250 PS 637: Performed by: STUDENT IN AN ORGANIZED HEALTH CARE EDUCATION/TRAINING PROGRAM

## 2024-04-18 PROCEDURE — 250N000011 HC RX IP 250 OP 636: Performed by: INTERNAL MEDICINE

## 2024-04-18 PROCEDURE — 83605 ASSAY OF LACTIC ACID: CPT | Performed by: STUDENT IN AN ORGANIZED HEALTH CARE EDUCATION/TRAINING PROGRAM

## 2024-04-18 PROCEDURE — HZ2ZZZZ DETOXIFICATION SERVICES FOR SUBSTANCE ABUSE TREATMENT: ICD-10-PCS | Performed by: INTERNAL MEDICINE

## 2024-04-18 PROCEDURE — 36415 COLL VENOUS BLD VENIPUNCTURE: CPT | Performed by: INTERNAL MEDICINE

## 2024-04-18 PROCEDURE — 99222 1ST HOSP IP/OBS MODERATE 55: CPT | Performed by: INTERNAL MEDICINE

## 2024-04-18 RX ORDER — NALOXONE HYDROCHLORIDE 0.4 MG/ML
0.2 INJECTION, SOLUTION INTRAMUSCULAR; INTRAVENOUS; SUBCUTANEOUS
Status: DISCONTINUED | OUTPATIENT
Start: 2024-04-18 | End: 2024-04-24 | Stop reason: HOSPADM

## 2024-04-18 RX ORDER — NALOXONE HYDROCHLORIDE 0.4 MG/ML
0.4 INJECTION, SOLUTION INTRAMUSCULAR; INTRAVENOUS; SUBCUTANEOUS
Status: DISCONTINUED | OUTPATIENT
Start: 2024-04-18 | End: 2024-04-24 | Stop reason: HOSPADM

## 2024-04-18 RX ORDER — AMOXICILLIN 250 MG
2 CAPSULE ORAL 2 TIMES DAILY PRN
Status: DISCONTINUED | OUTPATIENT
Start: 2024-04-18 | End: 2024-04-24 | Stop reason: HOSPADM

## 2024-04-18 RX ORDER — HYDROXYZINE HYDROCHLORIDE 25 MG/1
25 TABLET, FILM COATED ORAL EVERY 4 HOURS PRN
Status: DISCONTINUED | OUTPATIENT
Start: 2024-04-18 | End: 2024-04-23

## 2024-04-18 RX ORDER — TRAZODONE HYDROCHLORIDE 50 MG/1
50 TABLET, FILM COATED ORAL
Status: DISCONTINUED | OUTPATIENT
Start: 2024-04-18 | End: 2024-04-23

## 2024-04-18 RX ORDER — OXYCODONE HYDROCHLORIDE 10 MG/1
10 TABLET ORAL EVERY 4 HOURS PRN
Status: DISCONTINUED | OUTPATIENT
Start: 2024-04-18 | End: 2024-04-24 | Stop reason: HOSPADM

## 2024-04-18 RX ORDER — HYDROMORPHONE HYDROCHLORIDE 1 MG/ML
.3-.5 INJECTION, SOLUTION INTRAMUSCULAR; INTRAVENOUS; SUBCUTANEOUS
Status: DISCONTINUED | OUTPATIENT
Start: 2024-04-18 | End: 2024-04-24 | Stop reason: HOSPADM

## 2024-04-18 RX ORDER — ACETAMINOPHEN 325 MG/1
650 TABLET ORAL EVERY 4 HOURS PRN
Status: DISCONTINUED | OUTPATIENT
Start: 2024-04-18 | End: 2024-04-23

## 2024-04-18 RX ORDER — ESCITALOPRAM OXALATE 10 MG/1
10 TABLET ORAL DAILY
Status: DISCONTINUED | OUTPATIENT
Start: 2024-04-18 | End: 2024-04-24 | Stop reason: HOSPADM

## 2024-04-18 RX ORDER — AMOXICILLIN 250 MG
1 CAPSULE ORAL 2 TIMES DAILY PRN
Status: DISCONTINUED | OUTPATIENT
Start: 2024-04-18 | End: 2024-04-24 | Stop reason: HOSPADM

## 2024-04-18 RX ORDER — ONDANSETRON 4 MG/1
4 TABLET, ORALLY DISINTEGRATING ORAL EVERY 6 HOURS PRN
Status: DISCONTINUED | OUTPATIENT
Start: 2024-04-18 | End: 2024-04-24 | Stop reason: HOSPADM

## 2024-04-18 RX ORDER — ONDANSETRON 2 MG/ML
4 INJECTION INTRAMUSCULAR; INTRAVENOUS EVERY 6 HOURS PRN
Status: DISCONTINUED | OUTPATIENT
Start: 2024-04-18 | End: 2024-04-24 | Stop reason: HOSPADM

## 2024-04-18 RX ORDER — OXYCODONE HYDROCHLORIDE 5 MG/1
5 TABLET ORAL ONCE
Status: COMPLETED | OUTPATIENT
Start: 2024-04-18 | End: 2024-04-18

## 2024-04-18 RX ADMIN — OXYCODONE HYDROCHLORIDE 10 MG: 10 TABLET ORAL at 16:29

## 2024-04-18 RX ADMIN — DIAZEPAM 10 MG: 10 TABLET ORAL at 02:19

## 2024-04-18 RX ADMIN — GABAPENTIN 900 MG: 300 CAPSULE ORAL at 16:29

## 2024-04-18 RX ADMIN — CLONIDINE HYDROCHLORIDE 0.1 MG: 0.1 TABLET ORAL at 16:30

## 2024-04-18 RX ADMIN — SODIUM CHLORIDE 84 ML: 9 INJECTION, SOLUTION INTRAVENOUS at 00:16

## 2024-04-18 RX ADMIN — FOLIC ACID 1 MG: 1 TABLET ORAL at 08:11

## 2024-04-18 RX ADMIN — ACETAMINOPHEN 650 MG: 325 TABLET, FILM COATED ORAL at 04:55

## 2024-04-18 RX ADMIN — HYDROMORPHONE HYDROCHLORIDE 0.5 MG: 1 INJECTION, SOLUTION INTRAMUSCULAR; INTRAVENOUS; SUBCUTANEOUS at 11:25

## 2024-04-18 RX ADMIN — DIAZEPAM 10 MG: 10 TABLET ORAL at 20:32

## 2024-04-18 RX ADMIN — OXYCODONE HYDROCHLORIDE 5 MG: 5 TABLET ORAL at 06:07

## 2024-04-18 RX ADMIN — HYDROMORPHONE HYDROCHLORIDE 0.5 MG: 1 INJECTION, SOLUTION INTRAMUSCULAR; INTRAVENOUS; SUBCUTANEOUS at 15:11

## 2024-04-18 RX ADMIN — THIAMINE HCL TAB 100 MG 100 MG: 100 TAB at 08:11

## 2024-04-18 RX ADMIN — Medication 1 TABLET: at 08:11

## 2024-04-18 RX ADMIN — OXYCODONE HYDROCHLORIDE 10 MG: 10 TABLET ORAL at 12:34

## 2024-04-18 RX ADMIN — GABAPENTIN 900 MG: 300 CAPSULE ORAL at 23:58

## 2024-04-18 RX ADMIN — DIAZEPAM 10 MG: 10 TABLET ORAL at 08:11

## 2024-04-18 RX ADMIN — HYDROMORPHONE HYDROCHLORIDE 0.5 MG: 1 INJECTION, SOLUTION INTRAMUSCULAR; INTRAVENOUS; SUBCUTANEOUS at 09:17

## 2024-04-18 RX ADMIN — DIAZEPAM 10 MG: 10 TABLET ORAL at 03:57

## 2024-04-18 RX ADMIN — ESCITALOPRAM OXALATE 10 MG: 10 TABLET ORAL at 08:11

## 2024-04-18 RX ADMIN — OXYCODONE HYDROCHLORIDE 10 MG: 10 TABLET ORAL at 23:55

## 2024-04-18 RX ADMIN — ACETAMINOPHEN 650 MG: 325 TABLET, FILM COATED ORAL at 20:32

## 2024-04-18 RX ADMIN — CLONIDINE HYDROCHLORIDE 0.1 MG: 0.1 TABLET ORAL at 23:57

## 2024-04-18 RX ADMIN — GABAPENTIN 900 MG: 300 CAPSULE ORAL at 08:11

## 2024-04-18 RX ADMIN — IOPAMIDOL 64 ML: 755 INJECTION, SOLUTION INTRAVENOUS at 00:17

## 2024-04-18 RX ADMIN — HYDROXYZINE HYDROCHLORIDE 25 MG: 25 TABLET, FILM COATED ORAL at 23:57

## 2024-04-18 RX ADMIN — HYDROMORPHONE HYDROCHLORIDE 0.5 MG: 1 INJECTION, SOLUTION INTRAMUSCULAR; INTRAVENOUS; SUBCUTANEOUS at 20:16

## 2024-04-18 RX ADMIN — GABAPENTIN 900 MG: 300 CAPSULE ORAL at 01:09

## 2024-04-18 RX ADMIN — CLONIDINE HYDROCHLORIDE 0.1 MG: 0.1 TABLET ORAL at 01:09

## 2024-04-18 RX ADMIN — CLONIDINE HYDROCHLORIDE 0.1 MG: 0.1 TABLET ORAL at 08:11

## 2024-04-18 ASSESSMENT — ACTIVITIES OF DAILY LIVING (ADL)
ADLS_ACUITY_SCORE: 30
ADLS_ACUITY_SCORE: 30
ADLS_ACUITY_SCORE: 29
ADLS_ACUITY_SCORE: 29
ADLS_ACUITY_SCORE: 30
ADLS_ACUITY_SCORE: 29
ADLS_ACUITY_SCORE: 30
ADLS_ACUITY_SCORE: 29
ADLS_ACUITY_SCORE: 30
ADLS_ACUITY_SCORE: 29
DEPENDENT_IADLS:: INDEPENDENT

## 2024-04-18 NOTE — CONSULTS
4/18/2024  Pt completed his KEENAN CA today. He would like to return to [facility] for I KEENAN treatment. The ADILIA for [facility] was emailed to the unit SW for pt to sign and pt's KEENAN CA was emailed to [facility] today.    [facility] [id number]    Recommendations:   1)  Complete a residential based or similar treatment program.  2)  Abstain from all mood-altering chemicals unless prescribed by a licensed provider.   3)  Attend, at minimum, 2 weekly support group meetings, such as 12 step based (AA/NA), SMART Recovery, Health Realizations, and/or Refuge Recovery meetings.     4)  Actively work with a male mentor/sponsor on a weekly basis.   5)  Follow all the recommendations of your treatment/medical providers.  6)  Remain law abiding and follow all recommendations of the Courts/PO.    Clinical Substantiation:    Pt has a long history of alcohol use. He struggles with applying sober coping skills that he has learned.    Referrals/ Alternatives:  [facility]   Central Access Team - Assessments & Admissions  Central Access Team  Direct # [phone]  Email: [email or web address]  [email or web address]     KEENAN consult completed by: Ambreen Perales [facility].  Phone Number: [phone]  E-mail Address: [email or web address] Mental [email or web address] and Addiction [email or web address] Evaluation Department  [address] [place] [address]     *Due to regulation of Title 42 of the Code of Federal Regulations (CFR) Part 2: Confidentiality laws apply to this note and the information wherein.  Thus, this note cannot be copy and pasted into any other health care staff's note nor can it be included in general medical records sent to ANY outside agency without the patient's written consent.
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
## 2024-04-18 NOTE — PROGRESS NOTES
MEDICINE TRIAGE NOTE:   Contacted by ED provider, Dr. Romeo, to discuss admission to general medicine for this patient.     Per Dr. Romeo and chart review, Paulo Franco is a 48 year old male with PMHx of alcohol use disorder, alcoholic hepatitis, TBI, brachial plexus injury s/p surgery who presented to ED for alcohol detox.     In the ED, his AST/ALT are elevated at 223 and 104, respectively, ethyl alcohol level is elevated at 0.38, lactate initially elevated to 4, which has downtrended with IVF. He had CXR that was unremarkable. U/A, UDS are ordered, but not yet collected. Blood cultures have been collected. He has been started on CIWA protocol.     ED provider requesting admission for alcohol detox. Given lactate is improving, ED provider will discuss with detox unit to see if they can accept patient there rather than patient being admitted to medical unit, since alcohol detox is his primary issue. If patient is not accepted to detox, then ED provider will reach back out via SOC to rediscuss medical admission.     This was signed out to oncoming provider to follow up.     Anisha Short MD  Internal Medicine/Pediatrics Hospitalist   of Internal Medicine and Pediatrics  [facility]

## 2024-04-18 NOTE — PROGRESS NOTES
4/18/2024  I referred pt to IP KEENAN at [facility]. This is the emailed response I received:    Paulo is ineligible for programming as he has an active warrant for felony domestic.    John Holt, [facility] [place] [address]  Phone: [phone]  Fax:    [phone]      Ambreen Suárez MA [facility]  KEENAN Evaluation Counselor  [phone]  [email or web address]

## 2024-04-18 NOTE — PLAN OF CARE
"  Problem: Adult Inpatient Plan of Care  Goal: Plan of Care Review  Description: The Plan of Care Review/Shift note should be completed every shift.  The Outcome Evaluation is a brief statement about your assessment that the patient is improving, declining, or no change.  This information will be displayed automatically on your shift  note.  Outcome: Progressing  Flowsheets (Taken 4/18/2024 1127)  Plan of Care Reviewed With: patient  Overall Patient Progress: no change     Problem: Adult Inpatient Plan of Care  Goal: Plan of Care Review  Description: The Plan of Care Review/Shift note should be completed every shift.  The Outcome Evaluation is a brief statement about your assessment that the patient is improving, declining, or no change.  This information will be displayed automatically on your shift  note.  Outcome: Progressing  Flowsheets (Taken 4/18/2024 1127)  Plan of Care Reviewed With: patient  Overall Patient Progress: no change  Goal: Patient-Specific Goal (Individualized)  Description: You can add care plan individualizations to a care plan. Examples of Individualization might be:  \"Parent requests to be called daily at 9am for status\", \"I have a hard time hearing out of my right ear\", or \"Do not touch me to wake me up as it startles  me\".  Outcome: Progressing  Goal: Absence of Hospital-Acquired Illness or Injury  Outcome: Progressing  Goal: Optimal Comfort and Wellbeing  Outcome: Progressing  Goal: Readiness for Transition of Care  Outcome: Progressing     Problem: Suicide Risk  Goal: Absence of Self-Harm  Outcome: Progressing     Problem: Fall Injury Risk  Goal: Absence of Fall and Fall-Related Injury  Outcome: Progressing     Problem: Alcohol Withdrawal  Goal: Alcohol Withdrawal Symptom Control  Outcome: Progressing  Goal: Optimal Neurologic Function  Outcome: Progressing  Goal: Readiness for Change Identified  Outcome: Progressing     "

## 2024-04-18 NOTE — TELEPHONE ENCOUNTER
Date: 4/18/2024    To: HAMILTON RN    Please call this patient at [phone] to complete a medical screening to clarify his medications and medical condition and to complete a COVID-19 screening.      The patient has a history of:  Pt currently hospitalized for ETOH withdrawal, alcoholic ketoacidosis    INSIDE: This patient had a substance abuse assessment with Ambreen Suárez MA, DAVID , so no paperwork will be sent up to the 6th floor because all of the clinical documentation is in the patient's electronic medical record in [facility].      Thanks,  AYESHA ClemonsC

## 2024-04-18 NOTE — TELEPHONE ENCOUNTER
Pt is still taking valium and narcotic pain meds, writer will reach out to pt and medical team tomorrow

## 2024-04-18 NOTE — PROGRESS NOTES
[facility] Medicine Cross Cover Note    April 18, 2024 4:18 AM    I was notified by APRIL Blunt that this patient is having significant arm pain, doesn't have any options. I reviewed his chart- he had a dose of tylenol earlier but other than that he is mainly on alcohol withdrawal meds. The gabapentin may help his pain as well.     Action taken:   -ordered tylenol  -recommended 2 g limit (given he has mild transaminitis and unclear if he has underlying cirrhosis or if more an alcoholic hepatitis picture).     RN was notified via secure messaging.       Faiza Weinberg MD on 4/18/2024 at 4:18 AM    Addendum:     04/18/24  5:58 AM    I was notified again about severe arm pain (9/10). I put in one time dose of oxycodone 5 mg.     Faiza Weinberg MD

## 2024-04-18 NOTE — PROGRESS NOTES
6MS ADMISSION    D: Patient admitted/transferred from ED via Zoom cart for Alcoholic Ketoacidosis.     I: Upon arrival to the unit patient was oriented to room, unit, and call light. Patient s height, weight, and vital signs were obtained. Allergies reviewed and allergy band applied. Provider notified of patient s arrival on the unit. Adult AVS completed. Head to toe assessment completed. Care plan initiated.    A: Vital signs stable upon admission. Patient rates pain at 9. Two RN skin assessment completed yes. Second RN was Kiki MERCADO Significant Skin Findings include Skin graft on left upper shoulder, scar on abdomen and scabs on bilateral LE.     P: Continue to monitor patient s and intervene as needed. Continue with plan of care. Notify provider with any concerns or changes in patient status.

## 2024-04-18 NOTE — TELEPHONE ENCOUNTER
S: [facility] Nia , Provider Jarvis calling at 8:10 PM   with clinical on a 48 year old/Male presenting for alcohol detox.     B: Pt presents for ETOH detox.   Currently reports drinking pint of hard liquor daily  for Years .    Patient reports last use was last night .  Pt WHITNEY: .38  Pt  denies hx of DT  Pt  denies hx of seizures. Last seizure: N/A  Pt endorsing the following symptoms of withdrawal:  N/A  MSSA Score: N/A     Pt denies acute mental health or medical concerns.   Pt denies other drug use: None Amount/frequency: N/A    Does Pt have a detox care plan in [facility]? NO   Does pt present with specific needs, assistive devices, or exclusionary criteria?  Sling on left arm   Is the patient ambulating, eating and drinking in the ED? Yes    A: Pt meets criteria to be presented for IP detox admission. Patient is voluntary    COVID Symptoms: No  If yes, COVID test required   Utox: Not ordered, intake to request lab   Magnesium: WNL  CMP: Abnormalities: Anion 22, Creatinine .66, Calcium 8.2, Chloride 95, , ,   CBC: Abnormalities: RBC 4.39   HCG: N/A     R: Patient cleared and ready for behavioral bed placement: Yes    Pt is meeting criteria for presentation to 3A/CD    Does Patient need a Transfer Center request created? No, Pt is located within [facility] ED, [facility] ED, or [facility] ED  8:17 PM Paged Wlima to review for 3A/CD/Marissa    8:56 PM Intake called ED to order recheck of Lactic acid and UTOX   9:56 PM pt is accepted to 3A/CD /Marissa   10:21 PM Intake called ED spoke with RN - pt may need medical, she will call back to confirm

## 2024-04-18 NOTE — PROGRESS NOTES
"Shift 2852-6720    VS: /75 (BP Location: Left arm, Patient Position: Semi-Gonzalez's, Cuff Size: Adult Regular)   Pulse 101   Temp 98.7  F (37.1  C) (Oral)   Resp 20   Ht 1.803 m (5' 11\")   Wt 84.1 kg (185 lb 6.4 oz)   SpO2 97%   BMI 25.86 kg/m     O2: SpO2 >90% on 4L NC. Denies chest pain and SOB.    Output: Voids spontaneously without difficulty to bathroom.   Last BM: 04/17.    Activity: SBA   Skin: WDL except skin graft on left upper shoulder, scar on abdomen and scabs on bilateral LE.    Pain: Pain managed with PRN tylenol, 5mg Oxycodone and scheduled Gabapentin.    CMS: Intact, AOx4. Reports numbness and tingling r/t withdrawal   Dressing: N/A   Diet: Regular diet. Reports nausea managed with Valium given for withdrawal symptoms.    LDA: L PIV SL and patent.    Equipment: IV pole, personal belongings, and call light in reach.    Plan: Continue with plan of care. Call light within reach, pt able to make needs known.    Additional Info: On CIWA protocol.      Q4H Obs. Goals   In progress - diagnostic tests and consults completed and resulted  In progress -vital signs normal or at patient baseline  Met -tolerating oral intake to maintain hydration  In progress -adequate pain control on oral analgesics  In progress -returns to baseline functional status  In progress -safe disposition plan has been identified    Q4H Obs. Goals   In progress - diagnostic tests and consults completed and resulted  In progress   -vital signs normal or at patient baseline  Met -tolerating oral intake to maintain hydration  In progress -adequate pain control on oral analgesics  In progress -returns to baseline functional status  In progress -safe disposition plan has been identified  "
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
## 2024-04-18 NOTE — PROGRESS NOTES
"   VS: /72 (BP Location: Right arm)   Pulse 89   Temp 98.3  F (36.8  C) (Oral)   Resp 18   Ht 1.803 m (5' 11\")   Wt 84.1 kg (185 lb 6.4 oz)   SpO2 97%   BMI 25.86 kg/m     O2: 97% RA, denies SOB   Output: Continent of bowel and bladder   Last BM: 4/17 per pt   Activity: SBA   Skin: Intact ex L shoulder, previous shoulder graft surgery. Incision is open to air   Pain: Endorses surgical site pain, managed with PRN pain medication (see MAR)              CMS: A&Ox4, denies chest pain, n/v, numbness/tingling and dizziness. Endorses decreases sensation to L arm post surgery   Dressing: Sling in place to L arm   Diet: Regular   LDA: R PIV SL   Equipment: Personal belongings   Plan: Call light in reach, continue POC    Additional Info:          "

## 2024-04-18 NOTE — ED NOTES
Pt complaining of SOB that began an hour ago, writer took VS, on RA pt satting between 87-90%. Pt placed on 4L O2 via NC and pulse ox in HW. Pt now satting around 96%. MD made aware.

## 2024-04-18 NOTE — TELEPHONE ENCOUNTER
4/18/2024  Pt is currently on UR6MS. His first choice for IP KEENAN is [facility], however, he has recently been there. His second choice is [facility].    LP SCREEN TELEPHONE NOTE   Paulo Franco [facility] and the patient was deemed ELIGIBLE for the LP program.     Inpatient or Community Referral: Inpatient referral     Medical Screening (As Needed): needs a screening     Regular use of benzodiazapine's (other than detox): The patient is ONLY using benzodiazepines while on the detox unit or other medical or mental health unit.     Insurance: The [facility] Department is responsible for obtaining ALL authorizations for treatment services at the EOP, DOP and LP levels of care.      This will be a: Seen by a [facility] Evaluation Counselor: VALERIY, ISP & LP UPDATE NOTE evaluation. (Update SBAR and route DAANES and ADILIA's)     IV use or Pregnant: This patient is not pregnant or an IV drug user.     Business office: [phone]     List: This patient CAN NOT be placed on the PRIORITY LP Waiting List until after being medically approved for the LP program by the LP RN.       Group: Men's Group or Mixed Group     Additional Info as needed: NA     The best current contact telephone number for the patient is: [phone]  Unit SW: Bessie Matute

## 2024-04-19 PROBLEM — R06.02 SHORTNESS OF BREATH: Status: ACTIVE | Noted: 2024-04-19

## 2024-04-19 PROBLEM — F10.288 ALCOHOL DEPENDENCE WITH OTHER ALCOHOL-INDUCED DISORDER (H): Status: ACTIVE | Noted: 2024-04-19

## 2024-04-19 PROBLEM — R09.02 HYPOXIA: Status: ACTIVE | Noted: 2024-04-19
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
## 2024-04-19 LAB

## 2024-04-19 PROCEDURE — 99232 SBSQ HOSP IP/OBS MODERATE 35: CPT | Performed by: INTERNAL MEDICINE

## 2024-04-19 PROCEDURE — 250N000013 HC RX MED GY IP 250 OP 250 PS 637: Performed by: INTERNAL MEDICINE

## 2024-04-19 PROCEDURE — 99207 PR NO BILLABLE SERVICE THIS VISIT: CPT | Performed by: STUDENT IN AN ORGANIZED HEALTH CARE EDUCATION/TRAINING PROGRAM

## 2024-04-19 PROCEDURE — 87633 RESP VIRUS 12-25 TARGETS: CPT | Performed by: INTERNAL MEDICINE

## 2024-04-19 PROCEDURE — 120N000002 HC R&B MED SURG/OB UMMC

## 2024-04-19 PROCEDURE — 250N000013 HC RX MED GY IP 250 OP 250 PS 637: Performed by: STUDENT IN AN ORGANIZED HEALTH CARE EDUCATION/TRAINING PROGRAM

## 2024-04-19 PROCEDURE — 250N000011 HC RX IP 250 OP 636: Performed by: INTERNAL MEDICINE

## 2024-04-19 PROCEDURE — 96376 TX/PRO/DX INJ SAME DRUG ADON: CPT

## 2024-04-19 PROCEDURE — 87581 M.PNEUMON DNA AMP PROBE: CPT | Performed by: INTERNAL MEDICINE

## 2024-04-19 PROCEDURE — 87635 SARS-COV-2 COVID-19 AMP PRB: CPT | Performed by: STUDENT IN AN ORGANIZED HEALTH CARE EDUCATION/TRAINING PROGRAM

## 2024-04-19 PROCEDURE — G0378 HOSPITAL OBSERVATION PER HR: HCPCS

## 2024-04-19 RX ORDER — OSELTAMIVIR PHOSPHATE 75 MG/1
75 CAPSULE ORAL 2 TIMES DAILY
Qty: 10 CAPSULE | Refills: 0 | Status: DISCONTINUED | OUTPATIENT
Start: 2024-04-19 | End: 2024-04-24 | Stop reason: HOSPADM

## 2024-04-19 RX ORDER — GABAPENTIN 300 MG/1
300 CAPSULE ORAL 2 TIMES DAILY
Status: ON HOLD | COMMUNITY
End: 2024-04-24

## 2024-04-19 RX ORDER — PANTOPRAZOLE SODIUM 40 MG/1
40 TABLET, DELAYED RELEASE ORAL
Status: DISCONTINUED | OUTPATIENT
Start: 2024-04-20 | End: 2024-04-24 | Stop reason: HOSPADM

## 2024-04-19 RX ORDER — ESCITALOPRAM OXALATE 10 MG/1
10 TABLET ORAL DAILY
Status: ON HOLD | COMMUNITY
End: 2024-04-24

## 2024-04-19 RX ORDER — ASPIRIN 81 MG/1
81 TABLET, CHEWABLE ORAL DAILY
Status: ON HOLD | COMMUNITY
End: 2024-04-24

## 2024-04-19 RX ORDER — GABAPENTIN 300 MG/1
300-600 CAPSULE ORAL AT BEDTIME
Status: ON HOLD | COMMUNITY
End: 2024-04-24

## 2024-04-19 RX ADMIN — GABAPENTIN 900 MG: 300 CAPSULE ORAL at 08:27

## 2024-04-19 RX ADMIN — DIAZEPAM 10 MG: 10 TABLET ORAL at 20:06

## 2024-04-19 RX ADMIN — DIAZEPAM 10 MG: 10 TABLET ORAL at 15:33

## 2024-04-19 RX ADMIN — ACETAMINOPHEN 650 MG: 325 TABLET, FILM COATED ORAL at 08:27

## 2024-04-19 RX ADMIN — ONDANSETRON 4 MG: 4 TABLET, ORALLY DISINTEGRATING ORAL at 20:06

## 2024-04-19 RX ADMIN — HYDROMORPHONE HYDROCHLORIDE 0.5 MG: 1 INJECTION, SOLUTION INTRAMUSCULAR; INTRAVENOUS; SUBCUTANEOUS at 08:27

## 2024-04-19 RX ADMIN — ESCITALOPRAM OXALATE 10 MG: 10 TABLET ORAL at 08:27

## 2024-04-19 RX ADMIN — ACETAMINOPHEN 650 MG: 325 TABLET, FILM COATED ORAL at 20:05

## 2024-04-19 RX ADMIN — DIAZEPAM 10 MG: 10 TABLET ORAL at 09:00

## 2024-04-19 RX ADMIN — Medication 1 TABLET: at 08:27

## 2024-04-19 RX ADMIN — GABAPENTIN 900 MG: 300 CAPSULE ORAL at 16:31

## 2024-04-19 RX ADMIN — HYDROMORPHONE HYDROCHLORIDE 0.5 MG: 1 INJECTION, SOLUTION INTRAMUSCULAR; INTRAVENOUS; SUBCUTANEOUS at 16:32

## 2024-04-19 RX ADMIN — HYDROMORPHONE HYDROCHLORIDE 0.5 MG: 1 INJECTION, SOLUTION INTRAMUSCULAR; INTRAVENOUS; SUBCUTANEOUS at 04:16

## 2024-04-19 RX ADMIN — ACETAMINOPHEN 650 MG: 325 TABLET, FILM COATED ORAL at 04:15

## 2024-04-19 RX ADMIN — OSELTAMIVIR PHOSPHATE 75 MG: 75 CAPSULE ORAL at 16:31

## 2024-04-19 RX ADMIN — CLONIDINE HYDROCHLORIDE 0.1 MG: 0.1 TABLET ORAL at 08:27

## 2024-04-19 RX ADMIN — ACETAMINOPHEN 650 MG: 325 TABLET, FILM COATED ORAL at 15:33

## 2024-04-19 RX ADMIN — HYDROMORPHONE HYDROCHLORIDE 0.5 MG: 1 INJECTION, SOLUTION INTRAMUSCULAR; INTRAVENOUS; SUBCUTANEOUS at 20:07

## 2024-04-19 RX ADMIN — OXYCODONE HYDROCHLORIDE 10 MG: 10 TABLET ORAL at 11:39

## 2024-04-19 RX ADMIN — THIAMINE HCL TAB 100 MG 100 MG: 100 TAB at 08:27

## 2024-04-19 RX ADMIN — CLONIDINE HYDROCHLORIDE 0.1 MG: 0.1 TABLET ORAL at 16:31

## 2024-04-19 RX ADMIN — DIAZEPAM 10 MG: 10 TABLET ORAL at 12:06

## 2024-04-19 RX ADMIN — FOLIC ACID 1 MG: 1 TABLET ORAL at 08:27

## 2024-04-19 ASSESSMENT — ACTIVITIES OF DAILY LIVING (ADL)
DIFFICULTY_COMMUNICATING: NO
ADLS_ACUITY_SCORE: 31
ADLS_ACUITY_SCORE: 33
ADLS_ACUITY_SCORE: 33
DIFFICULTY_EATING/SWALLOWING: NO
ADLS_ACUITY_SCORE: 33
ADLS_ACUITY_SCORE: 34
ADLS_ACUITY_SCORE: 33
TOILETING_ISSUES: NO
FALL_HISTORY_WITHIN_LAST_SIX_MONTHS: NO
DRESSING/BATHING: BATHING DIFFICULTY, ASSISTANCE 1 PERSON
CONCENTRATING,_REMEMBERING_OR_MAKING_DECISIONS_DIFFICULTY: NO
ADLS_ACUITY_SCORE: 33
ADLS_ACUITY_SCORE: 34
ADLS_ACUITY_SCORE: 33
ADLS_ACUITY_SCORE: 33
DRESSING/BATHING_DIFFICULTY: YES
ADLS_ACUITY_SCORE: 33
ADLS_ACUITY_SCORE: 33
WEAR_GLASSES_OR_BLIND: NO
WALKING_OR_CLIMBING_STAIRS_DIFFICULTY: NO
ADLS_ACUITY_SCORE: 33
ADLS_ACUITY_SCORE: 33
CHANGE_IN_FUNCTIONAL_STATUS_SINCE_ONSET_OF_CURRENT_ILLNESS/INJURY: NO
ADLS_ACUITY_SCORE: 33
ADLS_ACUITY_SCORE: 33
DOING_ERRANDS_INDEPENDENTLY_DIFFICULTY: YES
ADLS_ACUITY_SCORE: 33
ADLS_ACUITY_SCORE: 33

## 2024-04-19 NOTE — PLAN OF CARE
"[id number]    Plan of Care Reviewed With: patient    Overall Patient Progress: no change    Outcome Evaluation: Pt is A & O x4 on 0.5 L NC. Pt c/o 8/10 LUE pain - administered PRN pain medications. Denies nausea, chest pain & SOB. Pt has new R PIV - SL. Pt refused full skin assessment, known R shoulder previous surgical site with skin graft open to air with sling in place. Pt is independent, voids spontaneously & uses call light appropriately.     Shift Updates  CIWA score at 0838 was 8 - per orders, administered PRN Valium.  CIWA re-score at 1040 was 0 as pt was sleeping.  CIWA score at 1202 was 11 - per orders, administered PRN Valium.  CIWA re-score at 1405 was 0 as pt was sleeping.  CIWA score at 1530 was 12 - per orders, administered PRN Valium.  CIWA score at 1732 was 0 as pt was sleeping.  1155 - Pt has fever of 100.4. Unable to administer PRN Tylenol at this time as it is not yet due - MD notified & aware.  Respiratory PCR came back positive for influenza B - MD notified & aware. Pt placed on droplet precautions.  Pt lost IV access towards beginning of shift - RN flyer contacted & new R PIV placed.    Vitals: /76 (BP Location: Right arm)   Pulse 103   Temp 99.9  F (37.7  C) (Oral)   Resp 16   Ht 1.803 m (5' 11\")   Wt 84.1 kg (185 lb 6.4 oz)   SpO2 92%   BMI 25.86 kg/m      Plan: TBD. Continue w/ plan of care.   "

## 2024-04-19 NOTE — PROGRESS NOTES
" Cross cover Note     Paged at 0430 for fever to 102. Reviewed chart. Vitals otherwise ok. Alcohol use disorder here desiring sobriety and support. RN notes CIWA scores have been low. Per H&P he had some shortness of breath and had been coughing up sputum lately. CXR and CTA in the ED did not show concerns for pneumonia - both showed possible streaks of atelectasis. Though he was very dry on admission as evidenced by high spec grav on urine - so possible he has a pneumonia that has now \"blossomed\".     White count has dropped since admission 4.5==> 2.4 (all cell lines dropped, likely came in concentrated). Lactate fell without antibiotics. BCX NGTD    Overall suspect lung most likely source of infection. Will test for COVID, flu. If negative, would consider antibiotics but will defer to day team evaluation as they will be here in 2 hours.     Juliana Delgado MD      "
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
## 2024-04-19 NOTE — PLAN OF CARE
[id number]    Patient is A&O x4. Call light within reach. Able to make needs known effectively. Independent in the room. Voids spontaneously to the bathroom. LBM: 04/18 per patient.    On continuous O2 at 1LPM via NC. Regular diet. PIV on R lower forearm, SL. C/o LUE pain, PRN Dilaudid and PRN Oxy given. Denies SOB, chest pain and n/v. Reports numbness and tingling in all extremities. Infrequent dry, congested cough noted.     CIWA scoring done per protocol and when awake. Patient scores subjectively and would ask for Valium even if he haven't scored yet.     0410-patient spiked fever at 102.4F, PRN tylenol given. Provider notified. Covid PCR swab collected and sent to lab.     Frequent checks done. Bed alarm on. Continue with POC.

## 2024-04-19 NOTE — CONSULTS
Met with patient discussed referral status at [facility] and [facility].  Agreed to additional referrals to [facility], [facility], and [facility]. Referrals have been made at this time.     [place] Access Team for Referrals  Phone: [phone]  Fax: [phone]  [email or web address]    [facility]  [address], [place] [address]  Phone: [phone]  [email or web address]     [facility]  [phone]  [address] [place] [address]    DAVID Garcia on 4/19/2024 at 1:23 PM
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
## 2024-04-20 PROCEDURE — 99232 SBSQ HOSP IP/OBS MODERATE 35: CPT | Performed by: INTERNAL MEDICINE

## 2024-04-20 PROCEDURE — 120N000002 HC R&B MED SURG/OB UMMC

## 2024-04-20 PROCEDURE — 250N000013 HC RX MED GY IP 250 OP 250 PS 637: Performed by: STUDENT IN AN ORGANIZED HEALTH CARE EDUCATION/TRAINING PROGRAM

## 2024-04-20 PROCEDURE — 250N000013 HC RX MED GY IP 250 OP 250 PS 637: Performed by: INTERNAL MEDICINE

## 2024-04-20 PROCEDURE — 250N000011 HC RX IP 250 OP 636: Performed by: INTERNAL MEDICINE

## 2024-04-20 RX ORDER — CODEINE PHOSPHATE AND GUAIFENESIN 10; 100 MG/5ML; MG/5ML
10 SOLUTION ORAL EVERY 4 HOURS PRN
Status: DISCONTINUED | OUTPATIENT
Start: 2024-04-20 | End: 2024-04-24 | Stop reason: HOSPADM

## 2024-04-20 RX ADMIN — GABAPENTIN 900 MG: 300 CAPSULE ORAL at 08:23

## 2024-04-20 RX ADMIN — FOLIC ACID 1 MG: 1 TABLET ORAL at 08:23

## 2024-04-20 RX ADMIN — HYDROXYZINE HYDROCHLORIDE 25 MG: 25 TABLET, FILM COATED ORAL at 00:08

## 2024-04-20 RX ADMIN — OXYCODONE HYDROCHLORIDE 10 MG: 10 TABLET ORAL at 00:08

## 2024-04-20 RX ADMIN — OSELTAMIVIR PHOSPHATE 75 MG: 75 CAPSULE ORAL at 20:22

## 2024-04-20 RX ADMIN — OXYCODONE HYDROCHLORIDE 10 MG: 10 TABLET ORAL at 20:21

## 2024-04-20 RX ADMIN — OXYCODONE HYDROCHLORIDE 10 MG: 10 TABLET ORAL at 11:03

## 2024-04-20 RX ADMIN — DIAZEPAM 10 MG: 10 TABLET ORAL at 00:08

## 2024-04-20 RX ADMIN — Medication 1 TABLET: at 08:23

## 2024-04-20 RX ADMIN — CLONIDINE HYDROCHLORIDE 0.1 MG: 0.1 TABLET ORAL at 08:23

## 2024-04-20 RX ADMIN — OXYCODONE HYDROCHLORIDE 10 MG: 10 TABLET ORAL at 16:20

## 2024-04-20 RX ADMIN — OXYCODONE HYDROCHLORIDE 10 MG: 10 TABLET ORAL at 07:03

## 2024-04-20 RX ADMIN — CLONIDINE HYDROCHLORIDE 0.1 MG: 0.1 TABLET ORAL at 00:08

## 2024-04-20 RX ADMIN — THIAMINE HCL TAB 100 MG 100 MG: 100 TAB at 08:23

## 2024-04-20 RX ADMIN — OSELTAMIVIR PHOSPHATE 75 MG: 75 CAPSULE ORAL at 08:23

## 2024-04-20 RX ADMIN — HYDROMORPHONE HYDROCHLORIDE 0.5 MG: 1 INJECTION, SOLUTION INTRAMUSCULAR; INTRAVENOUS; SUBCUTANEOUS at 21:49

## 2024-04-20 RX ADMIN — PANTOPRAZOLE SODIUM 40 MG: 40 TABLET, DELAYED RELEASE ORAL at 08:23

## 2024-04-20 RX ADMIN — HYDROMORPHONE HYDROCHLORIDE 0.5 MG: 1 INJECTION, SOLUTION INTRAMUSCULAR; INTRAVENOUS; SUBCUTANEOUS at 18:18

## 2024-04-20 RX ADMIN — ESCITALOPRAM OXALATE 10 MG: 10 TABLET ORAL at 08:23

## 2024-04-20 RX ADMIN — CLONIDINE HYDROCHLORIDE 0.1 MG: 0.1 TABLET ORAL at 16:20

## 2024-04-20 RX ADMIN — GABAPENTIN 900 MG: 300 CAPSULE ORAL at 00:08

## 2024-04-20 ASSESSMENT — ACTIVITIES OF DAILY LIVING (ADL)
ADLS_ACUITY_SCORE: 32
ADLS_ACUITY_SCORE: 33
ADLS_ACUITY_SCORE: 32
ADLS_ACUITY_SCORE: 33
ADLS_ACUITY_SCORE: 32
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
## 2024-04-20 NOTE — PHARMACY-ADMISSION MEDICATION HISTORY
Pharmacist Admission Medication History    Admission medication history is complete. The information provided in this note is only as accurate as the sources available at the time of the update.    Information Source(s): Hospital records and CareEverywhere/[facility] via N/A    Pertinent Information: Was not able to speak with patient about PTA meds. Attempted several times but patient asleep. Completed med history based on fill history.     Changes made to PTA medication list:  Added: aspirin, gabapentin, escitalopram  Deleted: acamprosate, trazodone - not filled in last year   Changed: None    Allergies reviewed with patient and updates made in EHR: no    Medication History Completed By: Vazquez Sher [facility] 4/19/2024 9:11 PM    PTA Med List   Medication Sig Last Dose    aspirin (ASA) 81 MG chewable tablet Take 81 mg by mouth daily Unknown    escitalopram (LEXAPRO) 10 MG tablet Take 10 mg by mouth daily Unknown    folic acid (FOLVITE) 1 MG tablet Take 1 tablet (1 mg) by mouth daily Unknown    gabapentin (NEURONTIN) 300 MG capsule Take 300 mg by mouth 2 times daily 300 mg in AM, 300 mg in PM and 300-600 mg at bedtime Unknown    gabapentin (NEURONTIN) 300 MG capsule Take 300-600 mg by mouth at bedtime 300 mg in AM, 300 mg in PM and 300-600 mg at bedtime Unknown    hydrOXYzine (ATARAX) 25 MG tablet Take 1 tablet (25 mg) by mouth every 4 hours as needed for anxiety Unknown    multivitamin w/minerals (THERA-VIT-M) tablet Take 1 tablet by mouth daily Unknown    pantoprazole (PROTONIX) 40 MG EC tablet Take 1 tablet (40 mg) by mouth daily Unknown    thiamine (B-1) 100 MG tablet Take 1 tablet (100 mg) by mouth daily Unknown

## 2024-04-20 NOTE — PLAN OF CARE
"[id number]    Plan of Care Reviewed With: patient    Overall Patient Progress: no change    Outcome Evaluation: Pt is A & O x4 on 3L of O2 via NC. Pt c/o 8/10 LUE pain - administered PRN pain medications. Denies nausea, chest pain & SOB. Pt has R PIV - SL. Pt refused full skin assessment, known R shoulder previous surgical site with skin graft open to air with sling in place. Pt is independent, voids spontaneously & uses call light appropriately. Droplet precautions maintained throughout shift.    Shift Updates  CIWA score at 0825,1230 & 1627 was 6 - per order, no PRN medication administered.  1609 - Writer entered pt's room to obtain VS as pt was getting back into bed after utilizing the restroom. Pt sating at 86% on RA and was subsequently placed on 3L of O2 to maintain sats around 94%. MD notified & aware. Pt also requesting PRN cough medication.    Vitals: /64 (BP Location: Right arm, Patient Position: Semi-Gonzalez's, Cuff Size: Adult Regular)   Pulse 95   Temp 99  F (37.2  C) (Oral)   Resp 16   Ht 1.803 m (5' 11\")   Wt 84.1 kg (185 lb 6.4 oz)   SpO2 97%   BMI 25.86 kg/m      Plan: TBD. Continue w/ plan of care.       "

## 2024-04-20 NOTE — PLAN OF CARE
"Goal Outcome Evaluation:      Plan of Care Reviewed With: patient    Overall Patient Progress: no change    Outcome Evaluation: pt rating pain 9/10 on L shoulder and generalized body sore. pt on CIWA protocol. no change overnight and pt slept most of the shift.      Problem: Adult Inpatient Plan of Care  Goal: Plan of Care Review  Description: The Plan of Care Review/Shift note should be completed every shift.  The Outcome Evaluation is a brief statement about your assessment that the patient is improving, declining, or no change.  This information will be displayed automatically on your shift  note.  Outcome: Progressing  Flowsheets (Taken 4/20/2024 0658)  Outcome Evaluation: pt rating pain 9/10 on L shoulder and generalized body sore. pt on CIWA protocol. no change overnight and pt slept most of the shift.  Plan of Care Reviewed With: patient  Overall Patient Progress: no change  Goal: Patient-Specific Goal (Individualized)  Description: You can add care plan individualizations to a care plan. Examples of Individualization might be:  \"Parent requests to be called daily at 9am for status\", \"I have a hard time hearing out of my right ear\", or \"Do not touch me to wake me up as it startles  me\".  Outcome: Progressing  Goal: Absence of Hospital-Acquired Illness or Injury  Outcome: Progressing  Intervention: Identify and Manage Fall Risk  Recent Flowsheet Documentation  Taken 4/19/2024 1945 by Kimberli Bryan RN  Safety Promotion/Fall Prevention:   assistive device/personal items within reach   lighting adjusted   clutter free environment maintained   nonskid shoes/slippers when out of bed   room near nurse's station   safety round/check completed  Intervention: Prevent Skin Injury  Recent Flowsheet Documentation  Taken 4/19/2024 1945 by Kimberli Bryan RN  Body Position: position changed independently  Goal: Optimal Comfort and Wellbeing  Outcome: Progressing  Goal: Readiness for Transition of Care  Outcome: " Progressing     Problem: Suicide Risk  Goal: Absence of Self-Harm  Outcome: Progressing  Intervention: Assess Risk to Self and Maintain Safety  Recent Flowsheet Documentation  Taken 4/19/2024 1945 by Kimberli Bryan RN  Enhanced Safety Measures: room near unit station     Problem: Fall Injury Risk  Goal: Absence of Fall and Fall-Related Injury  Outcome: Progressing  Intervention: Identify and Manage Contributors  Recent Flowsheet Documentation  Taken 4/19/2024 1945 by Kimberli Bryan RN  Medication Review/Management: medications reviewed  Intervention: Promote Injury-Free Environment  Recent Flowsheet Documentation  Taken 4/19/2024 1945 by Kimberli Bryan RN  Safety Promotion/Fall Prevention:   assistive device/personal items within reach   lighting adjusted   clutter free environment maintained   nonskid shoes/slippers when out of bed   room near nurse's station   safety round/check completed     Problem: Alcohol Withdrawal  Goal: Alcohol Withdrawal Symptom Control  Outcome: Progressing  Goal: Optimal Neurologic Function  Outcome: Progressing  Goal: Readiness for Change Identified  Outcome: Progressing

## 2024-04-21 ENCOUNTER — APPOINTMENT (OUTPATIENT)
Dept: GENERAL RADIOLOGY | Facility: CLINIC | Age: 49
End: 2024-04-21
Attending: INTERNAL MEDICINE
Payer: COMMERCIAL

## 2024-04-21 PROCEDURE — 250N000013 HC RX MED GY IP 250 OP 250 PS 637: Performed by: STUDENT IN AN ORGANIZED HEALTH CARE EDUCATION/TRAINING PROGRAM

## 2024-04-21 PROCEDURE — 250N000011 HC RX IP 250 OP 636: Performed by: INTERNAL MEDICINE

## 2024-04-21 PROCEDURE — 120N000002 HC R&B MED SURG/OB UMMC

## 2024-04-21 PROCEDURE — 250N000013 HC RX MED GY IP 250 OP 250 PS 637: Performed by: INTERNAL MEDICINE

## 2024-04-21 PROCEDURE — 99233 SBSQ HOSP IP/OBS HIGH 50: CPT | Performed by: INTERNAL MEDICINE

## 2024-04-21 PROCEDURE — 71045 X-RAY EXAM CHEST 1 VIEW: CPT

## 2024-04-21 PROCEDURE — 71045 X-RAY EXAM CHEST 1 VIEW: CPT | Mod: 26 | Performed by: RADIOLOGY

## 2024-04-21 PROCEDURE — 99207 PR NO BILLABLE SERVICE THIS VISIT: CPT | Performed by: INTERNAL MEDICINE

## 2024-04-21 PROCEDURE — 258N000003 HC RX IP 258 OP 636: Performed by: PHYSICIAN ASSISTANT

## 2024-04-21 PROCEDURE — 258N000003 HC RX IP 258 OP 636: Performed by: INTERNAL MEDICINE

## 2024-04-21 RX ADMIN — OSELTAMIVIR PHOSPHATE 75 MG: 75 CAPSULE ORAL at 19:32

## 2024-04-21 RX ADMIN — OXYCODONE HYDROCHLORIDE 10 MG: 10 TABLET ORAL at 00:37

## 2024-04-21 RX ADMIN — GABAPENTIN 600 MG: 300 CAPSULE ORAL at 16:13

## 2024-04-21 RX ADMIN — CLONIDINE HYDROCHLORIDE 0.1 MG: 0.1 TABLET ORAL at 08:45

## 2024-04-21 RX ADMIN — GABAPENTIN 600 MG: 300 CAPSULE ORAL at 08:45

## 2024-04-21 RX ADMIN — SODIUM CHLORIDE, POTASSIUM CHLORIDE, SODIUM LACTATE AND CALCIUM CHLORIDE 500 ML: 600; 310; 30; 20 INJECTION, SOLUTION INTRAVENOUS at 19:34

## 2024-04-21 RX ADMIN — FOLIC ACID 1 MG: 1 TABLET ORAL at 07:47

## 2024-04-21 RX ADMIN — CLONIDINE HYDROCHLORIDE 0.1 MG: 0.1 TABLET ORAL at 00:37

## 2024-04-21 RX ADMIN — ACETAMINOPHEN 650 MG: 325 TABLET, FILM COATED ORAL at 00:37

## 2024-04-21 RX ADMIN — OXYCODONE HYDROCHLORIDE 10 MG: 10 TABLET ORAL at 19:46

## 2024-04-21 RX ADMIN — OXYCODONE HYDROCHLORIDE 10 MG: 10 TABLET ORAL at 07:46

## 2024-04-21 RX ADMIN — OXYCODONE HYDROCHLORIDE 10 MG: 10 TABLET ORAL at 14:19

## 2024-04-21 RX ADMIN — HYDROMORPHONE HYDROCHLORIDE 0.5 MG: 1 INJECTION, SOLUTION INTRAMUSCULAR; INTRAVENOUS; SUBCUTANEOUS at 21:49

## 2024-04-21 RX ADMIN — Medication 1 TABLET: at 07:46

## 2024-04-21 RX ADMIN — SODIUM CHLORIDE, POTASSIUM CHLORIDE, SODIUM LACTATE AND CALCIUM CHLORIDE 500 ML: 600; 310; 30; 20 INJECTION, SOLUTION INTRAVENOUS at 18:52

## 2024-04-21 RX ADMIN — GABAPENTIN 600 MG: 300 CAPSULE ORAL at 00:37

## 2024-04-21 RX ADMIN — OSELTAMIVIR PHOSPHATE 75 MG: 75 CAPSULE ORAL at 07:46

## 2024-04-21 RX ADMIN — PANTOPRAZOLE SODIUM 40 MG: 40 TABLET, DELAYED RELEASE ORAL at 07:46

## 2024-04-21 RX ADMIN — THIAMINE HCL TAB 100 MG 100 MG: 100 TAB at 07:47

## 2024-04-21 RX ADMIN — ESCITALOPRAM OXALATE 10 MG: 10 TABLET ORAL at 07:46

## 2024-04-21 RX ADMIN — HYDROMORPHONE HYDROCHLORIDE 0.5 MG: 1 INJECTION, SOLUTION INTRAMUSCULAR; INTRAVENOUS; SUBCUTANEOUS at 15:32

## 2024-04-21 RX ADMIN — HYDROMORPHONE HYDROCHLORIDE 0.5 MG: 1 INJECTION, SOLUTION INTRAMUSCULAR; INTRAVENOUS; SUBCUTANEOUS at 10:47

## 2024-04-21 ASSESSMENT — ACTIVITIES OF DAILY LIVING (ADL)
ADLS_ACUITY_SCORE: 32

## 2024-04-21 NOTE — TELEPHONE ENCOUNTER
Pt is still taking narcotic pain meds and is requiring O2 after just getting out of bed and going to the bathroom. Pt is not yet medically stable and is not yet approved to admit to LP      Also will need to consult with infection prevent based on his 4/19 positive influenza result
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
## 2024-04-21 NOTE — PLAN OF CARE
"Goal Outcome Evaluation:      Plan of Care Reviewed With: patient    Overall Patient Progress: no change    Outcome Evaluation: pt on CIWA, but not scoring. no valium given during this shift. pt co of L shoulder pain. pt cont to ask for pain med. no changes overnight.      Problem: Adult Inpatient Plan of Care  Goal: Plan of Care Review  Description: The Plan of Care Review/Shift note should be completed every shift.  The Outcome Evaluation is a brief statement about your assessment that the patient is improving, declining, or no change.  This information will be displayed automatically on your shift  note.  Outcome: Progressing  Flowsheets (Taken 4/21/2024 0551)  Outcome Evaluation: pt on CIWA, but not scoring. no valium given during this shift. pt co of L shoulder pain. pt cont to ask for pain med. no changes overnight.  Plan of Care Reviewed With: patient  Overall Patient Progress: no change  Goal: Patient-Specific Goal (Individualized)  Description: You can add care plan individualizations to a care plan. Examples of Individualization might be:  \"Parent requests to be called daily at 9am for status\", \"I have a hard time hearing out of my right ear\", or \"Do not touch me to wake me up as it startles  me\".  Outcome: Progressing  Goal: Absence of Hospital-Acquired Illness or Injury  Outcome: Progressing  Intervention: Identify and Manage Fall Risk  Recent Flowsheet Documentation  Taken 4/20/2024 2021 by Kimberli Bryan RN  Safety Promotion/Fall Prevention:   assistive device/personal items within reach   lighting adjusted   clutter free environment maintained   nonskid shoes/slippers when out of bed   room near nurse's station   safety round/check completed  Intervention: Prevent Skin Injury  Recent Flowsheet Documentation  Taken 4/20/2024 2021 by Kimberli Bryan RN  Body Position: position changed independently  Goal: Optimal Comfort and Wellbeing  Outcome: Progressing  Goal: Readiness for Transition of " Care  Outcome: Progressing     Problem: Suicide Risk  Goal: Absence of Self-Harm  Outcome: Progressing  Intervention: Assess Risk to Self and Maintain Safety  Recent Flowsheet Documentation  Taken 4/20/2024 2021 by Kimberli Bryan RN  Enhanced Safety Measures:   pain management   room near unit station     Problem: Fall Injury Risk  Goal: Absence of Fall and Fall-Related Injury  Outcome: Progressing  Intervention: Identify and Manage Contributors  Recent Flowsheet Documentation  Taken 4/20/2024 2021 by Kimberli Bryan RN  Medication Review/Management: medications reviewed  Intervention: Promote Injury-Free Environment  Recent Flowsheet Documentation  Taken 4/20/2024 2021 by Kimberli Bryan RN  Safety Promotion/Fall Prevention:   assistive device/personal items within reach   lighting adjusted   clutter free environment maintained   nonskid shoes/slippers when out of bed   room near nurse's station   safety round/check completed     Problem: Alcohol Withdrawal  Goal: Alcohol Withdrawal Symptom Control  Outcome: Progressing  Goal: Optimal Neurologic Function  Outcome: Progressing  Goal: Readiness for Change Identified  Outcome: Progressing

## 2024-04-21 NOTE — PLAN OF CARE
"[id number]    Plan of Care Reviewed With: patient    Overall Patient Progress: no change    Outcome Evaluation: Pt is A & O x4 on 2 L of O2 via NC. Pt c/o 9/10 LUE pain - administered PRN pain medications. Denies nausea, chest pain & SOB. Pt has R PIV - SL. Pt refused full skin assessment, known R shoulder previous surgical site with skin graft open to air with sling in place. Pt is independent, voids spontaneously & uses call light appropriately. Droplet precautions maintained throughout shift.     Shift Updates  CIWA score at 0752, 1206 & 1617 was 3 - per orders, no PRN medication administered.  Pt went down for chest XR during shift.  1521 - Pt has been c/o increasing L arm pain especially around his surgical site throughout the past few days which seems to be getting worse. The patient is constantly requesting pain medications. MD notified and aware.   1609 - BP 89/57. Pt denying any s/s. Soft BP's noted throughout shift. MD notified. 500 ml bolus of LR ordered & began infusing at 1853.    Vitals: /65 (BP Location: Right arm, Patient Position: Semi-Gonzalez's, Cuff Size: Adult Regular)   Pulse 87   Temp 99.7  F (37.6  C) (Oral)   Resp 16   Ht 1.803 m (5' 11\")   Wt 84.1 kg (185 lb 6.4 oz)   SpO2 98%   BMI 25.86 kg/m      Plan: TBD. Continue w/ plan of care.       "

## 2024-04-22 LAB
BACTERIA BLD CULT: NO GROWTH
BACTERIA BLD CULT: NO GROWTH

## 2024-04-22 PROCEDURE — 120N000002 HC R&B MED SURG/OB UMMC

## 2024-04-22 PROCEDURE — 250N000013 HC RX MED GY IP 250 OP 250 PS 637: Performed by: INTERNAL MEDICINE

## 2024-04-22 PROCEDURE — 250N000011 HC RX IP 250 OP 636: Performed by: INTERNAL MEDICINE

## 2024-04-22 PROCEDURE — 99232 SBSQ HOSP IP/OBS MODERATE 35: CPT | Performed by: INTERNAL MEDICINE

## 2024-04-22 PROCEDURE — 250N000013 HC RX MED GY IP 250 OP 250 PS 637: Performed by: STUDENT IN AN ORGANIZED HEALTH CARE EDUCATION/TRAINING PROGRAM

## 2024-04-22 RX ADMIN — CLONIDINE HYDROCHLORIDE 0.1 MG: 0.1 TABLET ORAL at 17:14

## 2024-04-22 RX ADMIN — HYDROMORPHONE HYDROCHLORIDE 0.5 MG: 1 INJECTION, SOLUTION INTRAMUSCULAR; INTRAVENOUS; SUBCUTANEOUS at 08:40

## 2024-04-22 RX ADMIN — Medication 1 TABLET: at 08:38

## 2024-04-22 RX ADMIN — OSELTAMIVIR PHOSPHATE 75 MG: 75 CAPSULE ORAL at 08:38

## 2024-04-22 RX ADMIN — CLONIDINE HYDROCHLORIDE 0.1 MG: 0.1 TABLET ORAL at 08:39

## 2024-04-22 RX ADMIN — GABAPENTIN 600 MG: 300 CAPSULE ORAL at 08:38

## 2024-04-22 RX ADMIN — PANTOPRAZOLE SODIUM 40 MG: 40 TABLET, DELAYED RELEASE ORAL at 08:38

## 2024-04-22 RX ADMIN — OXYCODONE HYDROCHLORIDE 10 MG: 10 TABLET ORAL at 09:49

## 2024-04-22 RX ADMIN — GABAPENTIN 600 MG: 300 CAPSULE ORAL at 00:06

## 2024-04-22 RX ADMIN — OXYCODONE HYDROCHLORIDE 10 MG: 10 TABLET ORAL at 05:38

## 2024-04-22 RX ADMIN — ACETAMINOPHEN 650 MG: 325 TABLET, FILM COATED ORAL at 00:07

## 2024-04-22 RX ADMIN — OXYCODONE HYDROCHLORIDE 10 MG: 10 TABLET ORAL at 14:16

## 2024-04-22 RX ADMIN — HYDROMORPHONE HYDROCHLORIDE 0.5 MG: 1 INJECTION, SOLUTION INTRAMUSCULAR; INTRAVENOUS; SUBCUTANEOUS at 22:01

## 2024-04-22 RX ADMIN — FOLIC ACID 1 MG: 1 TABLET ORAL at 08:39

## 2024-04-22 RX ADMIN — CLONIDINE HYDROCHLORIDE 0.1 MG: 0.1 TABLET ORAL at 00:06

## 2024-04-22 RX ADMIN — GABAPENTIN 600 MG: 300 CAPSULE ORAL at 17:13

## 2024-04-22 RX ADMIN — OXYCODONE HYDROCHLORIDE 10 MG: 10 TABLET ORAL at 00:06

## 2024-04-22 RX ADMIN — HYDROMORPHONE HYDROCHLORIDE 0.5 MG: 1 INJECTION, SOLUTION INTRAMUSCULAR; INTRAVENOUS; SUBCUTANEOUS at 17:14

## 2024-04-22 RX ADMIN — OXYCODONE HYDROCHLORIDE 10 MG: 10 TABLET ORAL at 20:35

## 2024-04-22 RX ADMIN — ESCITALOPRAM OXALATE 10 MG: 10 TABLET ORAL at 08:39

## 2024-04-22 RX ADMIN — OSELTAMIVIR PHOSPHATE 75 MG: 75 CAPSULE ORAL at 20:35

## 2024-04-22 ASSESSMENT — ACTIVITIES OF DAILY LIVING (ADL)
ADLS_ACUITY_SCORE: 32

## 2024-04-22 NOTE — PROGRESS NOTES
Additional  ml bolus ordered since pt's SBP still 91.        Judith Landis MD.   Hospitalist.  [phone], pager.
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
## 2024-04-22 NOTE — PLAN OF CARE
"Goal Outcome Evaluation:      Plan of Care Reviewed With: patient    Overall Patient Progress: no change    Outcome Evaluation: pt keeps asking for pain med. on two occassion pt was snoring when med was taken to him. Pt BP improved from previous shift.      Problem: Adult Inpatient Plan of Care  Goal: Plan of Care Review  Description: The Plan of Care Review/Shift note should be completed every shift.  The Outcome Evaluation is a brief statement about your assessment that the patient is improving, declining, or no change.  This information will be displayed automatically on your shift  note.  Outcome: Progressing  Flowsheets (Taken 4/22/2024 0618)  Outcome Evaluation: pt keeps asking for pain med. on two occassion pt was snoring when med was taken to him.  Plan of Care Reviewed With: patient  Overall Patient Progress: no change  Goal: Patient-Specific Goal (Individualized)  Description: You can add care plan individualizations to a care plan. Examples of Individualization might be:  \"Parent requests to be called daily at 9am for status\", \"I have a hard time hearing out of my right ear\", or \"Do not touch me to wake me up as it startles  me\".  Outcome: Progressing  Goal: Absence of Hospital-Acquired Illness or Injury  Outcome: Progressing  Intervention: Identify and Manage Fall Risk  Recent Flowsheet Documentation  Taken 4/21/2024 1946 by Kimberli Bryan RN  Safety Promotion/Fall Prevention:   assistive device/personal items within reach   lighting adjusted   clutter free environment maintained   nonskid shoes/slippers when out of bed   room near nurse's station   safety round/check completed   room organization consistent   patient and family education   increased rounding and observation  Intervention: Prevent Skin Injury  Recent Flowsheet Documentation  Taken 4/21/2024 1946 by Kimberli Bryan RN  Body Position: position changed independently  Goal: Optimal Comfort and Wellbeing  Outcome: Progressing  Goal: " Readiness for Transition of Care  Outcome: Progressing     Problem: Suicide Risk  Goal: Absence of Self-Harm  Outcome: Progressing  Intervention: Assess Risk to Self and Maintain Safety  Recent Flowsheet Documentation  Taken 4/21/2024 1946 by Kimberli Bryan RN  Enhanced Safety Measures:   pain management   room near unit station     Problem: Fall Injury Risk  Goal: Absence of Fall and Fall-Related Injury  Outcome: Progressing  Intervention: Identify and Manage Contributors  Recent Flowsheet Documentation  Taken 4/21/2024 1946 by Kimberli Bryan RN  Medication Review/Management: medications reviewed  Intervention: Promote Injury-Free Environment  Recent Flowsheet Documentation  Taken 4/21/2024 1946 by Kimberli Bryan RN  Safety Promotion/Fall Prevention:   assistive device/personal items within reach   lighting adjusted   clutter free environment maintained   nonskid shoes/slippers when out of bed   room near nurse's station   safety round/check completed   room organization consistent   patient and family education   increased rounding and observation     Problem: Alcohol Withdrawal  Goal: Alcohol Withdrawal Symptom Control  Outcome: Progressing  Goal: Optimal Neurologic Function  Outcome: Progressing  Goal: Readiness for Change Identified  Outcome: Progressing

## 2024-04-22 NOTE — PLAN OF CARE
Goal Outcome Evaluation:  Plan of Care Reviewed With: patient  Overall Patient Progress: no change    Outcome Evaluation: Pt A&O x4 - Continuously asking for pain medication but would be sleeping in room.    RN Shift: 0700 - 1930    Vital Signs: Temp: 97.9  F (36.6  C) Temp src: Oral BP: 93/55 Pulse: 67   Resp: 16 SpO2: 95 % O2 Device: Nasal cannula Oxygen Delivery: 1 LPM   >>> Provider decreased from 2 L. to 1 L. At the beginning of the shift - Pt tolerating it well     CMS/Neuro: A&O x4   Baseline post surgery - N/T to L arm/hand  CIWA Score - 0     Cardio/Resp: No SOB or chest pain     Output: Continent of B/B     Activity: Independent     Skin: Visible skin intact - No rashes, abrasions, or lesions  >>> L shoulder surgical incision with skin flap - CLOVER  Refused full skin assessment     Pain: 10/10 - Scheduled and PRN medication given.   >>> Would state pain is 10/10 - Rn would go into pt room soon after and pt would be sleeping comfortably.      LDA: R forearm PIV - Patent, Currently SL     Diet: Regular, Thin liquids, Good appetite, Medication whole     Additional Info: Call light w/in reach and able to make needs known  L. Arm in a sling - On at all times     Plan: Continue POC - Discharge TBD       Nayeli Ye, RN, BSN, PHN
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
## 2024-04-23 PROCEDURE — 250N000013 HC RX MED GY IP 250 OP 250 PS 637: Performed by: INTERNAL MEDICINE

## 2024-04-23 PROCEDURE — 99233 SBSQ HOSP IP/OBS HIGH 50: CPT | Performed by: STUDENT IN AN ORGANIZED HEALTH CARE EDUCATION/TRAINING PROGRAM

## 2024-04-23 PROCEDURE — 250N000013 HC RX MED GY IP 250 OP 250 PS 637: Performed by: STUDENT IN AN ORGANIZED HEALTH CARE EDUCATION/TRAINING PROGRAM

## 2024-04-23 PROCEDURE — 250N000011 HC RX IP 250 OP 636: Performed by: INTERNAL MEDICINE

## 2024-04-23 PROCEDURE — 120N000002 HC R&B MED SURG/OB UMMC

## 2024-04-23 RX ORDER — LOPERAMIDE HCL 2 MG
2 CAPSULE ORAL 4 TIMES DAILY PRN
Status: DISCONTINUED | OUTPATIENT
Start: 2024-04-23 | End: 2024-04-24 | Stop reason: HOSPADM

## 2024-04-23 RX ORDER — ONDANSETRON 4 MG/1
4 TABLET, FILM COATED ORAL EVERY 6 HOURS PRN
Status: DISCONTINUED | OUTPATIENT
Start: 2024-04-23 | End: 2024-04-23

## 2024-04-23 RX ORDER — OLANZAPINE 10 MG/2ML
10 INJECTION, POWDER, FOR SOLUTION INTRAMUSCULAR 3 TIMES DAILY PRN
Status: DISCONTINUED | OUTPATIENT
Start: 2024-04-23 | End: 2024-04-24 | Stop reason: HOSPADM

## 2024-04-23 RX ORDER — MAGNESIUM HYDROXIDE/ALUMINUM HYDROXICE/SIMETHICONE 120; 1200; 1200 MG/30ML; MG/30ML; MG/30ML
30 SUSPENSION ORAL EVERY 4 HOURS PRN
Status: DISCONTINUED | OUTPATIENT
Start: 2024-04-23 | End: 2024-04-24 | Stop reason: HOSPADM

## 2024-04-23 RX ORDER — MULTIPLE VITAMINS W/ MINERALS TAB 9MG-400MCG
1 TAB ORAL DAILY
Status: DISCONTINUED | OUTPATIENT
Start: 2024-04-24 | End: 2024-04-24 | Stop reason: HOSPADM

## 2024-04-23 RX ORDER — LORAZEPAM 1 MG/1
1-4 TABLET ORAL EVERY 30 MIN PRN
Status: DISCONTINUED | OUTPATIENT
Start: 2024-04-23 | End: 2024-04-24 | Stop reason: HOSPADM

## 2024-04-23 RX ORDER — FOLIC ACID 1 MG/1
1 TABLET ORAL DAILY
Status: DISCONTINUED | OUTPATIENT
Start: 2024-04-24 | End: 2024-04-24 | Stop reason: HOSPADM

## 2024-04-23 RX ORDER — POLYETHYLENE GLYCOL 3350 17 G/17G
17 POWDER, FOR SOLUTION ORAL DAILY PRN
Status: DISCONTINUED | OUTPATIENT
Start: 2024-04-23 | End: 2024-04-24 | Stop reason: HOSPADM

## 2024-04-23 RX ORDER — ACETAMINOPHEN 325 MG/1
650 TABLET ORAL EVERY 4 HOURS PRN
Status: DISCONTINUED | OUTPATIENT
Start: 2024-04-23 | End: 2024-04-24 | Stop reason: HOSPADM

## 2024-04-23 RX ORDER — TRAZODONE HYDROCHLORIDE 50 MG/1
50 TABLET, FILM COATED ORAL
Status: DISCONTINUED | OUTPATIENT
Start: 2024-04-23 | End: 2024-04-24 | Stop reason: HOSPADM

## 2024-04-23 RX ORDER — OLANZAPINE 5 MG/1
10 TABLET ORAL 3 TIMES DAILY PRN
Status: DISCONTINUED | OUTPATIENT
Start: 2024-04-23 | End: 2024-04-24 | Stop reason: HOSPADM

## 2024-04-23 RX ORDER — HYDROXYZINE HYDROCHLORIDE 25 MG/1
25 TABLET, FILM COATED ORAL EVERY 4 HOURS PRN
Status: DISCONTINUED | OUTPATIENT
Start: 2024-04-23 | End: 2024-04-24 | Stop reason: HOSPADM

## 2024-04-23 RX ADMIN — HYDROMORPHONE HYDROCHLORIDE 0.5 MG: 1 INJECTION, SOLUTION INTRAMUSCULAR; INTRAVENOUS; SUBCUTANEOUS at 16:15

## 2024-04-23 RX ADMIN — ONDANSETRON 4 MG: 4 TABLET, ORALLY DISINTEGRATING ORAL at 04:28

## 2024-04-23 RX ADMIN — CLONIDINE HYDROCHLORIDE 0.1 MG: 0.1 TABLET ORAL at 16:15

## 2024-04-23 RX ADMIN — GABAPENTIN 300 MG: 300 CAPSULE ORAL at 16:14

## 2024-04-23 RX ADMIN — OXYCODONE HYDROCHLORIDE 10 MG: 10 TABLET ORAL at 04:28

## 2024-04-23 RX ADMIN — OSELTAMIVIR PHOSPHATE 75 MG: 75 CAPSULE ORAL at 19:50

## 2024-04-23 RX ADMIN — OXYCODONE HYDROCHLORIDE 10 MG: 10 TABLET ORAL at 00:10

## 2024-04-23 RX ADMIN — HYDROMORPHONE HYDROCHLORIDE 0.5 MG: 1 INJECTION, SOLUTION INTRAMUSCULAR; INTRAVENOUS; SUBCUTANEOUS at 01:43

## 2024-04-23 RX ADMIN — HYDROMORPHONE HYDROCHLORIDE 0.5 MG: 1 INJECTION, SOLUTION INTRAMUSCULAR; INTRAVENOUS; SUBCUTANEOUS at 07:12

## 2024-04-23 RX ADMIN — Medication 1 TABLET: at 08:21

## 2024-04-23 RX ADMIN — PANTOPRAZOLE SODIUM 40 MG: 40 TABLET, DELAYED RELEASE ORAL at 07:08

## 2024-04-23 RX ADMIN — ACETAMINOPHEN 650 MG: 325 TABLET, FILM COATED ORAL at 04:28

## 2024-04-23 RX ADMIN — FOLIC ACID 1 MG: 1 TABLET ORAL at 08:21

## 2024-04-23 RX ADMIN — OXYCODONE HYDROCHLORIDE 10 MG: 10 TABLET ORAL at 09:02

## 2024-04-23 RX ADMIN — GABAPENTIN 300 MG: 300 CAPSULE ORAL at 08:21

## 2024-04-23 RX ADMIN — GABAPENTIN 300 MG: 300 CAPSULE ORAL at 00:10

## 2024-04-23 RX ADMIN — OXYCODONE HYDROCHLORIDE 10 MG: 10 TABLET ORAL at 18:26

## 2024-04-23 RX ADMIN — OXYCODONE HYDROCHLORIDE 10 MG: 10 TABLET ORAL at 13:43

## 2024-04-23 RX ADMIN — OSELTAMIVIR PHOSPHATE 75 MG: 75 CAPSULE ORAL at 08:21

## 2024-04-23 RX ADMIN — HYDROMORPHONE HYDROCHLORIDE 0.5 MG: 1 INJECTION, SOLUTION INTRAMUSCULAR; INTRAVENOUS; SUBCUTANEOUS at 19:50

## 2024-04-23 RX ADMIN — HYDROMORPHONE HYDROCHLORIDE 0.5 MG: 1 INJECTION, SOLUTION INTRAMUSCULAR; INTRAVENOUS; SUBCUTANEOUS at 22:15

## 2024-04-23 RX ADMIN — ESCITALOPRAM OXALATE 10 MG: 10 TABLET ORAL at 08:21

## 2024-04-23 RX ADMIN — HYDROMORPHONE HYDROCHLORIDE 0.5 MG: 1 INJECTION, SOLUTION INTRAMUSCULAR; INTRAVENOUS; SUBCUTANEOUS at 11:42

## 2024-04-23 ASSESSMENT — ACTIVITIES OF DAILY LIVING (ADL)
ADLS_ACUITY_SCORE: 32

## 2024-04-23 NOTE — TELEPHONE ENCOUNTER
Pt is still taking narcotic pain meds for shoulder pain and L brachial plexus injury with recent repair surgery at [place] on 3/29/24.     Yesterdays notes   Currently on 1-2 L NC O2 supplement due to influenza B infection.     >>> Provider decreased from 2 L. to 1 L. At the beginning of the shift - Pt tolerating it well     Overnight   BP was low at 85/53 at 0012,   Called that BP low x 2 with systolics in the 80s. Reportedly asymptomatic but has not had great PO intake. Does have an IV in place.     Recheck was now 95/52.     Pt is not YET medically approved

## 2024-04-23 NOTE — PROGRESS NOTES
Care Management Discharge Note    Discharge Date: 04/24/24     Discharge Disposition: Inpatient Chemical Dependency     [facility]  [address] [place] [address]   [phone]    Discharge Services: Chemical Dependency Resources, County Worker, Mental Health Resources    Discharge DME: None    Discharge Transportation: [facility] at 1:00 pm    Private pay costs discussed: Not applicable    Does the patient's insurance plan have a 3 day qualifying hospital stay waiver?  No    PAS Confirmation Code: N/A     Patient/family educated on Medicare website which has current facility and service quality ratings: N/A    Education Provided on the Discharge Plan: Yes    Persons Notified of Discharge Plans: Charge Nurse, Bedside Nurse, MD, Patient, IP CD TX Staff    Patient/Family in Agreement with the Plan: Yes    Handoff Referral Completed: Yes    Additional Information:    Patient to discharge to [facility] Inpatient Chemical Dependency treatment on 4/24 via [facility] at 1:00 pm. Patient to be sent with 30 days of medications. No IMM or PAS is needed. No hand off to primary care physician completed due to patient does not have one on file and he declined to establish.    BABATUNDE Drew, MSW  6th Floor Medical Surgical Unit  Phone [phone]

## 2024-04-23 NOTE — PLAN OF CARE
Goal Outcome Evaluation:      Plan of Care Reviewed With: patient    Overall Patient Progress: improvingOverall Patient Progress: improving         A&O x 4. Denied chest pain,SOB.  BP was low at 85/53 at 0012, Provider notified. The rechecks were stable. Pt encouraged to drink more throughout the shift which helped most recent BP 97/62.   C/O nausea, managed with PRN Zofran. CIWA scores 0, pt mentioned he is out of withdrawal during the assessment.   Continent of bowel and bladder,reported LBM 4/20. C/o of stomach being tender upon palpitation, bowel sounds audible x4 quadrants. Refused skin assessment but exposed skin is intact other than the L shoulder incision site -open to air and abd healed surgery scar.  Pt had a good night and slept most of the night only woken up by the  nurse during rounds and pain assessments. Shoulder pain managed with PRN oxycodone and Dilaudid.Pain well managed this shift.  Independent in room. Able to make needs known. Call light within reach. Pt had a good night.     Denita Castano RN

## 2024-04-23 NOTE — PROGRESS NOTES
Cross Cover    Called that BP low x 2 with systolics in the 80s. Reportedly asymptomatic but has not had great PO intake. Does have an IV in place.    Recheck was now 95/52.     No intervention. If low again would give him an IV bolus. Encourage PO intake.    Alexandra Harris MD (Sally)  Internal Medicine/Pediatrics  Hospitalist
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
## 2024-04-23 NOTE — PLAN OF CARE
Plan of care reviewed: With patient     Overall Patient's Progress: Progressing except for pain    Alert and oriented x4, Independent on ADL's, Doubly continent.     Respi: Seen with O2 support during morning rounds. Titrated and removed, currently saturating well on RA. On continuous pulse ox. Breathing pattern and rhythm WDL,no SOB.     Cardio:  Normotensive, Held clonidine morning dose due to soft BP on morning VS, given the next due dose this afternoon. No chest pain.     Pain: Prn medications for pain given timely as per pt's pain complaint. Rated pain on his left arm as 8-9/10 described as intermittent sharp and throbbing pain. Maintained sling on left arm, no signs of decreased perfusion on peripheries.   Claimed his abdomen is tender to touch, not distended or hard upon assessment. Claimed he has been experiencing it for some time, and said that it's not new and  intermittent.     Diet: On regular diet    Skin: Intact, with left shoulder surgical incision     PIV: Left arm Gauge 24, patent    Droplet precautions observed.  Place call bell within reach.

## 2024-04-24 VITALS
OXYGEN SATURATION: 95 % | TEMPERATURE: 97.8 F | WEIGHT: 185.4 LBS | HEART RATE: 74 BPM | HEIGHT: 71 IN | BODY MASS INDEX: 25.96 KG/M2 | DIASTOLIC BLOOD PRESSURE: 59 MMHG | SYSTOLIC BLOOD PRESSURE: 93 MMHG | RESPIRATION RATE: 19 BRPM

## 2024-04-24 PROCEDURE — 250N000013 HC RX MED GY IP 250 OP 250 PS 637

## 2024-04-24 PROCEDURE — 250N000011 HC RX IP 250 OP 636: Performed by: INTERNAL MEDICINE

## 2024-04-24 PROCEDURE — 250N000013 HC RX MED GY IP 250 OP 250 PS 637: Performed by: STUDENT IN AN ORGANIZED HEALTH CARE EDUCATION/TRAINING PROGRAM

## 2024-04-24 PROCEDURE — 99239 HOSP IP/OBS DSCHRG MGMT >30: CPT | Performed by: STUDENT IN AN ORGANIZED HEALTH CARE EDUCATION/TRAINING PROGRAM

## 2024-04-24 PROCEDURE — 250N000013 HC RX MED GY IP 250 OP 250 PS 637: Performed by: INTERNAL MEDICINE

## 2024-04-24 RX ORDER — ASPIRIN 81 MG/1
81 TABLET, CHEWABLE ORAL DAILY
Qty: 30 TABLET | Refills: 0 | Status: SHIPPED | OUTPATIENT
Start: 2024-04-24

## 2024-04-24 RX ORDER — HYDROXYZINE HYDROCHLORIDE 25 MG/1
25 TABLET, FILM COATED ORAL EVERY 4 HOURS PRN
Qty: 30 TABLET | Refills: 0 | Status: SHIPPED | OUTPATIENT
Start: 2024-04-24

## 2024-04-24 RX ORDER — TRAZODONE HYDROCHLORIDE 50 MG/1
50 TABLET, FILM COATED ORAL
Qty: 30 TABLET | Refills: 0 | Status: SHIPPED | OUTPATIENT
Start: 2024-04-24 | End: 2024-04-24

## 2024-04-24 RX ORDER — FOLIC ACID 1 MG/1
1 TABLET ORAL DAILY
Qty: 30 TABLET | Refills: 0 | Status: SHIPPED | OUTPATIENT
Start: 2024-04-24

## 2024-04-24 RX ORDER — LANOLIN ALCOHOL/MO/W.PET/CERES
100 CREAM (GRAM) TOPICAL DAILY
Qty: 30 TABLET | Refills: 0 | Status: SHIPPED | OUTPATIENT
Start: 2024-04-24

## 2024-04-24 RX ORDER — GABAPENTIN 300 MG/1
300 CAPSULE ORAL EVERY 8 HOURS
Qty: 6 CAPSULE | Refills: 0 | Status: SHIPPED | OUTPATIENT
Start: 2024-04-24

## 2024-04-24 RX ORDER — GABAPENTIN 100 MG/1
100 CAPSULE ORAL EVERY 8 HOURS
Qty: 9 CAPSULE | Refills: 0 | Status: SHIPPED | OUTPATIENT
Start: 2024-04-25

## 2024-04-24 RX ORDER — CLONIDINE HYDROCHLORIDE 0.1 MG/1
0.1 TABLET ORAL EVERY 8 HOURS
Qty: 30 TABLET | Refills: 0 | Status: SHIPPED | OUTPATIENT
Start: 2024-04-24 | End: 2024-04-24

## 2024-04-24 RX ORDER — TRAZODONE HYDROCHLORIDE 50 MG/1
50 TABLET, FILM COATED ORAL
Qty: 30 TABLET | Refills: 0 | Status: SHIPPED | OUTPATIENT
Start: 2024-04-24

## 2024-04-24 RX ORDER — PANTOPRAZOLE SODIUM 40 MG/1
40 TABLET, DELAYED RELEASE ORAL DAILY
Qty: 30 TABLET | Refills: 0 | Status: SHIPPED | OUTPATIENT
Start: 2024-04-24

## 2024-04-24 RX ORDER — LOPERAMIDE HCL 2 MG
2 CAPSULE ORAL 4 TIMES DAILY PRN
Qty: 30 CAPSULE | Refills: 0 | Status: SHIPPED | OUTPATIENT
Start: 2024-04-24 | End: 2024-04-24

## 2024-04-24 RX ORDER — MULTIPLE VITAMINS W/ MINERALS TAB 9MG-400MCG
1 TAB ORAL DAILY
Qty: 30 TABLET | Refills: 0 | Status: SHIPPED | OUTPATIENT
Start: 2024-04-24

## 2024-04-24 RX ORDER — LOPERAMIDE HCL 2 MG
2 CAPSULE ORAL 4 TIMES DAILY PRN
Qty: 30 CAPSULE | Refills: 0 | Status: SHIPPED | OUTPATIENT
Start: 2024-04-24

## 2024-04-24 RX ORDER — CLONIDINE HYDROCHLORIDE 0.1 MG/1
0.1 TABLET ORAL EVERY 8 HOURS
Qty: 30 TABLET | Refills: 2 | Status: SHIPPED | OUTPATIENT
Start: 2024-04-24

## 2024-04-24 RX ORDER — OXYCODONE HYDROCHLORIDE 10 MG/1
10 TABLET ORAL EVERY 4 HOURS PRN
Qty: 20 TABLET | Refills: 0 | Status: SHIPPED | OUTPATIENT
Start: 2024-04-24

## 2024-04-24 RX ORDER — ESCITALOPRAM OXALATE 10 MG/1
10 TABLET ORAL DAILY
Qty: 30 TABLET | Refills: 0 | Status: SHIPPED | OUTPATIENT
Start: 2024-04-24

## 2024-04-24 RX ADMIN — OXYCODONE HYDROCHLORIDE 10 MG: 10 TABLET ORAL at 06:31

## 2024-04-24 RX ADMIN — HYDROMORPHONE HYDROCHLORIDE 0.5 MG: 1 INJECTION, SOLUTION INTRAMUSCULAR; INTRAVENOUS; SUBCUTANEOUS at 01:13

## 2024-04-24 RX ADMIN — THIAMINE HCL TAB 100 MG 100 MG: 100 TAB at 08:04

## 2024-04-24 RX ADMIN — PANTOPRAZOLE SODIUM 40 MG: 40 TABLET, DELAYED RELEASE ORAL at 08:04

## 2024-04-24 RX ADMIN — FOLIC ACID 1 MG: 1 TABLET ORAL at 08:04

## 2024-04-24 RX ADMIN — ESCITALOPRAM OXALATE 10 MG: 10 TABLET ORAL at 08:04

## 2024-04-24 RX ADMIN — GABAPENTIN 300 MG: 300 CAPSULE ORAL at 08:59

## 2024-04-24 RX ADMIN — ACETAMINOPHEN 650 MG: 325 TABLET, FILM COATED ORAL at 06:31

## 2024-04-24 RX ADMIN — HYDROMORPHONE HYDROCHLORIDE 0.5 MG: 1 INJECTION, SOLUTION INTRAMUSCULAR; INTRAVENOUS; SUBCUTANEOUS at 08:59

## 2024-04-24 RX ADMIN — HYDROMORPHONE HYDROCHLORIDE 0.5 MG: 1 INJECTION, SOLUTION INTRAMUSCULAR; INTRAVENOUS; SUBCUTANEOUS at 05:27

## 2024-04-24 RX ADMIN — Medication 1 TABLET: at 08:04

## 2024-04-24 RX ADMIN — OXYCODONE HYDROCHLORIDE 10 MG: 10 TABLET ORAL at 10:26

## 2024-04-24 RX ADMIN — ACETAMINOPHEN 650 MG: 325 TABLET, FILM COATED ORAL at 02:27

## 2024-04-24 RX ADMIN — GABAPENTIN 300 MG: 300 CAPSULE ORAL at 01:13

## 2024-04-24 RX ADMIN — OXYCODONE HYDROCHLORIDE 10 MG: 10 TABLET ORAL at 02:27

## 2024-04-24 RX ADMIN — HYDROMORPHONE HYDROCHLORIDE 0.5 MG: 1 INJECTION, SOLUTION INTRAMUSCULAR; INTRAVENOUS; SUBCUTANEOUS at 03:15

## 2024-04-24 RX ADMIN — HYDROXYZINE HYDROCHLORIDE 25 MG: 25 TABLET, FILM COATED ORAL at 04:37

## 2024-04-24 RX ADMIN — OSELTAMIVIR PHOSPHATE 75 MG: 75 CAPSULE ORAL at 08:04

## 2024-04-24 ASSESSMENT — ACTIVITIES OF DAILY LIVING (ADL)
ADLS_ACUITY_SCORE: 32

## 2024-04-24 NOTE — PLAN OF CARE
"Goal Outcome Evaluation:    Shift [id number]    Please see Flowsheets and MAR for pt full assessment    VS: BP 95/57 (BP Location: Right arm)   Pulse 63   Temp 97.6  F (36.4  C) (Oral)   Resp 16   Ht 1.803 m (5' 11\")   Wt 84.1 kg (185 lb 6.4 oz)   SpO2 94%   BMI 25.86 kg/m    Afebrile   O2: SpO2 > 94% and stable on RA. LS slightly diminished clear and equal bilaterally. Denies chest pain and SOB. No s/s of respiratory distress   Output: Voids spontaneously without difficulty to bathroom    Last BM: 4/20/24 denies abdominal discomfort. BS active X4 passing flatus. ABD soft round no distension   Activity: Up with SBA    Skin: WDL except, surgical scar L shoulder RED Ortho brace in place   Pain: Pain managed with IV dilaudid and PO oxycodone and acetaminophen    CMS: Intact, AOx4. Denies numbness and tingling.   Dressing: NA   Diet: Regular diet. Denies nausea/vomiting.    LDA: R PIV is CDI and SL    Equipment: IV pole, personal belongings, remain with pt   Plan: TBD, Droplet precautions maintained. No acute changes. Continue with plan of care. Call light within reach, pt able to make needs known.    Additional Info:          Plan of Care Reviewed With: patient    Overall Patient Progress: improving    Mike Lai RN          "
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
## 2024-04-24 NOTE — PLAN OF CARE
Goal Outcome Evaluation:      Plan of Care Reviewed With: patient    Overall Patient Progress: improvingOverall Patient Progress: improving     6MS DISCHARGE    D: Patient discharged to Chemical Dependency Facility at 1335. Patient accompanied by transport.    I: Discharge prescriptions given to patient. All discharge medications and instructions reviewed with patient. Patient instructed to seek care if experiencing worsening symptoms. Other phone numbers to call with questions or concerns after discharge reviewed. PIV removed. Education completed.    A: Patient verbalized understanding of discharge medications and instructions. Belongings remain with patient.    P: Patient to follow-up on care with Primary team.

## 2024-04-26 NOTE — TELEPHONE ENCOUNTER
Per pt's joi patient was discharged from hospital to inpatient chemical dependence at [facility] on 4/24/24.